# Patient Record
Sex: MALE | Race: WHITE | NOT HISPANIC OR LATINO | Employment: OTHER | URBAN - METROPOLITAN AREA
[De-identification: names, ages, dates, MRNs, and addresses within clinical notes are randomized per-mention and may not be internally consistent; named-entity substitution may affect disease eponyms.]

---

## 2017-08-31 ENCOUNTER — HISTORICAL (OUTPATIENT)
Dept: ADMINISTRATIVE | Facility: HOSPITAL | Age: 72
End: 2017-08-31

## 2017-08-31 LAB
ALBUMIN SERPL-MCNC: 4.5 GM/DL (ref 3.4–5)
ALBUMIN/GLOB SERPL: 1 RATIO (ref 1–2)
ALP SERPL-CCNC: 72 UNIT/L (ref 45–117)
ALT SERPL-CCNC: 22 UNIT/L (ref 12–78)
AST SERPL-CCNC: 16 UNIT/L (ref 15–37)
BILIRUB SERPL-MCNC: 0.5 MG/DL (ref 0.2–1)
BILIRUBIN DIRECT+TOT PNL SERPL-MCNC: 0.1 MG/DL
BILIRUBIN DIRECT+TOT PNL SERPL-MCNC: 0.4 MG/DL
BUN SERPL-MCNC: 22 MG/DL (ref 7–18)
CALCIUM SERPL-MCNC: 9.4 MG/DL (ref 8.5–10.1)
CHLORIDE SERPL-SCNC: 102 MMOL/L (ref 98–107)
CO2 SERPL-SCNC: 27 MMOL/L (ref 21–32)
CREAT SERPL-MCNC: 1.1 MG/DL (ref 0.6–1.3)
CREAT UR-MCNC: 190 MG/DL
GLOBULIN SER-MCNC: 3.4 GM/ML (ref 2.3–3.5)
GLUCOSE SERPL-MCNC: 146 MG/DL (ref 74–106)
MICROALBUMIN UR-MCNC: 15.6 MG/L (ref 0–19)
MICROALBUMIN/CREAT RATIO PNL UR: 8.2 MCG/MG CR (ref 0–29)
POTASSIUM SERPL-SCNC: 4.5 MMOL/L (ref 3.5–5.1)
PROT SERPL-MCNC: 7.9 GM/DL (ref 6.4–8.2)
SODIUM SERPL-SCNC: 138 MMOL/L (ref 136–145)

## 2018-05-01 ENCOUNTER — HISTORICAL (OUTPATIENT)
Dept: LAB | Facility: HOSPITAL | Age: 73
End: 2018-05-01

## 2018-05-01 LAB
EST. AVERAGE GLUCOSE BLD GHB EST-MCNC: 157 MG/DL
HBA1C MFR BLD: 7.1 % (ref 4.2–6.3)

## 2019-08-06 ENCOUNTER — HISTORICAL (OUTPATIENT)
Dept: LAB | Facility: HOSPITAL | Age: 74
End: 2019-08-06

## 2019-08-06 LAB
APPEARANCE, UA: CLEAR
BACTERIA SPEC CULT: ABNORMAL /HPF
BILIRUB UR QL STRIP: NEGATIVE
COLOR UR: YELLOW
CREAT UR-MCNC: 76 MG/DL
GLUCOSE (UA): ABNORMAL
HGB UR QL STRIP: NEGATIVE
KETONES UR QL STRIP: NEGATIVE
LEUKOCYTE ESTERASE UR QL STRIP: NEGATIVE
MICROALBUMIN UR-MCNC: 1.2 MG/DL
MICROALBUMIN/CREAT RATIO PNL UR: 15.8 MG/GM CR (ref 0–30)
NITRITE UR QL STRIP: NEGATIVE
PH UR STRIP: 6 [PH] (ref 5–9)
PROT UR QL STRIP: NEGATIVE
RBC #/AREA URNS HPF: ABNORMAL /[HPF]
SP GR UR STRIP: 1.02 (ref 1–1.03)
SQUAMOUS EPITHELIAL, UA: ABNORMAL
UROBILINOGEN UR STRIP-ACNC: 1
WBC #/AREA URNS HPF: ABNORMAL /HPF

## 2020-01-30 ENCOUNTER — HISTORICAL (OUTPATIENT)
Dept: ADMINISTRATIVE | Facility: HOSPITAL | Age: 75
End: 2020-01-30

## 2020-01-30 LAB
ABS NEUT (OLG): 0.42 X10(3)/MCL (ref 2.1–9.2)
ALBUMIN SERPL-MCNC: 4.1 GM/DL (ref 3.4–5)
ALBUMIN/GLOB SERPL: 1.2 RATIO (ref 1.1–2)
ALP SERPL-CCNC: 68 UNIT/L (ref 50–136)
ALT SERPL-CCNC: 16 UNIT/L (ref 12–78)
ANISOCYTOSIS BLD QL SMEAR: 1
APPEARANCE, UA: CLEAR
AST SERPL-CCNC: 8 UNIT/L (ref 15–37)
BACTERIA SPEC CULT: ABNORMAL /HPF
BILIRUB SERPL-MCNC: 0.5 MG/DL (ref 0.2–1)
BILIRUB UR QL STRIP: NEGATIVE
BILIRUBIN DIRECT+TOT PNL SERPL-MCNC: 0.2 MG/DL (ref 0–0.5)
BILIRUBIN DIRECT+TOT PNL SERPL-MCNC: 0.3 MG/DL (ref 0–0.8)
BUN SERPL-MCNC: 22 MG/DL (ref 7–18)
CALCIUM SERPL-MCNC: 9 MG/DL (ref 8.5–10.1)
CHLORIDE SERPL-SCNC: 104 MMOL/L (ref 98–107)
CHOLEST SERPL-MCNC: 154 MG/DL (ref 0–200)
CHOLEST/HDLC SERPL: 4.3 {RATIO} (ref 0–5)
CO2 SERPL-SCNC: 28 MMOL/L (ref 21–32)
COLOR UR: YELLOW
CREAT SERPL-MCNC: 1.11 MG/DL (ref 0.7–1.3)
CREAT UR-MCNC: 95 MG/DL
ERYTHROCYTE [DISTWIDTH] IN BLOOD BY AUTOMATED COUNT: 15 % (ref 11.5–17)
EST. AVERAGE GLUCOSE BLD GHB EST-MCNC: 143 MG/DL
GLOBULIN SER-MCNC: 3.4 GM/DL (ref 2.4–3.5)
GLUCOSE (UA): ABNORMAL
GLUCOSE SERPL-MCNC: 152 MG/DL (ref 74–106)
HBA1C MFR BLD: 6.6 % (ref 4.2–6.3)
HCT VFR BLD AUTO: 38.1 % (ref 42–52)
HDLC SERPL-MCNC: 36 MG/DL (ref 35–60)
HGB BLD-MCNC: 12.1 GM/DL (ref 14–18)
HGB UR QL STRIP: NEGATIVE
KETONES UR QL STRIP: NEGATIVE
LDLC SERPL CALC-MCNC: 74 MG/DL (ref 0–129)
LEUKOCYTE ESTERASE UR QL STRIP: NEGATIVE
LYMPHOCYTES NFR BLD MANUAL: 70 % (ref 13–40)
MACROCYTES BLD QL SMEAR: 1 /MCL
MCH RBC QN AUTO: 33.4 PG (ref 27–31)
MCHC RBC AUTO-ENTMCNC: 31.8 GM/DL (ref 33–36)
MCV RBC AUTO: 105.2 FL (ref 80–94)
METAMYELOCYTES NFR BLD MANUAL: 1 %
MICROALBUMIN UR-MCNC: 1.5 MG/DL
MICROALBUMIN/CREAT RATIO PNL UR: 15.8 MG/GM CR (ref 0–30)
MONOCYTES NFR BLD MANUAL: 10 % (ref 2–11)
NEUTROPHILS NFR BLD MANUAL: 19 % (ref 47–80)
NITRITE UR QL STRIP: NEGATIVE
PH UR STRIP: 5.5 [PH] (ref 5–9)
PLATELET # BLD AUTO: 139 X10(3)/MCL (ref 130–400)
PLATELET # BLD EST: NORMAL 10*3/UL
PMV BLD AUTO: 9.8 FL (ref 7.4–10.4)
POTASSIUM SERPL-SCNC: 4.2 MMOL/L (ref 3.5–5.1)
PROT SERPL-MCNC: 7.5 GM/DL (ref 6.4–8.2)
PROT UR QL STRIP: NEGATIVE
RBC # BLD AUTO: 3.62 X10(6)/MCL (ref 4.7–6.1)
RBC #/AREA URNS HPF: ABNORMAL /[HPF]
RBC MORPH BLD: ABNORMAL
SODIUM SERPL-SCNC: 139 MMOL/L (ref 136–145)
SP GR UR STRIP: 1.03 (ref 1–1.03)
SQUAMOUS EPITHELIAL, UA: ABNORMAL
TRIGL SERPL-MCNC: 218 MG/DL (ref 30–150)
UROBILINOGEN UR STRIP-ACNC: 1
VLDLC SERPL CALC-MCNC: 44 MG/DL
WBC # SPEC AUTO: 2.2 X10(3)/MCL (ref 4.5–11.5)
WBC #/AREA URNS HPF: ABNORMAL /HPF

## 2020-07-08 ENCOUNTER — HISTORICAL (OUTPATIENT)
Dept: ADMINISTRATIVE | Facility: HOSPITAL | Age: 75
End: 2020-07-08

## 2020-07-08 LAB
ABS NEUT (OLG): 0.57 X10(3)/MCL (ref 2.1–9.2)
ERYTHROCYTE [DISTWIDTH] IN BLOOD BY AUTOMATED COUNT: 15.2 % (ref 11.5–17)
HCT VFR BLD AUTO: 38.8 % (ref 42–52)
HGB BLD-MCNC: 12.5 GM/DL (ref 14–18)
LYMPHOCYTES # BLD AUTO: 1.3 X10(3)/MCL (ref 0.6–4.6)
LYMPHOCYTES NFR BLD AUTO: 57 %
MCH RBC QN AUTO: 34 PG (ref 27–31)
MCHC RBC AUTO-ENTMCNC: 32.2 GM/DL (ref 33–36)
MCV RBC AUTO: 105.4 FL (ref 80–94)
MONOCYTES # BLD AUTO: 0.3 X10(3)/MCL (ref 0.1–1.3)
MONOCYTES NFR BLD AUTO: 14 %
NEUTROPHILS # BLD AUTO: 0.57 X10(3)/MCL (ref 2.1–9.2)
NEUTROPHILS NFR BLD AUTO: 26 %
PLATELET # BLD AUTO: 126 X10(3)/MCL (ref 130–400)
PMV BLD AUTO: 10.1 FL (ref 9.4–12.4)
RBC # BLD AUTO: 3.68 X10(6)/MCL (ref 4.7–6.1)
WBC # SPEC AUTO: 2.2 X10(3)/MCL (ref 4.5–11.5)

## 2020-08-19 ENCOUNTER — HISTORICAL (OUTPATIENT)
Dept: ADMINISTRATIVE | Facility: HOSPITAL | Age: 75
End: 2020-08-19

## 2020-08-19 LAB
ABS NEUT (OLG): 0.62 X10(3)/MCL (ref 2.1–9.2)
ERYTHROCYTE [DISTWIDTH] IN BLOOD BY AUTOMATED COUNT: 15.7 % (ref 11.5–14.5)
FERRITIN SERPL-MCNC: 22.9 NG/ML (ref 26–388)
FOLATE SERPL-MCNC: 10.5 NG/ML (ref 3.1–17.5)
HAPTOGLOB SERPL-MCNC: 173 MG/DL (ref 40–368)
HAV IGM SERPL QL IA: NONREACTIVE
HBV CORE IGM SERPL QL IA: NONREACTIVE
HBV SURFACE AG SERPL QL IA: NONREACTIVE
HCT VFR BLD AUTO: 38.4 % (ref 40–51)
HCV AB SERPL QL IA: NONREACTIVE
HGB BLD-MCNC: 12 GM/DL (ref 13.5–17.5)
HIV 1+2 AB+HIV1 P24 AG SERPL QL IA: NONREACTIVE
IMM GRANULOCYTES # BLD AUTO: 0.03 10*3/UL
IMM GRANULOCYTES NFR BLD AUTO: 1 %
IRON SATN MFR SERPL: 25.3 % (ref 15–50)
IRON SERPL-MCNC: 97 MCG/DL (ref 65–175)
LDH SERPL-CCNC: 187 UNIT/L (ref 87–241)
LYMPHOCYTES # BLD AUTO: 1.4 X10(3)/MCL (ref 0.6–4.6)
LYMPHOCYTES NFR BLD AUTO: 58 %
MCH RBC QN AUTO: 34 PG (ref 26–34)
MCHC RBC AUTO-ENTMCNC: 31.3 GM/DL (ref 31–37)
MCV RBC AUTO: 108.8 FL (ref 80–100)
MONOCYTES # BLD AUTO: 0.4 X10(3)/MCL (ref 0.1–1.3)
MONOCYTES NFR BLD AUTO: 14 %
NEUTROPHILS # BLD AUTO: 0.62 X10(3)/MCL (ref 2.1–9.2)
NEUTROPHILS NFR BLD AUTO: 26 %
PLATELET # BLD AUTO: 117 X10(3)/MCL (ref 130–400)
PMV BLD AUTO: 10.6 FL (ref 7.4–10.4)
RBC # BLD AUTO: 3.53 X10(6)/MCL (ref 4.5–5.9)
RET# (OHS): 0.08 X10(6)/MCL (ref 0.02–0.09)
RETICULOCYTE COUNT AUTOMATED (OLG): 2.1 % (ref 0.5–1.5)
TIBC SERPL-MCNC: 384 MCG/DL (ref 250–450)
TRANSFERRIN SERPL-MCNC: 297 MG/DL (ref 200–360)
VIT B12 SERPL-MCNC: 190 PG/ML (ref 193–986)
WBC # SPEC AUTO: 2.4 X10(3)/MCL (ref 4.5–11)

## 2020-09-08 ENCOUNTER — HISTORICAL (OUTPATIENT)
Dept: ENDOSCOPY | Facility: HOSPITAL | Age: 75
End: 2020-09-08

## 2020-09-08 LAB
ABS NEUT (OLG): 0.76 X10(3)/MCL (ref 2.1–9.2)
BASOPHILS # BLD AUTO: 0 X10(3)/MCL (ref 0–0.2)
BASOPHILS NFR BLD AUTO: 0 %
ERYTHROCYTE [DISTWIDTH] IN BLOOD BY AUTOMATED COUNT: 15 % (ref 11.5–14.5)
HCT VFR BLD AUTO: 38.7 % (ref 40–51)
HGB BLD-MCNC: 12.4 GM/DL (ref 13.5–17.5)
IMM GRANULOCYTES # BLD AUTO: 0.12 10*3/UL
IMM GRANULOCYTES NFR BLD AUTO: 5 %
INR PPP: 1.02 (ref 0.9–1.2)
LYMPHOCYTES # BLD AUTO: 1.3 X10(3)/MCL (ref 0.6–4.6)
LYMPHOCYTES NFR BLD AUTO: 54 %
MCH RBC QN AUTO: 33.3 PG (ref 26–34)
MCHC RBC AUTO-ENTMCNC: 32 GM/DL (ref 31–37)
MCV RBC AUTO: 104 FL (ref 80–100)
MONOCYTES # BLD AUTO: 0.2 X10(3)/MCL (ref 0.1–1.3)
MONOCYTES NFR BLD AUTO: 9 %
NEUTROPHILS # BLD AUTO: 0.76 X10(3)/MCL (ref 2.1–9.2)
NEUTROPHILS NFR BLD AUTO: 31 %
PLATELET # BLD AUTO: 131 X10(3)/MCL (ref 130–400)
PMV BLD AUTO: 9.6 FL (ref 7.4–10.4)
PROTHROMBIN TIME: 13 SECOND(S) (ref 11.9–14.4)
RBC # BLD AUTO: 3.72 X10(6)/MCL (ref 4.5–5.9)
WBC # SPEC AUTO: 2.4 X10(3)/MCL (ref 4.5–11)

## 2020-11-12 ENCOUNTER — HISTORICAL (OUTPATIENT)
Dept: HEMATOLOGY/ONCOLOGY | Facility: CLINIC | Age: 75
End: 2020-11-12

## 2020-11-12 LAB
ABS NEUT (OLG): 1.61 X10(3)/MCL (ref 2.1–9.2)
ALBUMIN SERPL-MCNC: 4.3 GM/DL (ref 3.4–4.8)
ALBUMIN/GLOB SERPL: 1.3 RATIO (ref 1.1–2)
ALP SERPL-CCNC: 87 UNIT/L (ref 40–150)
ALT SERPL-CCNC: 10 UNIT/L (ref 0–55)
AST SERPL-CCNC: 13 UNIT/L (ref 5–34)
BASOPHILS # BLD AUTO: 0 X10(3)/MCL (ref 0–0.2)
BASOPHILS NFR BLD AUTO: 0 %
BILIRUB SERPL-MCNC: 0.7 MG/DL
BILIRUBIN DIRECT+TOT PNL SERPL-MCNC: 0.3 MG/DL (ref 0–0.5)
BILIRUBIN DIRECT+TOT PNL SERPL-MCNC: 0.4 MG/DL (ref 0–0.8)
BUN SERPL-MCNC: 18 MG/DL (ref 8.4–25.7)
CALCIUM SERPL-MCNC: 9.4 MG/DL (ref 8.8–10)
CHLORIDE SERPL-SCNC: 101 MMOL/L (ref 98–107)
CO2 SERPL-SCNC: 26 MMOL/L (ref 23–31)
CREAT SERPL-MCNC: 1.12 MG/DL (ref 0.73–1.18)
ERYTHROCYTE [DISTWIDTH] IN BLOOD BY AUTOMATED COUNT: 15.6 % (ref 11.5–14.5)
FERRITIN SERPL-MCNC: 60.94 NG/ML (ref 21.81–274.66)
GLOBULIN SER-MCNC: 3.4 GM/DL (ref 2.4–3.5)
GLUCOSE SERPL-MCNC: 132 MG/DL (ref 82–115)
HCT VFR BLD AUTO: 37.6 % (ref 40–51)
HGB BLD-MCNC: 12.4 GM/DL (ref 13.5–17.5)
IMM GRANULOCYTES # BLD AUTO: 0.14 10*3/UL
IMM GRANULOCYTES NFR BLD AUTO: 4 %
IRON SATN MFR SERPL: 20 % (ref 20–50)
IRON SERPL-MCNC: 68 UG/DL (ref 65–175)
LYMPHOCYTES # BLD AUTO: 1.2 X10(3)/MCL (ref 0.6–4.6)
LYMPHOCYTES NFR BLD AUTO: 36 %
MCH RBC QN AUTO: 34.3 PG (ref 26–34)
MCHC RBC AUTO-ENTMCNC: 33 GM/DL (ref 31–37)
MCV RBC AUTO: 103.9 FL (ref 80–100)
MONOCYTES # BLD AUTO: 0.4 X10(3)/MCL (ref 0.1–1.3)
MONOCYTES NFR BLD AUTO: 12 %
NEUTROPHILS # BLD AUTO: 1.61 X10(3)/MCL (ref 2.1–9.2)
NEUTROPHILS NFR BLD AUTO: 47 %
PLATELET # BLD AUTO: 122 X10(3)/MCL (ref 130–400)
PMV BLD AUTO: 9.9 FL (ref 7.4–10.4)
POTASSIUM SERPL-SCNC: 4.2 MMOL/L (ref 3.5–5.1)
PROT SERPL-MCNC: 7.7 GM/DL (ref 5.8–7.6)
RBC # BLD AUTO: 3.62 X10(6)/MCL (ref 4.5–5.9)
SODIUM SERPL-SCNC: 138 MMOL/L (ref 136–145)
TIBC SERPL-MCNC: 264 UG/DL (ref 69–240)
TIBC SERPL-MCNC: 332 UG/DL (ref 250–450)
TRANSFERRIN SERPL-MCNC: 302 MG/DL (ref 163–344)
VIT B12 SERPL-MCNC: 933 PG/ML (ref 213–816)
WBC # SPEC AUTO: 3.4 X10(3)/MCL (ref 4.5–11)

## 2020-12-14 ENCOUNTER — HISTORICAL (OUTPATIENT)
Dept: HEMATOLOGY/ONCOLOGY | Facility: CLINIC | Age: 75
End: 2020-12-14

## 2020-12-14 LAB
ABS NEUT (OLG): 0.69 X10(3)/MCL (ref 2.1–9.2)
ALBUMIN SERPL-MCNC: 4.3 GM/DL (ref 3.4–4.8)
ALBUMIN/GLOB SERPL: 1.1 RATIO (ref 1.1–2)
ALP SERPL-CCNC: 61 UNIT/L (ref 40–150)
ALT SERPL-CCNC: 10 UNIT/L (ref 0–55)
AST SERPL-CCNC: 13 UNIT/L (ref 5–34)
BILIRUB SERPL-MCNC: 0.6 MG/DL
BILIRUBIN DIRECT+TOT PNL SERPL-MCNC: 0.3 MG/DL (ref 0–0.5)
BILIRUBIN DIRECT+TOT PNL SERPL-MCNC: 0.3 MG/DL (ref 0–0.8)
BUN SERPL-MCNC: 24 MG/DL (ref 8.4–25.7)
CALCIUM SERPL-MCNC: 9.6 MG/DL (ref 8.8–10)
CHLORIDE SERPL-SCNC: 99 MMOL/L (ref 98–107)
CO2 SERPL-SCNC: 25 MMOL/L (ref 23–31)
CREAT SERPL-MCNC: 1.08 MG/DL (ref 0.73–1.18)
ERYTHROCYTE [DISTWIDTH] IN BLOOD BY AUTOMATED COUNT: 15.3 % (ref 11.5–14.5)
FERRITIN SERPL-MCNC: 67.15 NG/ML (ref 21.81–274.66)
GLOBULIN SER-MCNC: 3.8 GM/DL (ref 2.4–3.5)
GLUCOSE SERPL-MCNC: 137 MG/DL (ref 82–115)
HCT VFR BLD AUTO: 39.2 % (ref 40–51)
HGB BLD-MCNC: 12.6 GM/DL (ref 13.5–17.5)
IMM GRANULOCYTES # BLD AUTO: 0.11 10*3/UL
IMM GRANULOCYTES NFR BLD AUTO: 4 %
IRON SATN MFR SERPL: 28 % (ref 20–50)
IRON SERPL-MCNC: 88 UG/DL (ref 65–175)
LYMPHOCYTES # BLD AUTO: 1.5 X10(3)/MCL (ref 0.6–4.6)
LYMPHOCYTES NFR BLD AUTO: 57 %
MCH RBC QN AUTO: 34 PG (ref 26–34)
MCHC RBC AUTO-ENTMCNC: 32.1 GM/DL (ref 31–37)
MCV RBC AUTO: 105.7 FL (ref 80–100)
MONOCYTES # BLD AUTO: 0.3 X10(3)/MCL (ref 0.1–1.3)
MONOCYTES NFR BLD AUTO: 13 %
NEUTROPHILS # BLD AUTO: 0.69 X10(3)/MCL (ref 2.1–9.2)
NEUTROPHILS NFR BLD AUTO: 26 %
PLATELET # BLD AUTO: 144 X10(3)/MCL (ref 130–400)
PMV BLD AUTO: 10 FL (ref 7.4–10.4)
POTASSIUM SERPL-SCNC: 4.1 MMOL/L (ref 3.5–5.1)
PROT SERPL-MCNC: 8.1 GM/DL (ref 5.8–7.6)
RBC # BLD AUTO: 3.71 X10(6)/MCL (ref 4.5–5.9)
SODIUM SERPL-SCNC: 137 MMOL/L (ref 136–145)
TIBC SERPL-MCNC: 231 UG/DL (ref 69–240)
TIBC SERPL-MCNC: 319 UG/DL (ref 250–450)
TRANSFERRIN SERPL-MCNC: 299 MG/DL (ref 163–344)
VIT B12 SERPL-MCNC: 433 PG/ML (ref 213–816)
WBC # SPEC AUTO: 2.7 X10(3)/MCL (ref 4.5–11)

## 2021-02-02 ENCOUNTER — HISTORICAL (OUTPATIENT)
Dept: ADMINISTRATIVE | Facility: HOSPITAL | Age: 76
End: 2021-02-02

## 2021-02-02 LAB
ABS NEUT (OLG): 0.54 X10(3)/MCL (ref 2.1–9.2)
ALBUMIN SERPL-MCNC: 4.4 GM/DL (ref 3.4–4.8)
ALBUMIN/GLOB SERPL: 1.4 RATIO (ref 1.1–2)
ALP SERPL-CCNC: 65 UNIT/L (ref 40–150)
ALT SERPL-CCNC: 13 UNIT/L (ref 0–55)
APPEARANCE, UA: CLEAR
AST SERPL-CCNC: 12 UNIT/L (ref 5–34)
BACTERIA SPEC CULT: ABNORMAL /HPF
BASOPHILS NFR BLD MANUAL: 1 % (ref 0–2)
BILIRUB SERPL-MCNC: 0.7 MG/DL
BILIRUB UR QL STRIP: NEGATIVE
BILIRUBIN DIRECT+TOT PNL SERPL-MCNC: 0.3 MG/DL (ref 0–0.5)
BILIRUBIN DIRECT+TOT PNL SERPL-MCNC: 0.4 MG/DL (ref 0–0.8)
BUN SERPL-MCNC: 18.7 MG/DL (ref 8.4–25.7)
CALCIUM SERPL-MCNC: 9.5 MG/DL (ref 8.8–10)
CHLORIDE SERPL-SCNC: 101 MMOL/L (ref 98–107)
CHOLEST SERPL-MCNC: 165 MG/DL
CHOLEST/HDLC SERPL: 4 {RATIO} (ref 0–5)
CO2 SERPL-SCNC: 29 MMOL/L (ref 23–31)
COLOR UR: YELLOW
CREAT SERPL-MCNC: 1.05 MG/DL (ref 0.73–1.18)
CREAT UR-MCNC: 105.2 MG/DL (ref 58–161)
EOSINOPHIL NFR BLD MANUAL: 0 % (ref 0–8)
ERYTHROCYTE [DISTWIDTH] IN BLOOD BY AUTOMATED COUNT: 15.4 % (ref 11.5–17)
EST. AVERAGE GLUCOSE BLD GHB EST-MCNC: 165.7 MG/DL
GLOBULIN SER-MCNC: 3.2 GM/DL (ref 2.4–3.5)
GLUCOSE (UA): ABNORMAL
GLUCOSE SERPL-MCNC: 165 MG/DL (ref 82–115)
HBA1C MFR BLD: 7.4 %
HCT VFR BLD AUTO: 39.8 % (ref 42–52)
HDLC SERPL-MCNC: 39 MG/DL (ref 35–60)
HGB BLD-MCNC: 12.8 GM/DL (ref 14–18)
HGB UR QL STRIP: NEGATIVE
KETONES UR QL STRIP: NEGATIVE
LDLC SERPL CALC-MCNC: 77 MG/DL (ref 50–140)
LEUKOCYTE ESTERASE UR QL STRIP: NEGATIVE
LYMPHOCYTES NFR BLD MANUAL: 51 % (ref 13–40)
MCH RBC QN AUTO: 34 PG (ref 27–31)
MCHC RBC AUTO-ENTMCNC: 32.2 GM/DL (ref 33–36)
MCV RBC AUTO: 105.6 FL (ref 80–94)
MICROALBUMIN UR-MCNC: 23.1 UG/ML
MICROALBUMIN/CREAT RATIO PNL UR: 22 MG/GM CR (ref 0–30)
MONOCYTES NFR BLD MANUAL: 13 % (ref 2–11)
NEUTROPHILS NFR BLD MANUAL: 35 % (ref 47–80)
NITRITE UR QL STRIP: NEGATIVE
PH UR STRIP: 6.5 [PH] (ref 5–9)
PLATELET # BLD AUTO: 131 X10(3)/MCL (ref 130–400)
PLATELET # BLD EST: ABNORMAL 10*3/UL
PMV BLD AUTO: 10.2 FL (ref 9.4–12.4)
POTASSIUM SERPL-SCNC: 4.6 MMOL/L (ref 3.5–5.1)
PROT SERPL-MCNC: 7.6 GM/DL (ref 5.8–7.6)
PROT UR QL STRIP: NEGATIVE
RBC # BLD AUTO: 3.77 X10(6)/MCL (ref 4.7–6.1)
RBC #/AREA URNS HPF: ABNORMAL /[HPF]
RBC MORPH BLD: NORMAL
SODIUM SERPL-SCNC: 138 MMOL/L (ref 136–145)
SP GR UR STRIP: 1.03 (ref 1–1.03)
SQUAMOUS EPITHELIAL, UA: ABNORMAL
TRIGL SERPL-MCNC: 244 MG/DL (ref 34–140)
UROBILINOGEN UR STRIP-ACNC: 1
VLDLC SERPL CALC-MCNC: 49 MG/DL
WBC # SPEC AUTO: 1.8 X10(3)/MCL (ref 4.5–11.5)
WBC #/AREA URNS HPF: ABNORMAL /HPF

## 2021-02-22 ENCOUNTER — HISTORICAL (OUTPATIENT)
Dept: HEMATOLOGY/ONCOLOGY | Facility: CLINIC | Age: 76
End: 2021-02-22

## 2021-02-22 LAB
ABS NEUT (OLG): 0.66 X10(3)/MCL (ref 2.1–9.2)
ALBUMIN SERPL-MCNC: 4.2 GM/DL (ref 3.4–4.8)
ALBUMIN/GLOB SERPL: 1.2 RATIO (ref 1.1–2)
ALP SERPL-CCNC: 70 UNIT/L (ref 40–150)
ALT SERPL-CCNC: 13 UNIT/L (ref 0–55)
AST SERPL-CCNC: 15 UNIT/L (ref 5–34)
BILIRUB SERPL-MCNC: 0.7 MG/DL
BILIRUBIN DIRECT+TOT PNL SERPL-MCNC: 0.3 MG/DL (ref 0–0.5)
BILIRUBIN DIRECT+TOT PNL SERPL-MCNC: 0.4 MG/DL (ref 0–0.8)
BUN SERPL-MCNC: 21.5 MG/DL (ref 8.4–25.7)
CALCIUM SERPL-MCNC: 9.2 MG/DL (ref 8.8–10)
CHLORIDE SERPL-SCNC: 101 MMOL/L (ref 98–107)
CO2 SERPL-SCNC: 26 MMOL/L (ref 23–31)
CREAT SERPL-MCNC: 1.24 MG/DL (ref 0.73–1.18)
ERYTHROCYTE [DISTWIDTH] IN BLOOD BY AUTOMATED COUNT: 15.6 % (ref 11.5–14.5)
GLOBULIN SER-MCNC: 3.4 GM/DL (ref 2.4–3.5)
GLUCOSE SERPL-MCNC: 157 MG/DL (ref 82–115)
HCT VFR BLD AUTO: 38.2 % (ref 40–51)
HGB BLD-MCNC: 12.1 GM/DL (ref 13.5–17.5)
IMM GRANULOCYTES # BLD AUTO: 0.1 10*3/UL
IMM GRANULOCYTES NFR BLD AUTO: 4 %
LYMPHOCYTES # BLD AUTO: 1.5 X10(3)/MCL (ref 0.6–4.6)
LYMPHOCYTES NFR BLD AUTO: 57 %
MCH RBC QN AUTO: 34 PG (ref 26–34)
MCHC RBC AUTO-ENTMCNC: 31.7 GM/DL (ref 31–37)
MCV RBC AUTO: 107.3 FL (ref 80–100)
MONOCYTES # BLD AUTO: 0.4 X10(3)/MCL (ref 0.1–1.3)
MONOCYTES NFR BLD AUTO: 15 %
NEUTROPHILS # BLD AUTO: 0.66 X10(3)/MCL (ref 2.1–9.2)
NEUTROPHILS NFR BLD AUTO: 25 %
PLATELET # BLD AUTO: 134 X10(3)/MCL (ref 130–400)
PMV BLD AUTO: 10.3 FL (ref 7.4–10.4)
POTASSIUM SERPL-SCNC: 4.3 MMOL/L (ref 3.5–5.1)
PROT SERPL-MCNC: 7.6 GM/DL (ref 5.8–7.6)
RBC # BLD AUTO: 3.56 X10(6)/MCL (ref 4.5–5.9)
SODIUM SERPL-SCNC: 138 MMOL/L (ref 136–145)
VIT B12 SERPL-MCNC: 928 PG/ML (ref 213–816)
WBC # SPEC AUTO: 2.7 X10(3)/MCL (ref 4.5–11)

## 2021-03-08 ENCOUNTER — HISTORICAL (OUTPATIENT)
Dept: ADMINISTRATIVE | Facility: HOSPITAL | Age: 76
End: 2021-03-08

## 2021-03-08 LAB
ABS NEUT (OLG): 0.38 X10(3)/MCL (ref 2.1–9.2)
ALBUMIN SERPL-MCNC: 4.6 GM/DL (ref 3.4–4.8)
ALBUMIN/GLOB SERPL: 1.5 RATIO (ref 1.1–2)
ALP SERPL-CCNC: 69 UNIT/L (ref 40–150)
ALT SERPL-CCNC: 15 UNIT/L (ref 0–55)
AST SERPL-CCNC: 17 UNIT/L (ref 5–34)
BASOPHILS # BLD AUTO: 0 X10(3)/MCL (ref 0–0.2)
BASOPHILS NFR BLD AUTO: 0.5 %
BILIRUB SERPL-MCNC: 0.6 MG/DL
BILIRUBIN DIRECT+TOT PNL SERPL-MCNC: 0.2 MG/DL (ref 0–0.5)
BILIRUBIN DIRECT+TOT PNL SERPL-MCNC: 0.4 MG/DL (ref 0–0.8)
BUN SERPL-MCNC: 20.8 MG/DL (ref 8.4–25.7)
CALCIUM SERPL-MCNC: 9.3 MG/DL (ref 8.8–10)
CHLORIDE SERPL-SCNC: 102 MMOL/L (ref 98–107)
CO2 SERPL-SCNC: 27 MMOL/L (ref 23–31)
CREAT SERPL-MCNC: 1.23 MG/DL (ref 0.73–1.18)
EOSINOPHIL # BLD AUTO: 0 X10(3)/MCL (ref 0–0.9)
EOSINOPHIL NFR BLD AUTO: 0 %
ERYTHROCYTE [DISTWIDTH] IN BLOOD BY AUTOMATED COUNT: 15.4 % (ref 11.5–17)
GLOBULIN SER-MCNC: 3.1 GM/DL (ref 2.4–3.5)
GLUCOSE SERPL-MCNC: 204 MG/DL (ref 82–115)
HCT VFR BLD AUTO: 38.8 % (ref 42–52)
HGB BLD-MCNC: 12.4 GM/DL (ref 14–18)
LDH SERPL-CCNC: 221 UNIT/L (ref 140–271)
LYMPHOCYTES # BLD AUTO: 1.3 X10(3)/MCL (ref 0.6–4.6)
LYMPHOCYTES NFR BLD AUTO: 63.2 %
MCH RBC QN AUTO: 33.9 PG (ref 27–31)
MCHC RBC AUTO-ENTMCNC: 32 GM/DL (ref 33–36)
MCV RBC AUTO: 106 FL (ref 80–94)
MONOCYTES # BLD AUTO: 0.3 X10(3)/MCL (ref 0.1–1.3)
MONOCYTES NFR BLD AUTO: 13.9 %
NEUTROPHILS # BLD AUTO: 0.4 X10(3)/MCL (ref 2.1–9.2)
NEUTROPHILS NFR BLD AUTO: 18.1 %
PLATELET # BLD AUTO: 124 X10(3)/MCL (ref 130–400)
PMV BLD AUTO: 9.5 FL (ref 9.4–12.4)
POTASSIUM SERPL-SCNC: 4.3 MMOL/L (ref 3.5–5.1)
PROT SERPL-MCNC: 7.7 GM/DL (ref 5.8–7.6)
RBC # BLD AUTO: 3.66 X10(6)/MCL (ref 4.7–6.1)
SODIUM SERPL-SCNC: 139 MMOL/L (ref 136–145)
WBC # SPEC AUTO: 2.1 X10(3)/MCL (ref 4.5–11.5)

## 2021-04-06 ENCOUNTER — HISTORICAL (OUTPATIENT)
Dept: ENDOSCOPY | Facility: HOSPITAL | Age: 76
End: 2021-04-06

## 2021-04-06 LAB
ABS NEUT (OLG): 0.59 X10(3)/MCL (ref 2.1–9.2)
BASOPHILS # BLD AUTO: 0 X10(3)/MCL (ref 0–0.2)
BASOPHILS NFR BLD AUTO: 0 %
ERYTHROCYTE [DISTWIDTH] IN BLOOD BY AUTOMATED COUNT: 15.2 % (ref 11.5–14.5)
HCT VFR BLD AUTO: 40.6 % (ref 40–51)
HGB BLD-MCNC: 13.2 GM/DL (ref 13.5–17.5)
IMM GRANULOCYTES # BLD AUTO: 0.07 10*3/UL
IMM GRANULOCYTES NFR BLD AUTO: 3 %
INR PPP: 0.95 (ref 0.9–1.2)
LYMPHOCYTES # BLD AUTO: 1.2 X10(3)/MCL (ref 0.6–4.6)
LYMPHOCYTES NFR BLD AUTO: 56 %
MCH RBC QN AUTO: 34.2 PG (ref 26–34)
MCHC RBC AUTO-ENTMCNC: 32.5 GM/DL (ref 31–37)
MCV RBC AUTO: 105.2 FL (ref 80–100)
MONOCYTES # BLD AUTO: 0.3 X10(3)/MCL (ref 0.1–1.3)
MONOCYTES NFR BLD AUTO: 15 %
NEUTROPHILS # BLD AUTO: 0.59 X10(3)/MCL (ref 2.1–9.2)
NEUTROPHILS NFR BLD AUTO: 26 %
PLATELET # BLD AUTO: 145 X10(3)/MCL (ref 130–400)
PMV BLD AUTO: 9.7 FL (ref 7.4–10.4)
PROTHROMBIN TIME: 12.2 SECOND(S) (ref 11.9–14.4)
RBC # BLD AUTO: 3.86 X10(6)/MCL (ref 4.5–5.9)
WBC # SPEC AUTO: 2.2 X10(3)/MCL (ref 4.5–11)

## 2021-04-22 ENCOUNTER — HISTORICAL (OUTPATIENT)
Dept: HEMATOLOGY/ONCOLOGY | Facility: CLINIC | Age: 76
End: 2021-04-22

## 2021-04-22 LAB
ABS NEUT (OLG): 1.51 X10(3)/MCL (ref 2.1–9.2)
BASOPHILS # BLD AUTO: 0 X10(3)/MCL (ref 0–0.2)
BASOPHILS NFR BLD AUTO: 0.7 %
EOSINOPHIL # BLD AUTO: 0 X10(3)/MCL (ref 0–0.9)
EOSINOPHIL NFR BLD AUTO: 0 %
ERYTHROCYTE [DISTWIDTH] IN BLOOD BY AUTOMATED COUNT: 15.1 % (ref 11.5–17)
HCT VFR BLD AUTO: 40.7 % (ref 42–52)
HGB BLD-MCNC: 12.7 GM/DL (ref 14–18)
LYMPHOCYTES # BLD AUTO: 1.6 X10(3)/MCL (ref 0.6–4.6)
LYMPHOCYTES NFR BLD AUTO: 35.9 %
MCH RBC QN AUTO: 34 PG (ref 27–31)
MCHC RBC AUTO-ENTMCNC: 31.2 GM/DL (ref 33–36)
MCV RBC AUTO: 108.8 FL (ref 80–94)
MONOCYTES # BLD AUTO: 0.4 X10(3)/MCL (ref 0.1–1.3)
MONOCYTES NFR BLD AUTO: 9.4 %
NEUTROPHILS # BLD AUTO: 1.5 X10(3)/MCL (ref 2.1–9.2)
NEUTROPHILS NFR BLD AUTO: 34.9 %
PLATELET # BLD AUTO: 128 X10(3)/MCL (ref 130–400)
PMV BLD AUTO: 9.3 FL (ref 9.4–12.4)
RBC # BLD AUTO: 3.74 X10(6)/MCL (ref 4.7–6.1)
WBC # SPEC AUTO: 4.3 X10(3)/MCL (ref 4.5–11.5)

## 2021-05-03 ENCOUNTER — HISTORICAL (OUTPATIENT)
Dept: ADMINISTRATIVE | Facility: HOSPITAL | Age: 76
End: 2021-05-03

## 2021-05-03 LAB
ABS NEUT (OLG): 0.66 X10(3)/MCL (ref 2.1–9.2)
BASOPHILS # BLD AUTO: 0 X10(3)/MCL (ref 0–0.2)
BASOPHILS NFR BLD AUTO: 0 %
BUN SERPL-MCNC: 17.9 MG/DL (ref 8.4–25.7)
CALCIUM SERPL-MCNC: 9.2 MG/DL (ref 8.8–10)
CHLORIDE SERPL-SCNC: 102 MMOL/L (ref 98–107)
CO2 SERPL-SCNC: 25 MMOL/L (ref 23–31)
CREAT SERPL-MCNC: 0.94 MG/DL (ref 0.73–1.18)
CREAT/UREA NIT SERPL: 19
ERYTHROCYTE [DISTWIDTH] IN BLOOD BY AUTOMATED COUNT: 15.9 % (ref 11.5–17)
EST. AVERAGE GLUCOSE BLD GHB EST-MCNC: 139.8 MG/DL
GLUCOSE SERPL-MCNC: 138 MG/DL (ref 82–115)
HBA1C MFR BLD: 6.5 %
HCT VFR BLD AUTO: 38.7 % (ref 42–52)
HGB BLD-MCNC: 12.1 GM/DL (ref 14–18)
LYMPHOCYTES # BLD AUTO: 1.1 X10(3)/MCL (ref 0.6–4.6)
LYMPHOCYTES NFR BLD AUTO: 52 %
MCH RBC QN AUTO: 34.4 PG (ref 27–31)
MCHC RBC AUTO-ENTMCNC: 31.3 GM/DL (ref 33–36)
MCV RBC AUTO: 109.9 FL (ref 80–94)
MONOCYTES # BLD AUTO: 0.3 X10(3)/MCL (ref 0.1–1.3)
MONOCYTES NFR BLD AUTO: 14 %
NEUTROPHILS # BLD AUTO: 0.66 X10(3)/MCL (ref 2.1–9.2)
NEUTROPHILS NFR BLD AUTO: 32 %
PLATELET # BLD AUTO: 70 X10(3)/MCL (ref 130–400)
PMV BLD AUTO: 11 FL (ref 9.4–12.4)
POTASSIUM SERPL-SCNC: 3.8 MMOL/L (ref 3.5–5.1)
RBC # BLD AUTO: 3.52 X10(6)/MCL (ref 4.7–6.1)
SODIUM SERPL-SCNC: 138 MMOL/L (ref 136–145)
WBC # SPEC AUTO: 2.1 X10(3)/MCL (ref 4.5–11.5)

## 2021-05-05 ENCOUNTER — HISTORICAL (OUTPATIENT)
Dept: ADMINISTRATIVE | Facility: HOSPITAL | Age: 76
End: 2021-05-05

## 2021-05-05 LAB
ABS NEUT (OLG): 0.43 X10(3)/MCL (ref 2.1–9.2)
BASOPHILS # BLD AUTO: 0 X10(3)/MCL (ref 0–0.2)
BASOPHILS NFR BLD AUTO: 0 %
EOSINOPHIL # BLD AUTO: 0 X10(3)/MCL (ref 0–0.9)
EOSINOPHIL NFR BLD AUTO: 0 %
ERYTHROCYTE [DISTWIDTH] IN BLOOD BY AUTOMATED COUNT: 15.5 % (ref 11.5–17)
FOLATE SERPL-MCNC: 8.5 NG/ML (ref 7–31.4)
HCT VFR BLD AUTO: 36.5 % (ref 42–52)
HGB BLD-MCNC: 11.8 GM/DL (ref 14–18)
LYMPHOCYTES # BLD AUTO: 0.6 X10(3)/MCL (ref 0.6–4.6)
LYMPHOCYTES NFR BLD AUTO: 42 %
LYMPHOCYTES NFR BLD MANUAL: 48 % (ref 13–40)
MACROCYTES BLD QL SMEAR: ABNORMAL
MCH RBC QN AUTO: 34.4 PG (ref 27–31)
MCHC RBC AUTO-ENTMCNC: 32.3 GM/DL (ref 33–36)
MCV RBC AUTO: 106.4 FL (ref 80–94)
METAMYELOCYTES NFR BLD MANUAL: 1 %
MONOCYTES # BLD AUTO: 0.3 X10(3)/MCL (ref 0.1–1.3)
MONOCYTES NFR BLD AUTO: 23.7 %
MONOCYTES NFR BLD MANUAL: 22 % (ref 2–11)
NEUTROPHILS # BLD AUTO: 0.4 X10(3)/MCL (ref 2.1–9.2)
NEUTROPHILS NFR BLD AUTO: 32.8 %
NEUTROPHILS NFR BLD MANUAL: 29 % (ref 47–80)
PLATELET # BLD AUTO: 86 X10(3)/MCL (ref 130–400)
PLATELET # BLD EST: ABNORMAL 10*3/UL
PMV BLD AUTO: 9.5 FL (ref 9.4–12.4)
POLYCHROMASIA BLD QL SMEAR: ABNORMAL
RBC # BLD AUTO: 3.43 X10(6)/MCL (ref 4.7–6.1)
RBC MORPH BLD: ABNORMAL
VIT B12 SERPL-MCNC: 1670 PG/ML (ref 213–816)
WBC # SPEC AUTO: 1.3 X10(3)/MCL (ref 4.5–11.5)

## 2021-06-02 ENCOUNTER — HISTORICAL (OUTPATIENT)
Dept: ADMINISTRATIVE | Facility: HOSPITAL | Age: 76
End: 2021-06-02

## 2021-06-02 LAB
ABS NEUT (OLG): 0.66 X10(3)/MCL (ref 2.1–9.2)
ALBUMIN SERPL-MCNC: 4.3 GM/DL (ref 3.4–4.8)
ALBUMIN/GLOB SERPL: 1.4 RATIO (ref 1.1–2)
ALP SERPL-CCNC: 59 UNIT/L (ref 40–150)
ALT SERPL-CCNC: 10 UNIT/L (ref 0–55)
AST SERPL-CCNC: 13 UNIT/L (ref 5–34)
BASOPHILS # BLD AUTO: 0 X10(3)/MCL (ref 0–0.2)
BASOPHILS NFR BLD AUTO: 0.4 %
BILIRUB SERPL-MCNC: 0.5 MG/DL
BILIRUBIN DIRECT+TOT PNL SERPL-MCNC: 0.2 MG/DL (ref 0–0.5)
BILIRUBIN DIRECT+TOT PNL SERPL-MCNC: 0.3 MG/DL (ref 0–0.8)
BUN SERPL-MCNC: 20 MG/DL (ref 8.4–25.7)
CALCIUM SERPL-MCNC: 9.4 MG/DL (ref 8.8–10)
CHLORIDE SERPL-SCNC: 99 MMOL/L (ref 98–107)
CO2 SERPL-SCNC: 25 MMOL/L (ref 23–31)
CREAT SERPL-MCNC: 1.14 MG/DL (ref 0.73–1.18)
EOSINOPHIL # BLD AUTO: 0 X10(3)/MCL (ref 0–0.9)
EOSINOPHIL NFR BLD AUTO: 0 %
ERYTHROCYTE [DISTWIDTH] IN BLOOD BY AUTOMATED COUNT: 15.1 % (ref 11.5–17)
GLOBULIN SER-MCNC: 3 GM/DL (ref 2.4–3.5)
GLUCOSE SERPL-MCNC: 153 MG/DL (ref 82–115)
HCT VFR BLD AUTO: 37.8 % (ref 42–52)
HGB BLD-MCNC: 12.4 GM/DL (ref 14–18)
LYMPHOCYTES # BLD AUTO: 1.6 X10(3)/MCL (ref 0.6–4.6)
LYMPHOCYTES NFR BLD AUTO: 61.6 %
MCH RBC QN AUTO: 34.8 PG (ref 27–31)
MCHC RBC AUTO-ENTMCNC: 32.8 GM/DL (ref 33–36)
MCV RBC AUTO: 106.2 FL (ref 80–94)
MONOCYTES # BLD AUTO: 0.3 X10(3)/MCL (ref 0.1–1.3)
MONOCYTES NFR BLD AUTO: 11.4 %
NEUTROPHILS # BLD AUTO: 0.7 X10(3)/MCL (ref 2.1–9.2)
NEUTROPHILS NFR BLD AUTO: 25.1 %
PLATELET # BLD AUTO: 103 X10(3)/MCL (ref 130–400)
PMV BLD AUTO: 9.4 FL (ref 9.4–12.4)
POTASSIUM SERPL-SCNC: 4.8 MMOL/L (ref 3.5–5.1)
PROT SERPL-MCNC: 7.3 GM/DL (ref 5.8–7.6)
RBC # BLD AUTO: 3.56 X10(6)/MCL (ref 4.7–6.1)
SODIUM SERPL-SCNC: 136 MMOL/L (ref 136–145)
WBC # SPEC AUTO: 2.6 X10(3)/MCL (ref 4.5–11.5)

## 2021-06-14 ENCOUNTER — HISTORICAL (OUTPATIENT)
Dept: ADMINISTRATIVE | Facility: HOSPITAL | Age: 76
End: 2021-06-14

## 2021-06-17 ENCOUNTER — HISTORICAL (OUTPATIENT)
Dept: ADMINISTRATIVE | Facility: HOSPITAL | Age: 76
End: 2021-06-17

## 2021-06-17 LAB
ABS NEUT (OLG): 1.25 X10(3)/MCL (ref 2.1–9.2)
ALBUMIN SERPL-MCNC: 4 GM/DL (ref 3.4–4.8)
ALBUMIN/GLOB SERPL: 1.2 RATIO (ref 1.1–2)
ALP SERPL-CCNC: 74 UNIT/L (ref 40–150)
ALT SERPL-CCNC: 11 UNIT/L (ref 0–55)
AST SERPL-CCNC: 15 UNIT/L (ref 5–34)
BASOPHILS # BLD AUTO: 0 X10(3)/MCL (ref 0–0.2)
BASOPHILS NFR BLD AUTO: 0.3 %
BILIRUB SERPL-MCNC: 0.8 MG/DL
BILIRUBIN DIRECT+TOT PNL SERPL-MCNC: 0.3 MG/DL (ref 0–0.5)
BILIRUBIN DIRECT+TOT PNL SERPL-MCNC: 0.5 MG/DL (ref 0–0.8)
BUN SERPL-MCNC: 24.6 MG/DL (ref 8.4–25.7)
CALCIUM SERPL-MCNC: 9.3 MG/DL (ref 8.8–10)
CHLORIDE SERPL-SCNC: 103 MMOL/L (ref 98–107)
CO2 SERPL-SCNC: 24 MMOL/L (ref 23–31)
CREAT SERPL-MCNC: 1.41 MG/DL (ref 0.73–1.18)
EOSINOPHIL # BLD AUTO: 0 X10(3)/MCL (ref 0–0.9)
EOSINOPHIL NFR BLD AUTO: 0 %
ERYTHROCYTE [DISTWIDTH] IN BLOOD BY AUTOMATED COUNT: 15.2 % (ref 11.5–17)
GLOBULIN SER-MCNC: 3.3 GM/DL (ref 2.4–3.5)
GLUCOSE SERPL-MCNC: 177 MG/DL (ref 82–115)
HCT VFR BLD AUTO: 36.3 % (ref 42–52)
HGB BLD-MCNC: 11.8 GM/DL (ref 14–18)
LYMPHOCYTES # BLD AUTO: 1.1 X10(3)/MCL (ref 0.6–4.6)
LYMPHOCYTES NFR BLD AUTO: 39 %
MCH RBC QN AUTO: 34.3 PG (ref 27–31)
MCHC RBC AUTO-ENTMCNC: 32.5 GM/DL (ref 33–36)
MCV RBC AUTO: 105.5 FL (ref 80–94)
MONOCYTES # BLD AUTO: 0.4 X10(3)/MCL (ref 0.1–1.3)
MONOCYTES NFR BLD AUTO: 14.7 %
NEUTROPHILS # BLD AUTO: 1.2 X10(3)/MCL (ref 2.1–9.2)
NEUTROPHILS NFR BLD AUTO: 42.9 %
PLATELET # BLD AUTO: 132 X10(3)/MCL (ref 130–400)
PMV BLD AUTO: 9.5 FL (ref 9.4–12.4)
POTASSIUM SERPL-SCNC: 4.7 MMOL/L (ref 3.5–5.1)
PROT SERPL-MCNC: 7.3 GM/DL (ref 5.8–7.6)
RBC # BLD AUTO: 3.44 X10(6)/MCL (ref 4.7–6.1)
SODIUM SERPL-SCNC: 140 MMOL/L (ref 136–145)
WBC # SPEC AUTO: 2.9 X10(3)/MCL (ref 4.5–11.5)

## 2021-07-06 ENCOUNTER — HISTORICAL (OUTPATIENT)
Dept: HEMATOLOGY/ONCOLOGY | Facility: CLINIC | Age: 76
End: 2021-07-06

## 2021-07-06 LAB
ABS NEUT (OLG): 0.84 X10(3)/MCL (ref 2.1–9.2)
ALBUMIN SERPL-MCNC: 3.8 GM/DL (ref 3.4–4.8)
ALBUMIN/GLOB SERPL: 1.2 RATIO (ref 1.1–2)
ALP SERPL-CCNC: 71 UNIT/L (ref 40–150)
ALT SERPL-CCNC: 10 UNIT/L (ref 0–55)
AST SERPL-CCNC: 15 UNIT/L (ref 5–34)
BASOPHILS # BLD AUTO: 0 X10(3)/MCL (ref 0–0.2)
BASOPHILS NFR BLD AUTO: 0.4 %
BILIRUB SERPL-MCNC: 0.6 MG/DL
BILIRUBIN DIRECT+TOT PNL SERPL-MCNC: 0.2 MG/DL (ref 0–0.5)
BILIRUBIN DIRECT+TOT PNL SERPL-MCNC: 0.4 MG/DL (ref 0–0.8)
BUN SERPL-MCNC: 15.7 MG/DL (ref 8.4–25.7)
CALCIUM SERPL-MCNC: 9.5 MG/DL (ref 8.8–10)
CHLORIDE SERPL-SCNC: 102 MMOL/L (ref 98–107)
CO2 SERPL-SCNC: 27 MMOL/L (ref 23–31)
CREAT SERPL-MCNC: 1.01 MG/DL (ref 0.73–1.18)
EOSINOPHIL # BLD AUTO: 0 X10(3)/MCL (ref 0–0.9)
EOSINOPHIL NFR BLD AUTO: 0 %
ERYTHROCYTE [DISTWIDTH] IN BLOOD BY AUTOMATED COUNT: 15.5 % (ref 11.5–17)
GLOBULIN SER-MCNC: 3.3 GM/DL (ref 2.4–3.5)
GLUCOSE SERPL-MCNC: 140 MG/DL (ref 82–115)
HCT VFR BLD AUTO: 36 % (ref 42–52)
HGB BLD-MCNC: 11.8 GM/DL (ref 14–18)
LDH SERPL-CCNC: 178 UNIT/L (ref 140–271)
LYMPHOCYTES # BLD AUTO: 1.4 X10(3)/MCL (ref 0.6–4.6)
LYMPHOCYTES NFR BLD AUTO: 50.9 %
MCH RBC QN AUTO: 34.7 PG (ref 27–31)
MCHC RBC AUTO-ENTMCNC: 32.8 GM/DL (ref 33–36)
MCV RBC AUTO: 105.9 FL (ref 80–94)
MONOCYTES # BLD AUTO: 0.4 X10(3)/MCL (ref 0.1–1.3)
MONOCYTES NFR BLD AUTO: 14.7 %
NEUTROPHILS # BLD AUTO: 0.8 X10(3)/MCL (ref 2.1–9.2)
NEUTROPHILS NFR BLD AUTO: 30.1 %
PLATELET # BLD AUTO: 116 X10(3)/MCL (ref 130–400)
PMV BLD AUTO: 9.5 FL (ref 9.4–12.4)
POTASSIUM SERPL-SCNC: 5.3 MMOL/L (ref 3.5–5.1)
PROT SERPL-MCNC: 7.1 GM/DL (ref 5.8–7.6)
RBC # BLD AUTO: 3.4 X10(6)/MCL (ref 4.7–6.1)
SODIUM SERPL-SCNC: 139 MMOL/L (ref 136–145)
WBC # SPEC AUTO: 2.8 X10(3)/MCL (ref 4.5–11.5)

## 2021-07-16 ENCOUNTER — HISTORICAL (OUTPATIENT)
Dept: INFUSION THERAPY | Facility: HOSPITAL | Age: 76
End: 2021-07-16

## 2021-08-13 ENCOUNTER — HISTORICAL (OUTPATIENT)
Dept: HEMATOLOGY/ONCOLOGY | Facility: CLINIC | Age: 76
End: 2021-08-13

## 2021-08-13 LAB
ABS NEUT (OLG): 0.85 X10(3)/MCL (ref 2.1–9.2)
ALBUMIN SERPL-MCNC: 4.1 GM/DL (ref 3.4–4.8)
ALBUMIN/GLOB SERPL: 1.3 RATIO (ref 1.1–2)
ALP SERPL-CCNC: 64 UNIT/L (ref 40–150)
ALT SERPL-CCNC: 10 UNIT/L (ref 0–55)
AST SERPL-CCNC: 12 UNIT/L (ref 5–34)
BASOPHILS # BLD AUTO: 0 X10(3)/MCL (ref 0–0.2)
BASOPHILS NFR BLD AUTO: 0.4 %
BILIRUB SERPL-MCNC: 0.8 MG/DL
BILIRUBIN DIRECT+TOT PNL SERPL-MCNC: 0.3 MG/DL (ref 0–0.5)
BILIRUBIN DIRECT+TOT PNL SERPL-MCNC: 0.5 MG/DL (ref 0–0.8)
BUN SERPL-MCNC: 23.5 MG/DL (ref 8.4–25.7)
CALCIUM SERPL-MCNC: 9.7 MG/DL (ref 8.8–10)
CHLORIDE SERPL-SCNC: 100 MMOL/L (ref 98–107)
CO2 SERPL-SCNC: 25 MMOL/L (ref 23–31)
CREAT SERPL-MCNC: 1.04 MG/DL (ref 0.73–1.18)
EOSINOPHIL # BLD AUTO: 0 X10(3)/MCL (ref 0–0.9)
EOSINOPHIL NFR BLD AUTO: 0 %
ERYTHROCYTE [DISTWIDTH] IN BLOOD BY AUTOMATED COUNT: 15.7 % (ref 11.5–17)
GLOBULIN SER-MCNC: 3.1 GM/DL (ref 2.4–3.5)
GLUCOSE SERPL-MCNC: 154 MG/DL (ref 82–115)
HCT VFR BLD AUTO: 35.3 % (ref 42–52)
HGB BLD-MCNC: 11.3 GM/DL (ref 14–18)
LDH SERPL-CCNC: 188 UNIT/L (ref 140–271)
LYMPHOCYTES # BLD AUTO: 1.4 X10(3)/MCL (ref 0.6–4.6)
LYMPHOCYTES NFR BLD AUTO: 52.3 %
MCH RBC QN AUTO: 34.2 PG (ref 27–31)
MCHC RBC AUTO-ENTMCNC: 32 GM/DL (ref 33–36)
MCV RBC AUTO: 107 FL (ref 80–94)
MONOCYTES # BLD AUTO: 0.3 X10(3)/MCL (ref 0.1–1.3)
MONOCYTES NFR BLD AUTO: 13 %
NEUTROPHILS # BLD AUTO: 0.8 X10(3)/MCL (ref 2.1–9.2)
NEUTROPHILS NFR BLD AUTO: 32.4 %
PLATELET # BLD AUTO: 133 X10(3)/MCL (ref 130–400)
PMV BLD AUTO: 9.2 FL (ref 9.4–12.4)
POTASSIUM SERPL-SCNC: 4.3 MMOL/L (ref 3.5–5.1)
PROT SERPL-MCNC: 7.2 GM/DL (ref 5.8–7.6)
RBC # BLD AUTO: 3.3 X10(6)/MCL (ref 4.7–6.1)
SODIUM SERPL-SCNC: 139 MMOL/L (ref 136–145)
WBC # SPEC AUTO: 2.6 X10(3)/MCL (ref 4.5–11.5)

## 2021-10-12 ENCOUNTER — HISTORICAL (OUTPATIENT)
Dept: ADMINISTRATIVE | Facility: HOSPITAL | Age: 76
End: 2021-10-12

## 2021-10-12 LAB
ABS NEUT (OLG): 2 X10(3)/MCL (ref 2.1–9.2)
ALBUMIN SERPL-MCNC: 3.9 GM/DL (ref 3.4–4.8)
ALBUMIN/GLOB SERPL: 1.1 RATIO (ref 1.1–2)
ALP SERPL-CCNC: 88 UNIT/L (ref 40–150)
ALT SERPL-CCNC: 9 UNIT/L (ref 0–55)
AST SERPL-CCNC: 12 UNIT/L (ref 5–34)
BASOPHILS # BLD AUTO: 0 X10(3)/MCL (ref 0–0.2)
BASOPHILS NFR BLD AUTO: 0.6 %
BILIRUB SERPL-MCNC: 0.3 MG/DL
BILIRUBIN DIRECT+TOT PNL SERPL-MCNC: 0.1 MG/DL (ref 0–0.8)
BILIRUBIN DIRECT+TOT PNL SERPL-MCNC: 0.2 MG/DL (ref 0–0.5)
BUN SERPL-MCNC: 23.2 MG/DL (ref 8.4–25.7)
CALCIUM SERPL-MCNC: 9.9 MG/DL (ref 8.8–10)
CHLORIDE SERPL-SCNC: 105 MMOL/L (ref 98–107)
CO2 SERPL-SCNC: 24 MMOL/L (ref 23–31)
CREAT SERPL-MCNC: 0.97 MG/DL (ref 0.73–1.18)
EOSINOPHIL # BLD AUTO: 0 X10(3)/MCL (ref 0–0.9)
EOSINOPHIL NFR BLD AUTO: 0 %
ERYTHROCYTE [DISTWIDTH] IN BLOOD BY AUTOMATED COUNT: 15.6 % (ref 11.5–17)
GLOBULIN SER-MCNC: 3.5 GM/DL (ref 2.4–3.5)
GLUCOSE SERPL-MCNC: 138 MG/DL (ref 82–115)
HCT VFR BLD AUTO: 37.1 % (ref 42–52)
HGB BLD-MCNC: 11.1 GM/DL (ref 14–18)
HYPOCHROMIA BLD QL SMEAR: ABNORMAL
LDH SERPL-CCNC: 155 UNIT/L (ref 140–271)
LYMPHOCYTES # BLD AUTO: 2.2 X10(3)/MCL (ref 0.6–4.6)
LYMPHOCYTES NFR BLD AUTO: 41.8 %
LYMPHOCYTES NFR BLD MANUAL: 44 % (ref 13–40)
MACROCYTES BLD QL SMEAR: ABNORMAL
MCH RBC QN AUTO: 32.8 PG (ref 27–31)
MCHC RBC AUTO-ENTMCNC: 29.9 GM/DL (ref 33–36)
MCV RBC AUTO: 109.8 FL (ref 80–94)
METAMYELOCYTES NFR BLD MANUAL: 2 %
MONOCYTES # BLD AUTO: 0.9 X10(3)/MCL (ref 0.1–1.3)
MONOCYTES NFR BLD AUTO: 17.5 %
MONOCYTES NFR BLD MANUAL: 16 % (ref 2–11)
NEUTROPHILS # BLD AUTO: 2 X10(3)/MCL (ref 2.1–9.2)
NEUTROPHILS NFR BLD AUTO: 38.6 %
NEUTROPHILS NFR BLD MANUAL: 38 % (ref 47–80)
PLATELET # BLD AUTO: 318 X10(3)/MCL (ref 130–400)
PLATELET # BLD EST: NORMAL 10*3/UL
PMV BLD AUTO: 9.8 FL (ref 9.4–12.4)
POIKILOCYTOSIS BLD QL SMEAR: ABNORMAL
POLYCHROMASIA BLD QL SMEAR: ABNORMAL
POTASSIUM SERPL-SCNC: 5.2 MMOL/L (ref 3.5–5.1)
PROT SERPL-MCNC: 7.4 GM/DL (ref 5.8–7.6)
RBC # BLD AUTO: 3.38 X10(6)/MCL (ref 4.7–6.1)
RBC MORPH BLD: ABNORMAL
SODIUM SERPL-SCNC: 142 MMOL/L (ref 136–145)
WBC # SPEC AUTO: 5.2 X10(3)/MCL (ref 4.5–11.5)

## 2021-11-03 ENCOUNTER — HISTORICAL (OUTPATIENT)
Dept: ADMINISTRATIVE | Facility: HOSPITAL | Age: 76
End: 2021-11-03

## 2021-11-03 LAB
ABS NEUT (OLG): 4.67 X10(3)/MCL (ref 2.1–9.2)
ALBUMIN SERPL-MCNC: 3.8 GM/DL (ref 3.4–4.8)
ALBUMIN/GLOB SERPL: 1 RATIO (ref 1.1–2)
ALP SERPL-CCNC: 86 UNIT/L (ref 40–150)
ALT SERPL-CCNC: 11 UNIT/L (ref 0–55)
ANISOCYTOSIS BLD QL SMEAR: 1
AST SERPL-CCNC: 11 UNIT/L (ref 5–34)
BILIRUB SERPL-MCNC: 0.3 MG/DL
BILIRUBIN DIRECT+TOT PNL SERPL-MCNC: 0.1 MG/DL (ref 0–0.8)
BILIRUBIN DIRECT+TOT PNL SERPL-MCNC: 0.2 MG/DL (ref 0–0.5)
BUN SERPL-MCNC: 30.2 MG/DL (ref 8.4–25.7)
BURR CELLS BLD QL SMEAR: 1 (ref 0–0)
CALCIUM SERPL-MCNC: 10 MG/DL (ref 8.7–10.5)
CHLORIDE SERPL-SCNC: 100 MMOL/L (ref 98–107)
CO2 SERPL-SCNC: 25 MMOL/L (ref 23–31)
CREAT SERPL-MCNC: 1.09 MG/DL (ref 0.73–1.18)
ERYTHROCYTE [DISTWIDTH] IN BLOOD BY AUTOMATED COUNT: 16.1 % (ref 11.5–17)
EST. AVERAGE GLUCOSE BLD GHB EST-MCNC: 174.3 MG/DL
GLOBULIN SER-MCNC: 4 GM/DL (ref 2.4–3.5)
GLUCOSE SERPL-MCNC: 190 MG/DL (ref 82–115)
HBA1C MFR BLD: 7.7 %
HCT VFR BLD AUTO: 35.1 % (ref 42–52)
HGB BLD-MCNC: 11.2 GM/DL (ref 14–18)
LYMPHOCYTES NFR BLD MANUAL: 20 % (ref 13–40)
MACROCYTES BLD QL SMEAR: 2 /MCL
MCH RBC QN AUTO: 32.4 PG (ref 27–31)
MCHC RBC AUTO-ENTMCNC: 31.9 GM/DL (ref 33–36)
MCV RBC AUTO: 101.4 FL (ref 80–94)
METAMYELOCYTES NFR BLD MANUAL: 1 %
MONOCYTES NFR BLD MANUAL: 6 % (ref 2–11)
MYELOCYTES NFR BLD MANUAL: 2 %
NEUTROPHILS NFR BLD MANUAL: 71 % (ref 47–80)
NRBC BLD MANUAL-RTO: 5 %
PLATELET # BLD AUTO: 313 X10(3)/MCL (ref 130–400)
PLATELET # BLD EST: NORMAL 10*3/UL
PMV BLD AUTO: 11.3 FL (ref 7.4–10.4)
POIKILOCYTOSIS BLD QL SMEAR: 1
POTASSIUM SERPL-SCNC: 4.8 MMOL/L (ref 3.5–5.1)
PROT SERPL-MCNC: 7.8 GM/DL (ref 5.8–7.6)
RBC # BLD AUTO: 3.46 X10(6)/MCL (ref 4.7–6.1)
SODIUM SERPL-SCNC: 139 MMOL/L (ref 136–145)
WBC # SPEC AUTO: 7.4 X10(3)/MCL (ref 4.5–11.5)

## 2021-12-07 ENCOUNTER — HISTORICAL (OUTPATIENT)
Dept: ADMINISTRATIVE | Facility: HOSPITAL | Age: 76
End: 2021-12-07

## 2021-12-07 LAB
ABS NEUT (OLG): 2.25 X10(3)/MCL (ref 2.1–9.2)
ALBUMIN SERPL-MCNC: 3.8 GM/DL (ref 3.4–4.8)
ALBUMIN/GLOB SERPL: 1 RATIO (ref 1.1–2)
ALP SERPL-CCNC: 86 UNIT/L (ref 40–150)
ALT SERPL-CCNC: 12 UNIT/L (ref 0–55)
AST SERPL-CCNC: 14 UNIT/L (ref 5–34)
BASOPHILS # BLD AUTO: 0 X10(3)/MCL (ref 0–0.2)
BASOPHILS NFR BLD AUTO: 0.6 %
BILIRUB SERPL-MCNC: 0.3 MG/DL
BILIRUBIN DIRECT+TOT PNL SERPL-MCNC: 0.1 MG/DL (ref 0–0.5)
BILIRUBIN DIRECT+TOT PNL SERPL-MCNC: 0.2 MG/DL (ref 0–0.8)
BUN SERPL-MCNC: 25.2 MG/DL (ref 8.4–25.7)
CALCIUM SERPL-MCNC: 10.3 MG/DL (ref 8.7–10.5)
CHLORIDE SERPL-SCNC: 101 MMOL/L (ref 98–107)
CO2 SERPL-SCNC: 26 MMOL/L (ref 23–31)
CREAT SERPL-MCNC: 1.01 MG/DL (ref 0.73–1.18)
EOSINOPHIL # BLD AUTO: 0 X10(3)/MCL (ref 0–0.9)
EOSINOPHIL NFR BLD AUTO: 0.2 %
ERYTHROCYTE [DISTWIDTH] IN BLOOD BY AUTOMATED COUNT: 18.3 % (ref 11.5–17)
GLOBULIN SER-MCNC: 3.7 GM/DL (ref 2.4–3.5)
GLUCOSE SERPL-MCNC: 133 MG/DL (ref 82–115)
HCT VFR BLD AUTO: 37.3 % (ref 42–52)
HGB BLD-MCNC: 11.5 GM/DL (ref 14–18)
LYMPHOCYTES # BLD AUTO: 3 X10(3)/MCL (ref 0.6–4.6)
LYMPHOCYTES NFR BLD AUTO: 45.3 %
MCH RBC QN AUTO: 31.9 PG (ref 27–31)
MCHC RBC AUTO-ENTMCNC: 30.8 GM/DL (ref 33–36)
MCV RBC AUTO: 103.6 FL (ref 80–94)
MONOCYTES # BLD AUTO: 0.8 X10(3)/MCL (ref 0.1–1.3)
MONOCYTES NFR BLD AUTO: 11.4 %
NEUTROPHILS # BLD AUTO: 2.2 X10(3)/MCL (ref 2.1–9.2)
NEUTROPHILS NFR BLD AUTO: 33.8 %
PLATELET # BLD AUTO: 414 X10(3)/MCL (ref 130–400)
PMV BLD AUTO: 10.5 FL (ref 9.4–12.4)
POTASSIUM SERPL-SCNC: 4.8 MMOL/L (ref 3.5–5.1)
PROT SERPL-MCNC: 7.5 GM/DL (ref 5.8–7.6)
RBC # BLD AUTO: 3.6 X10(6)/MCL (ref 4.7–6.1)
SODIUM SERPL-SCNC: 139 MMOL/L (ref 136–145)
WBC # SPEC AUTO: 6.7 X10(3)/MCL (ref 4.5–11.5)

## 2022-03-04 ENCOUNTER — HISTORICAL (OUTPATIENT)
Dept: ADMINISTRATIVE | Facility: HOSPITAL | Age: 77
End: 2022-03-04

## 2022-03-04 LAB
ABS NEUT CALC (OHS): 2940 (ref 2100–9200)
ALBUMIN SERPL-MCNC: 3.6 G/DL (ref 3.4–4.8)
ALBUMIN/GLOB SERPL: 1 {RATIO} (ref 1.1–2)
ALP SERPL-CCNC: 84 U/L (ref 40–150)
ALT SERPL-CCNC: 12 U/L (ref 0–55)
ANISOCYTOSIS BLD QL SMEAR: 1
APPEARANCE, UA: CLEAR
AST SERPL-CCNC: 13 U/L (ref 5–34)
BACTERIA SPEC CULT: NORMAL
BILIRUB SERPL-MCNC: 0.4 MG/DL
BILIRUB UR QL STRIP: NEGATIVE
BILIRUBIN DIRECT+TOT PNL SERPL-MCNC: 0.2 (ref 0–0.5)
BILIRUBIN DIRECT+TOT PNL SERPL-MCNC: 0.2 (ref 0–0.8)
BUDDING YEAST: NORMAL
BUN SERPL-MCNC: 23 MG/DL (ref 8.4–25.7)
CALCIUM SERPL-MCNC: 9.3 MG/DL (ref 8.7–10.5)
CASTS, UA: NORMAL
CHLORIDE SERPL-SCNC: 100 MMOL/L (ref 98–107)
CHOLEST SERPL-MCNC: 167 MG/DL
CHOLEST/HDLC SERPL: 4 {RATIO} (ref 0–5)
CO2 SERPL-SCNC: 28 MMOL/L (ref 23–31)
COLOR UR: YELLOW
CREAT SERPL-MCNC: 0.98 MG/DL (ref 0.73–1.18)
CREAT UR-MCNC: 82 MG/DL (ref 58–161)
CRYSTALS: NORMAL
EST. AVERAGE GLUCOSE BLD GHB EST-MCNC: 177.2 MG/DL
GLOBULIN SER-MCNC: 3.7 G/DL (ref 2.4–3.5)
GLUCOSE (UA): NORMAL
GLUCOSE SERPL-MCNC: 142 MG/DL (ref 82–115)
HBA1C MFR BLD: 7.8 %
HDLC SERPL-MCNC: 39 MG/DL (ref 35–60)
HEMOLYSIS INTERF INDEX SERPL-ACNC: 2
HGB UR QL STRIP: NEGATIVE
ICTERIC INTERF INDEX SERPL-ACNC: 0
INSTRUMENT WBC (OLG): 7
KETONES UR QL STRIP: NEGATIVE
LDLC SERPL CALC-MCNC: 96 MG/DL (ref 50–140)
LEUKOCYTE ESTERASE UR QL STRIP: NEGATIVE
LIPEMIC INTERF INDEX SERPL-ACNC: 2
LYMPHOCYTES NFR BLD MANUAL: 3500 % (ref 3400–13700)
LYMPHOCYTES NFR BLD MANUAL: 50 % (ref 13–40)
MACROCYTES BLD QL SMEAR: 2
MICROALBUMIN UR-MCNC: 20
MICROALBUMIN/CREAT RATIO PNL UR: 24.4 (ref 0–30)
MONOCYTES NFR BLD MANUAL: 630 % (ref 100–1300)
MONOCYTES NFR BLD MANUAL: 9 % (ref 2–11)
MYELOCYTES NFR BLD MANUAL: 3 %
NEUTROPHILS NFR BLD MANUAL: 39 % (ref 47–80)
NITRITE UR QL STRIP: NEGATIVE
NRBC BLD MANUAL-RTO: 5 %
PH UR STRIP: 6.5 [PH] (ref 5–9)
PLATELET # BLD EST: NORMAL 10*3/UL
POIKILOCYTOSIS BLD QL SMEAR: 1
POTASSIUM SERPL-SCNC: 4.7 MMOL/L (ref 3.5–5.1)
PROT SERPL-MCNC: 7.3 G/DL (ref 5.8–7.6)
PROT UR QL STRIP: NEGATIVE
RBC #/AREA URNS HPF: NORMAL /[HPF] (ref 0–2)
SMALL ROUND CELLS, UA: NORMAL
SODIUM SERPL-SCNC: 137 MMOL/L (ref 136–145)
SP GR UR STRIP: 1.03 (ref 1–1.03)
SPERM URNS QL MICRO: NORMAL
SQUAMOUS EPITHELIAL, UA: NORMAL (ref 0–4)
TARGETS BLD QL SMEAR: 1
TRIGL SERPL-MCNC: 158 MG/DL (ref 34–140)
UROBILINOGEN UR STRIP-ACNC: 1
VIT B12 SERPL-MCNC: >2000 PG/ML (ref 213–816)
VLDLC SERPL CALC-MCNC: 32 MG/DL
WBC #/AREA URNS HPF: NORMAL /[HPF] (ref 0–2)

## 2022-03-11 ENCOUNTER — HISTORICAL (OUTPATIENT)
Dept: HEMATOLOGY/ONCOLOGY | Facility: CLINIC | Age: 77
End: 2022-03-11

## 2022-03-11 LAB
LDH SERPL-CCNC: 182 U/L (ref 140–271)
PSA SERPL-MCNC: 0.86 NG/ML

## 2022-04-11 ENCOUNTER — HISTORICAL (OUTPATIENT)
Dept: ADMINISTRATIVE | Facility: HOSPITAL | Age: 77
End: 2022-04-11
Payer: MEDICARE

## 2022-04-26 VITALS
OXYGEN SATURATION: 96 % | HEIGHT: 74 IN | WEIGHT: 234.81 LBS | BODY MASS INDEX: 30.14 KG/M2 | DIASTOLIC BLOOD PRESSURE: 70 MMHG | SYSTOLIC BLOOD PRESSURE: 143 MMHG

## 2022-05-04 DIAGNOSIS — E13.9 DIABETES 1.5, MANAGED AS TYPE 2: Primary | ICD-10-CM

## 2022-05-04 RX ORDER — GLIPIZIDE AND METFORMIN HCL 5; 500 MG/1; MG/1
2 TABLET, FILM COATED ORAL
Qty: 360 TABLET | Refills: 0 | OUTPATIENT
Start: 2022-05-04 | End: 2022-05-09 | Stop reason: SDUPTHER

## 2022-05-04 RX ORDER — GLIPIZIDE AND METFORMIN HCL 5; 500 MG/1; MG/1
TABLET, FILM COATED ORAL
COMMUNITY
Start: 2021-05-03 | End: 2022-05-04 | Stop reason: SDUPTHER

## 2022-05-09 DIAGNOSIS — E13.9 DIABETES 1.5, MANAGED AS TYPE 2: ICD-10-CM

## 2022-05-11 RX ORDER — GLIPIZIDE AND METFORMIN HCL 5; 500 MG/1; MG/1
2 TABLET, FILM COATED ORAL
Qty: 360 TABLET | Refills: 0 | Status: SHIPPED | OUTPATIENT
Start: 2022-05-11 | End: 2024-04-02

## 2022-06-09 DIAGNOSIS — D61.818 PANCYTOPENIA: Primary | ICD-10-CM

## 2022-06-13 ENCOUNTER — LAB VISIT (OUTPATIENT)
Dept: LAB | Facility: HOSPITAL | Age: 77
End: 2022-06-13
Attending: INTERNAL MEDICINE
Payer: MEDICARE

## 2022-06-13 DIAGNOSIS — D61.818 PANCYTOPENIA: ICD-10-CM

## 2022-06-13 LAB
ALBUMIN SERPL-MCNC: 3.6 GM/DL (ref 3.4–4.8)
ALBUMIN/GLOB SERPL: 1.1 RATIO (ref 1.1–2)
ALP SERPL-CCNC: 83 UNIT/L (ref 40–150)
ALT SERPL-CCNC: 11 UNIT/L (ref 0–55)
AST SERPL-CCNC: 12 UNIT/L (ref 5–34)
BASOPHILS # BLD AUTO: 0.06 X10(3)/MCL (ref 0–0.2)
BASOPHILS NFR BLD AUTO: 0.7 %
BILIRUBIN DIRECT+TOT PNL SERPL-MCNC: 0.4 MG/DL
BUN SERPL-MCNC: 26.8 MG/DL (ref 8.4–25.7)
CALCIUM SERPL-MCNC: 9 MG/DL (ref 8.8–10)
CHLORIDE SERPL-SCNC: 100 MMOL/L (ref 98–107)
CO2 SERPL-SCNC: 25 MMOL/L (ref 23–31)
CREAT SERPL-MCNC: 1.14 MG/DL (ref 0.73–1.18)
EOSINOPHIL # BLD AUTO: 0 X10(3)/MCL (ref 0–0.9)
EOSINOPHIL NFR BLD AUTO: 0 %
ERYTHROCYTE [DISTWIDTH] IN BLOOD BY AUTOMATED COUNT: 19.8 % (ref 11.5–17)
GLOBULIN SER-MCNC: 3.4 GM/DL (ref 2.4–3.5)
GLUCOSE SERPL-MCNC: 163 MG/DL (ref 82–115)
HCT VFR BLD AUTO: 34.6 % (ref 42–52)
HGB BLD-MCNC: 10.8 GM/DL (ref 14–18)
IMM GRANULOCYTES # BLD AUTO: 0.5 X10(3)/MCL (ref 0–0.02)
IMM GRANULOCYTES NFR BLD AUTO: 5.7 % (ref 0–0.43)
LDH SERPL-CCNC: 186 U/L (ref 125–220)
LYMPHOCYTES # BLD AUTO: 3.97 X10(3)/MCL (ref 0.6–4.6)
LYMPHOCYTES NFR BLD AUTO: 45.2 %
MCH RBC QN AUTO: 34.2 PG (ref 27–31)
MCHC RBC AUTO-ENTMCNC: 31.2 MG/DL (ref 33–36)
MCV RBC AUTO: 109.5 FL (ref 80–94)
MONOCYTES # BLD AUTO: 0.83 X10(3)/MCL (ref 0.1–1.3)
MONOCYTES NFR BLD AUTO: 9.5 %
NEUTROPHILS # BLD AUTO: 3.4 X10(3)/MCL (ref 2.1–9.2)
NEUTROPHILS NFR BLD AUTO: 38.9 %
PLATELET # BLD AUTO: 311 X10(3)/MCL (ref 130–400)
PMV BLD AUTO: 9.8 FL (ref 9.4–12.4)
POTASSIUM SERPL-SCNC: 4.6 MMOL/L (ref 3.5–5.1)
PROT SERPL-MCNC: 7 GM/DL (ref 5.8–7.6)
RBC # BLD AUTO: 3.16 X10(6)/MCL (ref 4.7–6.1)
SODIUM SERPL-SCNC: 138 MMOL/L (ref 136–145)
WBC # SPEC AUTO: 8.8 X10(3)/MCL (ref 4.5–11.5)

## 2022-06-13 PROCEDURE — 36415 COLL VENOUS BLD VENIPUNCTURE: CPT

## 2022-06-13 PROCEDURE — 83615 LACTATE (LD) (LDH) ENZYME: CPT

## 2022-06-13 PROCEDURE — 85025 COMPLETE CBC W/AUTO DIFF WBC: CPT

## 2022-06-13 PROCEDURE — 80053 COMPREHEN METABOLIC PANEL: CPT

## 2022-06-15 DIAGNOSIS — G25.2 INTENTION TREMOR: Primary | ICD-10-CM

## 2022-06-21 ENCOUNTER — OFFICE VISIT (OUTPATIENT)
Dept: HEMATOLOGY/ONCOLOGY | Facility: CLINIC | Age: 77
End: 2022-06-21
Payer: MEDICARE

## 2022-06-21 VITALS
HEART RATE: 78 BPM | SYSTOLIC BLOOD PRESSURE: 111 MMHG | TEMPERATURE: 99 F | HEIGHT: 74 IN | BODY MASS INDEX: 29.06 KG/M2 | OXYGEN SATURATION: 96 % | RESPIRATION RATE: 18 BRPM | WEIGHT: 226.44 LBS | DIASTOLIC BLOOD PRESSURE: 61 MMHG

## 2022-06-21 DIAGNOSIS — D72.820 MONOCLONAL B-CELL LYMPHOCYTOSIS: ICD-10-CM

## 2022-06-21 DIAGNOSIS — D61.818 PANCYTOPENIA: Primary | ICD-10-CM

## 2022-06-21 PROCEDURE — 99999 PR PBB SHADOW E&M-EST. PATIENT-LVL V: ICD-10-PCS | Mod: PBBFAC,,, | Performed by: INTERNAL MEDICINE

## 2022-06-21 PROCEDURE — 99215 OFFICE O/P EST HI 40 MIN: CPT | Mod: PBBFAC | Performed by: INTERNAL MEDICINE

## 2022-06-21 PROCEDURE — 99214 OFFICE O/P EST MOD 30 MIN: CPT | Mod: S$PBB,,, | Performed by: INTERNAL MEDICINE

## 2022-06-21 PROCEDURE — 99999 PR PBB SHADOW E&M-EST. PATIENT-LVL V: CPT | Mod: PBBFAC,,, | Performed by: INTERNAL MEDICINE

## 2022-06-21 PROCEDURE — 99214 PR OFFICE/OUTPT VISIT, EST, LEVL IV, 30-39 MIN: ICD-10-PCS | Mod: S$PBB,,, | Performed by: INTERNAL MEDICINE

## 2022-06-21 RX ORDER — GABAPENTIN 300 MG/1
300 CAPSULE ORAL
COMMUNITY
Start: 2021-07-09

## 2022-06-21 RX ORDER — MUPIROCIN 20 MG/G
OINTMENT TOPICAL
COMMUNITY
Start: 2022-05-05

## 2022-06-21 RX ORDER — ASPIRIN 81 MG/1
81 TABLET ORAL
COMMUNITY
Start: 2021-09-01

## 2022-06-21 RX ORDER — CEFDINIR 300 MG/1
300 CAPSULE ORAL
COMMUNITY
Start: 2022-05-05

## 2022-06-21 RX ORDER — FEXOFENADINE HCL 60 MG
60 TABLET ORAL
COMMUNITY
Start: 2021-09-01

## 2022-06-21 RX ORDER — DAPAGLIFLOZIN 10 MG/1
10 TABLET, FILM COATED ORAL
COMMUNITY
Start: 2022-04-28

## 2022-06-21 RX ORDER — METOPROLOL TARTRATE 50 MG/1
50 TABLET ORAL 2 TIMES DAILY
COMMUNITY
Start: 2022-05-25

## 2022-06-21 RX ORDER — IPRATROPIUM BROMIDE 21 UG/1
SPRAY, METERED NASAL
COMMUNITY
Start: 2022-05-05

## 2022-06-21 RX ORDER — AMLODIPINE BESYLATE 5 MG/1
5 TABLET ORAL
COMMUNITY
Start: 2022-01-25

## 2022-06-21 RX ORDER — FLUTICASONE PROPIONATE 50 MCG
2 SPRAY, SUSPENSION (ML) NASAL DAILY
COMMUNITY
Start: 2022-06-09

## 2022-06-21 RX ORDER — TAMSULOSIN HYDROCHLORIDE 0.4 MG/1
1 CAPSULE ORAL NIGHTLY
COMMUNITY
Start: 2022-06-06

## 2022-06-21 RX ORDER — NAPROXEN SODIUM 220 MG
TABLET ORAL
COMMUNITY
Start: 2022-03-03

## 2022-06-21 RX ORDER — PRAVASTATIN SODIUM 10 MG/1
10 TABLET ORAL
COMMUNITY
Start: 2021-06-30 | End: 2022-06-24

## 2022-06-21 RX ORDER — PEN NEEDLE, DIABETIC 30 GX3/16"
NEEDLE, DISPOSABLE MISCELLANEOUS
COMMUNITY
Start: 2022-02-22

## 2022-06-21 RX ORDER — ALLOPURINOL 300 MG/1
300 TABLET ORAL
COMMUNITY
Start: 2021-06-30

## 2022-06-21 RX ORDER — TRIAMTERENE AND HYDROCHLOROTHIAZIDE 37.5; 25 MG/1; MG/1
1 CAPSULE ORAL
COMMUNITY
Start: 2021-06-30 | End: 2024-04-02

## 2022-06-21 RX ORDER — PIOGLITAZONEHYDROCHLORIDE 45 MG/1
45 TABLET ORAL DAILY
COMMUNITY
Start: 2022-06-08

## 2022-06-21 RX ORDER — AZELASTINE 1 MG/ML
1 SPRAY, METERED NASAL
COMMUNITY
Start: 2022-03-08 | End: 2024-04-02

## 2022-06-21 NOTE — PROGRESS NOTES
Subjective:       Patient ID: Israel Ibarra is a 76 y.o. male.    Chief Complaint: Follow-up (Patient stated no new problems )      Diagnosis:  1.  Pancytopenia with macrocytosis  2.  Vitamin B12 deficiency  3.  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  Repeat bone marrow biopsy on 4/6/2021 showed that the small kappa restricted B-cell population now accounts for less than 1% of the viable lymphocytes.    Current Treatment:   1.  B12 supplementation.       Treatment History:  1.  Splenectomy    HPI   Patient who was seen by Dr. Neal at [facility] for pancytopenia on 8/19/2020.  It was noted that the patient had leukopenia dating back to 2014.  He then began to have neutropenia and anemia in January 2020 and pancytopenia began in July 2020.  Peripheral blood smear on 7/8/2020 showed moderate macrocytic normochromic anemia, moderate leukopenia with absolute neutropenia with a few reactive lymphocytes.  There was mild thrombocytopenia with normal platelet morphology.  The patient then underwent a bone marrow biopsy on 9/8/2020, this showed a hypercellular bone marrow for age (50 to 60%) with trilineage hematopoiesis, lymphoid cells normal in number with few small atypical forms, flow cytometry revealed a small 11% monoclonal B-cell population, indeterminate for malignancy.  FISH was normal, cytogenetics showed 46,Y,inv(X)(p22.1q24)?c[20]. Further work-up was unrevealing occluding HIV negative, hepatitis A IgM negative, hepatitis B core IgM negative, hepatitis B surface antigen negative, hepatitis C antibody negative.  He was however found to be B12 deficient and started on B12 supplementation.  He continued to follow with Dr. Shin, most recently seen on 2/23/2021 via telemedicine.  Due to persistent pancytopenia with macrocytosis without improvement after B12 supplementation, it was recommended that he undergo repeat bone marrow biopsy and  aspiration.  He then saw me initially on 3/8/2021.  At that time, he had no major complaints other than easy bruising.    Repeat bone marrow biopsy on 4/6/2021 revealed normocellular bone marrow with trilineage hematopoiesis and maturation, no increase in lymphocytes, no dysplasia identified, small kappa restricted B-cell population now accounting for less than 1% of viable lymphocytes.  He did have an admission to the hospital on 4/15/2021 for neutropenic fever, no organism identified.  CT scan of the neck, chest, abdomen, and pelvis on 6/14/2021 showed no lymph nodes that are enlarged by size criteria in the neck, chest, abdomen, or pelvis.  There is splenomegaly with the spleen measuring 14.1 x 8.4 cm.  There are a few subcentimeter right sided lung nodules that are nonspecific, but most likely related to either an infectious or inflammatory process.  There was enlarged somewhat irregular prostate extending exophytically into the posterior aspect of the urinary bladder, prostatic malignancy is not excluded.  There is diffuse wall thickening of the urinary bladder that may be related to chronic outlet obstruction or cystitis.  Case discussed at [facility] tumor board on 6/25/2021, decision made to move forward with splenectomy.  Patient underwent planned therapeutic splenectomy on 9/15/2021, tolerated well. Pathology negative for malignancy.        Interval History:   Patient presents to clinic for scheduled follow up. He was recently seen by his PCP, he has lost approximately 20-25 lb over a 2 month time frame.  This concerns him.  He has not been dieting are trying to lose this weight.    Past Medical History:   Diagnosis Date    Diabetes mellitus     Enlarged prostate     Gout     HLD (hyperlipidemia)     [place] (hard of hearing)     Hypertension     Monoclonal B-cell lymphocytosis     Pancytopenia       Past Surgical History:   Procedure Laterality Date    BACK SURGERY      BASAL CELL CARCINOMA EXCISION       head    BONE MARROW BIOPSY W/ ASPIRATION  04/09/2021    CATARACT EXTRACTION, BILATERAL      CORONARY ARTERY BYPASS GRAFT (CABG)      LAPAROSCOPIC SPLENECTOMY      SHOULDER SURGERY Left     SHOULDER SURGERY Bilateral     SKIN CANCER EXCISION      on face    TONSILLECTOMY       Social History     Socioeconomic History    Marital status:    Tobacco Use    Smoking status: Former Smoker     Types: Cigarettes    Smokeless tobacco: Former User    Tobacco comment: patient stated that he quit at age 30   Substance and Sexual Activity    Alcohol use: Never    Drug use: Never      Family History   Problem Relation Age of Onset    No Known Problems Mother     Leukemia Father     Diabetes Sister     Hypertension Sister     Heart disease Sister     Diabetes Brother     Hypertension Brother     Melanoma Brother     Diabetes Sister     Hypertension Sister     Diabetes Sister     Hypertension Sister     Diabetes Sister     Hypertension Sister     Diabetes Sister     Hypertension Sister     Diabetes Brother     Hypertension Brother     Heart disease Brother       Review of patient's allergies indicates:  No Known Allergies   Review of Systems   Constitutional: Positive for unexpected weight change. Negative for appetite change, fatigue and fever.   HENT: Negative for nasal congestion, nosebleeds, sinus pressure/congestion and sore throat.    Eyes: Negative for visual disturbance and eye dryness.   Respiratory: Negative for chest tightness and shortness of breath.    Cardiovascular: Negative for chest pain and leg swelling.   Gastrointestinal: Positive for constipation. Negative for abdominal pain and blood in stool.   Genitourinary: Negative for difficulty urinating, frequency and hematuria.   Musculoskeletal: Positive for back pain and leg pain.   Neurological: Negative for dizziness, syncope and numbness.   Hematological: Negative for adenopathy.         Objective:      Physical  Exam  Vitals reviewed.   Constitutional:       General: He is awake.      Appearance: Normal appearance.   HENT:      Head: Normocephalic and atraumatic.      Right Ear: Hearing normal.      Left Ear: Hearing normal.      Nose: Nose normal.   Eyes:      General: Lids are normal. Vision grossly intact.      Extraocular Movements: Extraocular movements intact.      Conjunctiva/sclera: Conjunctivae normal.   Cardiovascular:      Rate and Rhythm: Normal rate and regular rhythm.      Pulses: Normal pulses.      Heart sounds: Normal heart sounds.   Pulmonary:      Effort: Pulmonary effort is normal.      Breath sounds: Normal breath sounds. No wheezing, rhonchi or rales.   Chest:   Breasts:      Right: No axillary adenopathy or supraclavicular adenopathy.      Left: No axillary adenopathy or supraclavicular adenopathy.       Abdominal:      General: Bowel sounds are normal.      Palpations: Abdomen is soft.      Tenderness: There is no abdominal tenderness.   Musculoskeletal:      Cervical back: Full passive range of motion without pain.      Right lower leg: No edema.      Left lower leg: No edema.   Lymphadenopathy:      Cervical: No cervical adenopathy.      Upper Body:      Right upper body: No supraclavicular or axillary adenopathy.      Left upper body: No supraclavicular or axillary adenopathy.   Skin:     General: Skin is warm.   Neurological:      General: No focal deficit present.      Mental Status: He is alert and oriented to person, place, and time.   Psychiatric:         Attention and Perception: Attention normal.         Mood and Affect: Mood and affect normal.         Behavior: Behavior is cooperative.         LABS AND IMAGING REVIEWED IN EPIC          Assessment:     1.  Pancytopenia with macrocytosis  2.  Vitamin B12 deficiency  3.  Monoclonal B-cell population on flow cytometry from bone marrow biopsy from September 2020, this involves 11% of the bone marrow, repeat bone marrow showed less than 1%  involvement  4. Abnormal weight loss  5. Constipation and bowel habit irregularities         Plan:       Patient is status post splenectomy for splenomegaly.  Pathology negative for malignancy.  He does have this history of monoclonal B-cell population on bone marrow biopsy.    Will go ahead and get CT scan of the chest, abdomen, and pelvis due to abnormal weight loss    Will refer to Dr. Juan maynard with GI for constipation and abnormal bowel movements.    Return to clinic in 6 weeks.      Telly Mendoza II, MD

## 2022-08-02 PROBLEM — D61.818 PANCYTOPENIA: Status: ACTIVE | Noted: 2022-08-02

## 2022-08-02 NOTE — PROGRESS NOTES
Subjective:       Patient ID: Israel Ibarra is a 76 y.o. male.    Chief Complaint: No chief complaint on file.      Diagnosis:  1.  Pancytopenia with macrocytosis  2.  Vitamin B12 deficiency  3.  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  Repeat bone marrow biopsy on 4/6/2021 showed that the small kappa restricted B-cell population now accounts for less than 1% of the viable lymphocytes.    Current Treatment:   1.  B12 supplementation.       Treatment History:  1.  Splenectomy    HPI   Patient who was seen by Dr. Neal at [facility] for pancytopenia on 8/19/2020.  It was noted that the patient had leukopenia dating back to 2014.  He then began to have neutropenia and anemia in January 2020 and pancytopenia began in July 2020.  Peripheral blood smear on 7/8/2020 showed moderate macrocytic normochromic anemia, moderate leukopenia with absolute neutropenia with a few reactive lymphocytes.  There was mild thrombocytopenia with normal platelet morphology.  The patient then underwent a bone marrow biopsy on 9/8/2020, this showed a hypercellular bone marrow for age (50 to 60%) with trilineage hematopoiesis, lymphoid cells normal in number with few small atypical forms, flow cytometry revealed a small 11% monoclonal B-cell population, indeterminate for malignancy.  FISH was normal, cytogenetics showed 46,Y,inv(X)(p22.1q24)?c[20]. Further work-up was unrevealing occluding HIV negative, hepatitis A IgM negative, hepatitis B core IgM negative, hepatitis B surface antigen negative, hepatitis C antibody negative.  He was however found to be B12 deficient and started on B12 supplementation.  He continued to follow with Dr. Shin, most recently seen on 2/23/2021 via telemedicine.  Due to persistent pancytopenia with macrocytosis without improvement after B12 supplementation, it was recommended that he undergo repeat bone marrow biopsy and aspiration.  He then  saw me initially on 3/8/2021.  At that time, he had no major complaints other than easy bruising.    Repeat bone marrow biopsy on 4/6/2021 revealed normocellular bone marrow with trilineage hematopoiesis and maturation, no increase in lymphocytes, no dysplasia identified, small kappa restricted B-cell population now accounting for less than 1% of viable lymphocytes.  He did have an admission to the hospital on 4/15/2021 for neutropenic fever, no organism identified.  CT scan of the neck, chest, abdomen, and pelvis on 6/14/2021 showed no lymph nodes that are enlarged by size criteria in the neck, chest, abdomen, or pelvis.  There is splenomegaly with the spleen measuring 14.1 x 8.4 cm.  There are a few subcentimeter right sided lung nodules that are nonspecific, but most likely related to either an infectious or inflammatory process.  There was enlarged somewhat irregular prostate extending exophytically into the posterior aspect of the urinary bladder, prostatic malignancy is not excluded.  There is diffuse wall thickening of the urinary bladder that may be related to chronic outlet obstruction or cystitis.  Case discussed at [facility] tumor board on 6/25/2021, decision made to move forward with splenectomy.  Patient underwent planned therapeutic splenectomy on 9/15/2021, tolerated well. Pathology negative for malignancy.  Patient underwent a CT scan of the chest, abdomen, and pelvis on 07/01/2022 that revealed small lymph nodes in the small bowel mesentery that have increased in number and slightly enlarged.  Otherwise, no evidence of other pathology.        Interval History:   Patient presents to clinic for scheduled follow up.  His weight has stabilized.  He has not lost any further weight, in fact has gained a couple of lb.  He has no other B symptoms and no other issues.    Past Medical History:   Diagnosis Date    Diabetes mellitus     Enlarged prostate     Gout     HLD (hyperlipidemia)     [place] (hard of  hearing)     Hypertension     Monoclonal B-cell lymphocytosis     Pancytopenia       Past Surgical History:   Procedure Laterality Date    BACK SURGERY      BASAL CELL CARCINOMA EXCISION      head    BONE MARROW BIOPSY W/ ASPIRATION  04/09/2021    CATARACT EXTRACTION, BILATERAL      CORONARY ARTERY BYPASS GRAFT (CABG)      LAPAROSCOPIC SPLENECTOMY      SHOULDER SURGERY Left     SHOULDER SURGERY Bilateral     SKIN CANCER EXCISION      on face    TONSILLECTOMY       Social History     Socioeconomic History    Marital status:    Tobacco Use    Smoking status: Former Smoker     Types: Cigarettes    Smokeless tobacco: Former User    Tobacco comment: patient stated that he quit at age 30   Substance and Sexual Activity    Alcohol use: Never    Drug use: Never      Family History   Problem Relation Age of Onset    No Known Problems Mother     Leukemia Father     Diabetes Sister     Hypertension Sister     Heart disease Sister     Diabetes Brother     Hypertension Brother     Melanoma Brother     Diabetes Sister     Hypertension Sister     Diabetes Sister     Hypertension Sister     Diabetes Sister     Hypertension Sister     Diabetes Sister     Hypertension Sister     Diabetes Brother     Hypertension Brother     Heart disease Brother       Review of patient's allergies indicates:  No Known Allergies   Review of Systems   Constitutional: Positive for unexpected weight change. Negative for appetite change, fatigue and fever.   HENT: Negative for nasal congestion, nosebleeds, sinus pressure/congestion and sore throat.    Eyes: Negative for visual disturbance and eye dryness.   Respiratory: Negative for chest tightness and shortness of breath.    Cardiovascular: Negative for chest pain and leg swelling.   Gastrointestinal: Positive for constipation. Negative for abdominal pain and blood in stool.   Genitourinary: Negative for difficulty urinating, frequency and hematuria.    Musculoskeletal: Positive for back pain and leg pain.   Neurological: Negative for dizziness, syncope and numbness.   Hematological: Negative for adenopathy.         Objective:      Physical Exam  Vitals reviewed.   Constitutional:       General: He is awake.      Appearance: Normal appearance.   HENT:      Head: Normocephalic and atraumatic.      Right Ear: Hearing normal.      Left Ear: Hearing normal.      Nose: Nose normal.   Eyes:      General: Lids are normal. Vision grossly intact.      Extraocular Movements: Extraocular movements intact.      Conjunctiva/sclera: Conjunctivae normal.   Cardiovascular:      Rate and Rhythm: Normal rate and regular rhythm.      Pulses: Normal pulses.      Heart sounds: Normal heart sounds.   Pulmonary:      Effort: Pulmonary effort is normal.      Breath sounds: Normal breath sounds. No wheezing, rhonchi or rales.   Chest:   Breasts:      Right: No axillary adenopathy or supraclavicular adenopathy.      Left: No axillary adenopathy or supraclavicular adenopathy.       Abdominal:      General: Bowel sounds are normal.      Palpations: Abdomen is soft.      Tenderness: There is no abdominal tenderness.   Musculoskeletal:      Cervical back: Full passive range of motion without pain.      Right lower leg: No edema.      Left lower leg: No edema.   Lymphadenopathy:      Cervical: No cervical adenopathy.      Upper Body:      Right upper body: No supraclavicular or axillary adenopathy.      Left upper body: No supraclavicular or axillary adenopathy.   Skin:     General: Skin is warm.   Neurological:      General: No focal deficit present.      Mental Status: He is alert and oriented to person, place, and time.   Psychiatric:         Attention and Perception: Attention normal.         Mood and Affect: Mood and affect normal.         Behavior: Behavior is cooperative.         LABS AND IMAGING REVIEWED IN EPIC          Assessment:     1.  Pancytopenia with macrocytosis  2.  Vitamin  B12 deficiency  3.  Monoclonal B-cell population on flow cytometry from bone marrow biopsy from September 2020, this involves 11% of the bone marrow, repeat bone marrow showed less than 1% involvement  4. Abnormal weight loss  5. Constipation and bowel habit irregularities         Plan:       Patient is status post splenectomy for splenomegaly.  Pathology negative for malignancy.  He does have this history of monoclonal B-cell population on bone marrow biopsy.    We will go ahead and repeat CT scan and blood work in 3 months.  This is to monitor the small bowel mesentery lymph nodes.    Will refer to Dr. Juan Holguin with GI for constipation and abnormal bowel movements.    Return to clinic in 3 months.    Patient knows to come in sooner if any new signs or symptoms occur.      Telly Mendoza II, MD

## 2022-08-03 ENCOUNTER — OFFICE VISIT (OUTPATIENT)
Dept: HEMATOLOGY/ONCOLOGY | Facility: CLINIC | Age: 77
End: 2022-08-03
Payer: MEDICARE

## 2022-08-03 VITALS
TEMPERATURE: 98 F | SYSTOLIC BLOOD PRESSURE: 125 MMHG | HEART RATE: 62 BPM | BODY MASS INDEX: 29.13 KG/M2 | RESPIRATION RATE: 18 BRPM | HEIGHT: 74 IN | WEIGHT: 227 LBS | OXYGEN SATURATION: 98 % | DIASTOLIC BLOOD PRESSURE: 77 MMHG

## 2022-08-03 DIAGNOSIS — R63.4 WEIGHT LOSS: ICD-10-CM

## 2022-08-03 DIAGNOSIS — D61.818 PANCYTOPENIA: ICD-10-CM

## 2022-08-03 DIAGNOSIS — R59.1 LYMPHADENOPATHY: Primary | ICD-10-CM

## 2022-08-03 PROCEDURE — 99214 OFFICE O/P EST MOD 30 MIN: CPT | Mod: S$PBB,,, | Performed by: INTERNAL MEDICINE

## 2022-08-03 PROCEDURE — 99214 PR OFFICE/OUTPT VISIT, EST, LEVL IV, 30-39 MIN: ICD-10-PCS | Mod: S$PBB,,, | Performed by: INTERNAL MEDICINE

## 2022-08-03 PROCEDURE — 99999 PR PBB SHADOW E&M-EST. PATIENT-LVL V: CPT | Mod: PBBFAC,,, | Performed by: INTERNAL MEDICINE

## 2022-08-03 PROCEDURE — 99215 OFFICE O/P EST HI 40 MIN: CPT | Mod: PBBFAC | Performed by: INTERNAL MEDICINE

## 2022-08-03 PROCEDURE — 99999 PR PBB SHADOW E&M-EST. PATIENT-LVL V: ICD-10-PCS | Mod: PBBFAC,,, | Performed by: INTERNAL MEDICINE

## 2022-11-04 ENCOUNTER — LAB VISIT (OUTPATIENT)
Dept: LAB | Facility: HOSPITAL | Age: 77
End: 2022-11-04
Attending: INTERNAL MEDICINE
Payer: MEDICARE

## 2022-11-04 DIAGNOSIS — R59.1 LYMPHADENOPATHY: ICD-10-CM

## 2022-11-04 DIAGNOSIS — D61.818 PANCYTOPENIA: ICD-10-CM

## 2022-11-04 DIAGNOSIS — R63.4 WEIGHT LOSS: ICD-10-CM

## 2022-11-04 LAB
ALBUMIN SERPL-MCNC: 3.5 GM/DL (ref 3.4–4.8)
ALBUMIN/GLOB SERPL: 1 RATIO (ref 1.1–2)
ALP SERPL-CCNC: 90 UNIT/L (ref 40–150)
ALT SERPL-CCNC: 7 UNIT/L (ref 0–55)
AST SERPL-CCNC: 12 UNIT/L (ref 5–34)
BASOPHILS # BLD AUTO: 0.04 X10(3)/MCL (ref 0–0.2)
BASOPHILS NFR BLD AUTO: 0.5 %
BILIRUBIN DIRECT+TOT PNL SERPL-MCNC: 0.4 MG/DL
BUN SERPL-MCNC: 29.1 MG/DL (ref 8.4–25.7)
CALCIUM SERPL-MCNC: 9 MG/DL (ref 8.8–10)
CHLORIDE SERPL-SCNC: 105 MMOL/L (ref 98–107)
CO2 SERPL-SCNC: 23 MMOL/L (ref 23–31)
CREAT SERPL-MCNC: 1.5 MG/DL (ref 0.73–1.18)
EOSINOPHIL # BLD AUTO: 0 X10(3)/MCL (ref 0–0.9)
EOSINOPHIL NFR BLD AUTO: 0 %
ERYTHROCYTE [DISTWIDTH] IN BLOOD BY AUTOMATED COUNT: 21.7 % (ref 11.5–17)
GFR SERPLBLD CREATININE-BSD FMLA CKD-EPI: 48 MLS/MIN/1.73/M2
GLOBULIN SER-MCNC: 3.4 GM/DL (ref 2.4–3.5)
GLUCOSE SERPL-MCNC: 171 MG/DL (ref 82–115)
HCT VFR BLD AUTO: 29.7 % (ref 42–52)
HGB BLD-MCNC: 8.9 GM/DL (ref 14–18)
IMM GRANULOCYTES # BLD AUTO: 0.53 X10(3)/MCL (ref 0–0.04)
IMM GRANULOCYTES NFR BLD AUTO: 7.1 %
LYMPHOCYTES # BLD AUTO: 3.75 X10(3)/MCL (ref 0.6–4.6)
LYMPHOCYTES NFR BLD AUTO: 50.3 %
MCH RBC QN AUTO: 34.6 PG (ref 27–31)
MCHC RBC AUTO-ENTMCNC: 30 MG/DL (ref 33–36)
MCV RBC AUTO: 115.6 FL (ref 80–94)
MONOCYTES # BLD AUTO: 0.6 X10(3)/MCL (ref 0.1–1.3)
MONOCYTES NFR BLD AUTO: 8 %
NEUTROPHILS # BLD AUTO: 2.5 X10(3)/MCL (ref 2.1–9.2)
NEUTROPHILS NFR BLD AUTO: 34.1 %
PLATELET # BLD AUTO: 230 X10(3)/MCL (ref 130–400)
PMV BLD AUTO: 10.8 FL (ref 7.4–10.4)
POTASSIUM SERPL-SCNC: 5.3 MMOL/L (ref 3.5–5.1)
PROT SERPL-MCNC: 6.9 GM/DL (ref 5.8–7.6)
RBC # BLD AUTO: 2.57 X10(6)/MCL (ref 4.7–6.1)
SODIUM SERPL-SCNC: 139 MMOL/L (ref 136–145)
WBC # SPEC AUTO: 7.5 X10(3)/MCL (ref 4.5–11.5)

## 2022-11-04 PROCEDURE — 80053 COMPREHEN METABOLIC PANEL: CPT

## 2022-11-04 PROCEDURE — 36415 COLL VENOUS BLD VENIPUNCTURE: CPT

## 2022-11-04 PROCEDURE — 85025 COMPLETE CBC W/AUTO DIFF WBC: CPT

## 2022-11-07 ENCOUNTER — OFFICE VISIT (OUTPATIENT)
Dept: HEMATOLOGY/ONCOLOGY | Facility: CLINIC | Age: 77
End: 2022-11-07
Payer: MEDICARE

## 2022-11-07 VITALS
BODY MASS INDEX: 31.66 KG/M2 | RESPIRATION RATE: 18 BRPM | TEMPERATURE: 98 F | DIASTOLIC BLOOD PRESSURE: 69 MMHG | OXYGEN SATURATION: 96 % | SYSTOLIC BLOOD PRESSURE: 131 MMHG | WEIGHT: 246.69 LBS | HEIGHT: 74 IN | HEART RATE: 85 BPM

## 2022-11-07 DIAGNOSIS — D61.818 PANCYTOPENIA: Primary | ICD-10-CM

## 2022-11-07 PROCEDURE — 99999 PR PBB SHADOW E&M-EST. PATIENT-LVL V: CPT | Mod: PBBFAC,,, | Performed by: INTERNAL MEDICINE

## 2022-11-07 PROCEDURE — 99999 PR PBB SHADOW E&M-EST. PATIENT-LVL V: ICD-10-PCS | Mod: PBBFAC,,, | Performed by: INTERNAL MEDICINE

## 2022-11-07 PROCEDURE — 99215 OFFICE O/P EST HI 40 MIN: CPT | Mod: PBBFAC | Performed by: INTERNAL MEDICINE

## 2022-11-07 PROCEDURE — 99214 OFFICE O/P EST MOD 30 MIN: CPT | Mod: S$PBB,,, | Performed by: INTERNAL MEDICINE

## 2022-11-07 PROCEDURE — 99214 PR OFFICE/OUTPT VISIT, EST, LEVL IV, 30-39 MIN: ICD-10-PCS | Mod: S$PBB,,, | Performed by: INTERNAL MEDICINE

## 2022-11-07 NOTE — PROGRESS NOTES
Subjective:       Patient ID: Israel Ibarra is a 76 y.o. male.    Chief Complaint: Follow-up (Scheduled for back surgery on the 18th. Fell twice last week)      Diagnosis:  1.  Pancytopenia with macrocytosis  2.  Vitamin B12 deficiency  3.  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  Repeat bone marrow biopsy on 4/6/2021 showed that the small kappa restricted B-cell population now accounts for less than 1% of the viable lymphocytes.    Current Treatment:   1.  B12 supplementation.       Treatment History:  1.  Splenectomy    HPI:  Patient who was seen by Dr. Neal at [facility] for pancytopenia on 8/19/2020.  It was noted that the patient had leukopenia dating back to 2014.  He then began to have neutropenia and anemia in January 2020 and pancytopenia began in July 2020.  Peripheral blood smear on 7/8/2020 showed moderate macrocytic normochromic anemia, moderate leukopenia with absolute neutropenia with a few reactive lymphocytes.  There was mild thrombocytopenia with normal platelet morphology.  The patient then underwent a bone marrow biopsy on 9/8/2020, this showed a hypercellular bone marrow for age (50 to 60%) with trilineage hematopoiesis, lymphoid cells normal in number with few small atypical forms, flow cytometry revealed a small 11% monoclonal B-cell population, indeterminate for malignancy.  FISH was normal, cytogenetics showed 46,Y,inv(X)(p22.1q24)?c[20]. Further work-up was unrevealing occluding HIV negative, hepatitis A IgM negative, hepatitis B core IgM negative, hepatitis B surface antigen negative, hepatitis C antibody negative.  He was however found to be B12 deficient and started on B12 supplementation.  He continued to follow with Dr. Shin, most recently seen on 2/23/2021 via telemedicine.  Due to persistent pancytopenia with macrocytosis without improvement after B12 supplementation, it was recommended that he undergo repeat  bone marrow biopsy and aspiration.  He then saw me initially on 3/8/2021.  At that time, he had no major complaints other than easy bruising.    Repeat bone marrow biopsy on 4/6/2021 revealed normocellular bone marrow with trilineage hematopoiesis and maturation, no increase in lymphocytes, no dysplasia identified, small kappa restricted B-cell population now accounting for less than 1% of viable lymphocytes.  He did have an admission to the hospital on 4/15/2021 for neutropenic fever, no organism identified.  CT scan of the neck, chest, abdomen, and pelvis on 6/14/2021 showed no lymph nodes that are enlarged by size criteria in the neck, chest, abdomen, or pelvis.  There is splenomegaly with the spleen measuring 14.1 x 8.4 cm.  There are a few subcentimeter right sided lung nodules that are nonspecific, but most likely related to either an infectious or inflammatory process.  There was enlarged somewhat irregular prostate extending exophytically into the posterior aspect of the urinary bladder, prostatic malignancy is not excluded.  There is diffuse wall thickening of the urinary bladder that may be related to chronic outlet obstruction or cystitis.  Case discussed at [facility] tumor board on 6/25/2021, decision made to move forward with splenectomy.  Patient underwent planned therapeutic splenectomy on 9/15/2021, tolerated well. Pathology negative for malignancy.  Patient underwent a CT scan of the chest, abdomen, and pelvis on 07/01/2022 that revealed small lymph nodes in the small bowel mesentery that have increased in number and slightly enlarged.  Otherwise, no evidence of other pathology. CT scan of the chest, abdomen, and pelvis done on 10/27/2022 showed stable prominent mesenteric lymph nodes with no other abnormalities.    Interval History:   Patient presents to clinic for scheduled follow up.  His weight has stabilized.  He has not lost any further weight, in fact has gained a couple of pounds.  He has no  other B symptoms and no other issues.  He will be having back surgery on 11/18/2022.    Past Medical History:   Diagnosis Date    Diabetes mellitus     Enlarged prostate     Gout     HLD (hyperlipidemia)     [place] (hard of hearing)     Hypertension     Monoclonal B-cell lymphocytosis     Pancytopenia       Past Surgical History:   Procedure Laterality Date    BACK SURGERY      BASAL CELL CARCINOMA EXCISION      head    BONE MARROW BIOPSY W/ ASPIRATION  04/09/2021    CATARACT EXTRACTION, BILATERAL      CORONARY ARTERY BYPASS GRAFT (CABG)      LAPAROSCOPIC SPLENECTOMY      SHOULDER SURGERY Left     SHOULDER SURGERY Bilateral     SKIN CANCER EXCISION      on face    TONSILLECTOMY       Social History     Socioeconomic History    Marital status:    Tobacco Use    Smoking status: Former     Types: Cigarettes    Smokeless tobacco: Former    Tobacco comments:     patient stated that he quit at age 30   Substance and Sexual Activity    Alcohol use: Never    Drug use: Never      Family History   Problem Relation Age of Onset    No Known Problems Mother     Leukemia Father     Diabetes Sister     Hypertension Sister     Heart disease Sister     Diabetes Brother     Hypertension Brother     Melanoma Brother     Diabetes Sister     Hypertension Sister     Diabetes Sister     Hypertension Sister     Diabetes Sister     Hypertension Sister     Diabetes Sister     Hypertension Sister     Diabetes Brother     Hypertension Brother     Heart disease Brother       Review of patient's allergies indicates:  No Known Allergies   Review of Systems   Constitutional:  Negative for appetite change, fatigue, fever and unexpected weight change.   HENT:  Negative for nasal congestion, nosebleeds, sinus pressure/congestion and sore throat.    Eyes:  Negative for visual disturbance and eye dryness.   Respiratory:  Negative for chest tightness and shortness of breath.    Cardiovascular:  Negative for chest pain and leg swelling.    Gastrointestinal:  Positive for constipation. Negative for abdominal pain and blood in stool.   Genitourinary:  Negative for difficulty urinating, frequency and hematuria.   Musculoskeletal:  Positive for back pain and leg pain.   Neurological:  Negative for dizziness, syncope and numbness.   Hematological:  Negative for adenopathy.       Objective:      Physical Exam  Vitals reviewed.   Constitutional:       General: He is awake.      Appearance: Normal appearance.   HENT:      Head: Normocephalic and atraumatic.      Right Ear: Hearing normal.      Left Ear: Hearing normal.      Nose: Nose normal.   Eyes:      General: Lids are normal. Vision grossly intact.      Extraocular Movements: Extraocular movements intact.      Conjunctiva/sclera: Conjunctivae normal.   Cardiovascular:      Rate and Rhythm: Normal rate and regular rhythm.      Pulses: Normal pulses.      Heart sounds: Normal heart sounds.   Pulmonary:      Effort: Pulmonary effort is normal.      Breath sounds: Normal breath sounds. No wheezing, rhonchi or rales.   Abdominal:      General: Bowel sounds are normal.      Palpations: Abdomen is soft.      Tenderness: There is no abdominal tenderness.   Musculoskeletal:      Cervical back: Full passive range of motion without pain.      Right lower leg: No edema.      Left lower leg: No edema.   Lymphadenopathy:      Cervical: No cervical adenopathy.      Upper Body:      Right upper body: No supraclavicular or axillary adenopathy.      Left upper body: No supraclavicular or axillary adenopathy.   Skin:     General: Skin is warm.   Neurological:      General: No focal deficit present.      Mental Status: He is alert and oriented to person, place, and time.   Psychiatric:         Attention and Perception: Attention normal.         Mood and Affect: Mood and affect normal.         Behavior: Behavior is cooperative.       LABS AND IMAGING REVIEWED IN EPIC          Assessment:     1.  Pancytopenia with  macrocytosis  2.  Vitamin B12 deficiency  3.  Monoclonal B-cell population on flow cytometry from bone marrow biopsy from September 2020, this involves 11% of the bone marrow, repeat bone marrow showed less than 1% involvement  4. Abnormal weight loss  5. Constipation and bowel habit irregularities         Plan:       Patient is status post splenectomy for splenomegaly.  Pathology negative for malignancy.  He does have this history of monoclonal B-cell population on bone marrow biopsy.    Small bowel mesentery lymph nodes are stable.  We will see the patient back in 3 months with blood work.  Plan on scanning again in 6 months.    Patient does have an elevated MCV and anemia, I think his anemia is from chronic disease in his elevated MCV is secondary to having a splenectomy.    Return to clinic in 3 months.    Patient knows to come in sooner if any new signs or symptoms occur.      Telly Mendoza II, MD

## 2022-12-23 ENCOUNTER — DOCUMENTATION ONLY (OUTPATIENT)
Dept: SURGICAL ONCOLOGY | Facility: CLINIC | Age: 77
End: 2022-12-23
Payer: MEDICARE

## 2022-12-23 NOTE — PROGRESS NOTES
12-7-21  DR. MARYBETH MAYO OFFICE NOTE     Chief Complaint     MELANOMA F/U WITH ULTRASOUND    History of Present Illness         Patient returns for surveillance of melanoma of the right chest   He has no complaints today   Surveillance ultrasound was reviewed and shows no evidence of pathologic adenopathy in the right axilla    Review of Systems         14 point review of systems was performed and was negative except for those pertinent positives and negatives mentioned in the history of present illness    Physical Exam   Vitals & Measurements   HR: 76(Peripheral)  BP: 143/70   HT: 188.00 cm  WT: 106.500 kg  BMI: 30.13           General: Alert and oriented, No acute distress.  Eye: Pupils are equal, round and reactive to light, Extraocular movements are intact, Normal conjunctiva, Vision unchanged.  HENT: Normal hearing, Oral mucosa is moist, No pharyngeal erythema, Ear canals patent, No sinus tenderness.  Neck: Supple, Non-tender, No carotid bruit, No jugular venous distention, No lymphadenopathy, No thyromegaly.  Respiratory: Lungs are clear to auscultation, Respirations are non-labored, Breath sounds are equal, Symmetrical chest wall expansion, No chest wall tenderness.  Cardiovascular: Normal rate, Regular rhythm, No murmur, No gallop, Good pulses equal in all extremities, Normal peripheral perfusion, No edema.  Genitourinary: No costovertebral angle tenderness, No inguinal tenderness, No urethral discharge, No lesions.  Lymphatics: No lymphadenopathy neck, axilla, groin.  Musculoskeletal: Normal range of motion, Normal strength, No tenderness, No swelling, No deformity, Normal gait.  Integumentary: Warm, Pink, Intact, Moist, No pallor, No rash. Wide excision site without evidence of local recurrence  Cognition and Speech: Oriented, Speech clear and coherent, Functional cognition intact.   Abdomen: Soft nontender, nondistended, no palpable masses    Assessment/Plan           1. Melanoma C43.9       No evidence  of disease   Return to clinic in 1 year with surveillance right axillary ultrasound          Ordered:      Clinic Follow up, *Est. 12/07/22 3:00:00 CST, Order for future visit, Melanoma    Office/Outpatient Visit Level 4 Established [id number], Melanoma, [facility], 12/07/21 9:59:00 CST         Referrals       Clinic Follow up, *Est. 12/07/22 3:00:00 CST, Order for future visit, Melanoma     Problem List/Past Medical History     Ongoing   Diabetes mellitus    Enlarged prostate    Gout    HLD (hyperlipidemia)    [place] - Hard of hearing    Hypertension    Monoclonal B-cell lymphocytosis    Obesity    Pancytopenia    Historical   Diabetes type 2 on insulin    Heart attack    Procedure/Surgical History   Laparoscopy, surgical, splenectomy (09/02/2021)  Laparoscopy, surgical, splenectomy (09/02/2021)  Resection of Spleen, Percutaneous Endoscopic Approach (09/02/2021)  Robotic Assisted Procedure of [place], Percutaneous Approach (09/02/2021)  Splenectomy Robot Assist (.) (09/02/2021)  basal cell removal on head (08/19/2021)  skin cancer removal on face (06/29/2021)  Bone Marrow Biopsy and Aspiration (None) (04/06/2021)  Diagnostic bone marrow; biopsy(ies) and aspiration(s) (04/06/2021)  Drainage of Bone Marrow, Percutaneous Approach, Diagnostic (04/06/2021)  Extraction of Iliac Bone Marrow, Percutaneous Approach, Diagnostic (04/06/2021)  Bone marrow biopsy (09/08/2020)  Diagnostic bone marrow; biopsy(ies) and aspiration(s) (09/08/2020)  Drainage of Bone Marrow, Percutaneous Approach, Diagnostic (09/08/2020)  Extraction of Iliac Bone Marrow, Percutaneous Approach, Diagnostic (09/08/2020)  Eye examination (08/28/2017)  Skin biopsy (2017)  back surgery  bilatteral shoulder replacement  BL cataract  cardiac bypass  Lt shoulder  Tonsillectomy    Medications     Allegra Allergy 60 mg oral tablet, 60 mg= 1 tab(s), Oral, Daily    allopurinol 300 mg oral tablet, 300 mg= 1 tab(s), Oral, Daily, 3 refills    amlodipine  5 mg oral tablet, See Instructions, 3 refills    aspirin 81 mg oral Delayed Release (EC) tablet, 81 mg= 1 tab(s), Oral, Every other day    BD pen needles, See Instructions,   Not taking: Last Dose Date/Time Unknown    contour testing strips, See Instructions,   Not taking: Last Dose Date/Time Unknown    Farxiga 10 mg oral tablet, See Instructions, 4 refills    fluconazole 200 mg oral tablet, 400 mg= 2 tab(s), Oral, Daily, 1 refills,   Not taking: Last Dose Date/Time Unknown    fluticasone 50 mcg/inh nasal spray, See Instructions    gabapentin 300 mg oral capsule, 300 mg= 1 cap(s), Oral, TID    glipizide-metformin 5 mg-500 mg oral tablet, 2 tab(s), Oral, BID, 3 refills    hydrochlorothiazide-triamterene 25 mg-37.5 mg oral capsule, 1 cap(s), Oral, Daily, 4 refills    insulin aspart 100 units/mL injectable solution, See Instructions, 5 refills,   Not taking: Last Dose Date/Time Unknown    insulin aspart 100 units/mL injectable solution, 10 units, Subcutaneous, BID, 3 refills,   Not taking: Last Dose Date/Time Unknown    METOPROL TAR TAB 50MG, 50 mg= 1 tab(s), BID    Misc Prescription, See Instructions, 3 refills,   Not taking: Last Dose Date/Time Unknown    NovoLog FlexPen 100 units/mL subcutaneous solution, See Instructions,   Not taking: Last Dose Date/Time Unknown    pioglitazone 30 mg oral tablet, 30 mg= 1 tab(s), Oral, Daily, 3 refills    pravastatin 10 mg oral tablet, See Instructions, 3 refills    sulfamethoxazole-trimethoprim 800 mg-160 mg oral tablet, 1 tab(s), Oral, BID    tamsulosin 0.4 mg oral capsule, 0.4 mg= 1 cap(s), Oral, qPM    Vitamin B12 500 mcg oral tablet, 500 mcg= 1 tab(s), Oral, Daily    Voltaren 1% topical gel, 4 gm, TOP, QID, PRN, 3 refills,   Not taking: Last Dose Date/Time Unknown    Voltaren 1% topical gel, 1 sharon, TOP, QID, PRN, 3 refills    Allergies     No Known Allergies    Social History       Abuse/Neglect      No, No, Yes, 10/12/2021      Alcohol      Never,  05/10/2021      Employment/School      Retired, 08/31/2017      Exercise      Exercise frequency: Daily. Exercise type: Walking., 08/31/2017      Home/Environment      Lives with Spouse. Home equipment: Glucose monitoring, BP machine., 08/31/2017      Nutrition/Health      Diabetic, 08/31/2017      Sexual      Sexually active: Yes., 08/31/2017      Spiritual/Cultural      Adventism, 09/01/2021      Substance Use      Never, 02/23/2021      Tobacco      Former smoker, quit more than 30 days ago, Cigarettes, N/A, 10/12/2021    Family History     Diabetes mellitus type 2: Sister, Sister, Sister, Sister, Sister, Brother and Brother.    Heart disease: Sister and Brother.    Hypertension.: Sister, Sister, Sister, Sister, Sister, Brother and Brother.    Leukemia: Father.    Melanoma: Brother.    Immunizations       Vaccine    Date    Status    Comments      COVID-19 MRNA, LNP-S, PF- Pfizer 08/19/2021 Given    meningococcal conjugate vaccine 07/16/2021 Given    haemophilus b conjugate (PRP-T) vaccine 07/16/2021 Given    pneumococcal 23-polyvalent vaccine 07/16/2021 Given    meningococcal group B vaccine 07/16/2021 Given    COVID-19 MRNA, LNP-S, PF- Pfizer 02/03/2021 Given    COVID-19 MRNA, LNP-S, PF- Pfizer 01/13/2021 Given    influenza virus vaccine, inactivated 09/15/2020 Recorded    influenza virus vaccine, inactivated 10/23/2019 Recorded    influenza virus vaccine, inactivated 10/27/2018 Recorded    influenza virus vaccine, inactivated 10/13/2017 Recorded    influenza virus vaccine, inactivated 11/28/2016 Recorded    tetanus/diphtheria/pertussis, acel(Tdap) 09/14/2016 Recorded    influenza virus vaccine, inactivated 10/16/2015 Recorded    influenza virus vaccine, inactivated 10/16/2014 Recorded    zoster vaccine live 03/17/2014 Recorded    influenza virus vaccine, inactivated 11/19/2013 Recorded    influenza virus vaccine, inactivated 10/14/2011 Recorded 2021-05-10: UNKNOWN CAMPAIGNID   influenza virus vaccine,  inactivated 10/19/2010 Recorded                       Result type:  Surgery Office/Clinic Note     Result date:  December 07, 2021 10:00 CST     Result status:  Auth (Verified)     Result title:  Office Visit Note     Performed by:  Benji Nguyen MD on December 07, 2021 10:01 CST     Verified by:  Benji Nguyen MD on December 07, 2021 10:01 CST     Encounter info:  [id number], [facility], Clinic Visit, 12/7/2021 - 12/7/2021

## 2023-01-03 ENCOUNTER — OFFICE VISIT (OUTPATIENT)
Dept: SURGICAL ONCOLOGY | Facility: CLINIC | Age: 78
End: 2023-01-03
Payer: MEDICARE

## 2023-01-03 VITALS
HEIGHT: 74 IN | SYSTOLIC BLOOD PRESSURE: 108 MMHG | DIASTOLIC BLOOD PRESSURE: 65 MMHG | WEIGHT: 245.38 LBS | BODY MASS INDEX: 31.49 KG/M2 | HEART RATE: 77 BPM

## 2023-01-03 DIAGNOSIS — Z85.820 PERSONAL HISTORY OF MALIGNANT MELANOMA OF SKIN: Primary | ICD-10-CM

## 2023-01-03 PROCEDURE — 99213 PR OFFICE/OUTPT VISIT, EST, LEVL III, 20-29 MIN: ICD-10-PCS | Mod: S$PBB,,, | Performed by: SURGERY

## 2023-01-03 PROCEDURE — 99213 OFFICE O/P EST LOW 20 MIN: CPT | Mod: PBBFAC | Performed by: SURGERY

## 2023-01-03 PROCEDURE — 99999 PR PBB SHADOW E&M-EST. PATIENT-LVL III: ICD-10-PCS | Mod: PBBFAC,,, | Performed by: SURGERY

## 2023-01-03 PROCEDURE — 99999 PR PBB SHADOW E&M-EST. PATIENT-LVL III: CPT | Mod: PBBFAC,,, | Performed by: SURGERY

## 2023-01-03 PROCEDURE — 99213 OFFICE O/P EST LOW 20 MIN: CPT | Mod: S$PBB,,, | Performed by: SURGERY

## 2023-01-03 NOTE — LETTER
January 25, 2023    Israel Ibarra  [address] [place] [address]             [facility]  [address], [address]  [place] [address]  Phone: [phone] Dear Mr. Ibarra:    Your 1 year Follow up appointment with Dr.Jason Nguyen is 12/26/23 at 9:00am. [facility] will be calling you closer to December to set up the date for your Ultrasound.       If you have any questions or concerns, please don't hesitate to call our office at [phone]. For your Testing at [employer], [phone].    Sincerely,        CHRISTINE Rodriguez

## 2023-01-03 NOTE — PROGRESS NOTES
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  The patient underwent surveillance axillary ultrasound which shows no evidence of suspicious adenopathy.  I discussed these results with him today.  He has no specific complaints.        Past Medical and Surgical History  Allergies :   Patient has no known allergies.    @[facility]@  Medical :   He has a past medical history of Diabetes mellitus, Enlarged prostate, Gout, HLD (hyperlipidemia), [place] (hard of hearing), Hypertension, Monoclonal B-cell lymphocytosis, and Pancytopenia.    Surgical :   He has a past surgical history that includes Coronary Artery Bypass Graft (CABG); Cataract extraction, bilateral; Tonsillectomy; Shoulder surgery (Left); Back surgery; Shoulder surgery (Bilateral); Skin cancer excision; Excision basal cell carcinoma; Laparoscopic splenectomy; Bone marrow biopsy w/ aspiration (04/09/2021); and Back surgery.     Family History  His family history includes Diabetes in his brother, brother, sister, sister, sister, sister, and sister; Heart disease in his brother and sister; Hypertension in his brother, brother, sister, sister, sister, sister, and sister; Leukemia in his father; Melanoma in his brother; No Known Problems in his mother.    Social History  He reports that he has quit smoking. His smoking use included cigarettes. He has quit using smokeless tobacco. He reports that he does not drink alcohol and does not use drugs.     Review of Systems   Constitutional:  Negative for activity change, appetite change, chills, diaphoresis, fatigue and fever.   HENT:  Negative for congestion, ear pain, tinnitus and trouble swallowing.    Eyes:  Negative for photophobia and pain.   Respiratory:  Negative for apnea, cough, choking, chest tightness, shortness of breath and stridor.    Cardiovascular:  Negative for chest pain, palpitations and leg swelling.   Endocrine: Negative for cold  intolerance and heat intolerance.   Genitourinary:  Negative for difficulty urinating, dysuria, enuresis, flank pain, frequency and hematuria.   Musculoskeletal:  Negative for arthralgias, back pain and gait problem.   Neurological:  Negative for dizziness, seizures, syncope, facial asymmetry, speech difficulty, weakness, light-headedness, numbness and headaches.   Psychiatric/Behavioral:  Negative for agitation, behavioral problems, confusion and decreased concentration.    All other systems reviewed and are negative.     Objective   Physical Exam  Constitutional:       General: He is not in acute distress.     Appearance: Normal appearance. He is not ill-appearing, toxic-appearing or diaphoretic.   HENT:      Head: Normocephalic and atraumatic.      Nose: No congestion or rhinorrhea.      Mouth/Throat:      Mouth: Mucous membranes are moist.      Pharynx: Oropharynx is clear.   Eyes:      General: No scleral icterus.     Extraocular Movements: Extraocular movements intact.      Pupils: Pupils are equal, round, and reactive to light.   Cardiovascular:      Rate and Rhythm: Normal rate and regular rhythm.      Pulses: Normal pulses.      Heart sounds: Normal heart sounds. No murmur heard.    No friction rub. No gallop.   Pulmonary:      Effort: Pulmonary effort is normal. No respiratory distress.      Breath sounds: Normal breath sounds. No stridor. No wheezing or rhonchi.   Chest:      Chest wall: No tenderness.   Musculoskeletal:         General: No swelling, tenderness, deformity or signs of injury.      Cervical back: Normal range of motion and neck supple. No rigidity or tenderness.      Right lower leg: No edema.      Left lower leg: No edema.   Lymphadenopathy:      Head:      Right side of head: No submental, submandibular, tonsillar, preauricular, posterior auricular or occipital adenopathy.      Cervical: No cervical adenopathy.      Right cervical: No superficial, deep or posterior cervical adenopathy.     " Upper Body:      Right upper body: No supraclavicular, axillary, pectoral or epitrochlear adenopathy.   Skin:     General: Skin is warm.      Capillary Refill: Capillary refill takes less than 2 seconds.      Coloration: Skin is not jaundiced or pale.      Findings: No bruising, erythema, lesion or rash.      Comments: Wide excision site right upper chest wall without evidence of local recurrence   Neurological:      General: No focal deficit present.      Mental Status: He is alert and oriented to person, place, and time.   Psychiatric:         Mood and Affect: Mood normal.         Behavior: Behavior normal.         Thought Content: Thought content normal.         Judgment: Judgment normal.     VITAL SIGNS: 24 HR MIN & MAX LAST    @FLOWSTAT(6:24::1)@           @FLOWSTAT(5:24::1)@  108/65     @FLOWSTAT(8:24::1)@  77     @FLOWSTAT(9:24::1)@       @FLOWSTAT(10:24::1)@         HT: 6' 2" (188 cm)  WT: 111.3 kg (245 lb 6.4 oz)  BMI: 31.5       Assessment & Plan     Melanoma right chest wall, no evidence of recurrence   Return to clinic in 1 year with repeat right axillary ultrasound  "

## 2023-01-04 DIAGNOSIS — Z85.820 PERSONAL HISTORY OF MALIGNANT MELANOMA OF SKIN: Primary | ICD-10-CM

## 2023-01-04 DIAGNOSIS — M79.89 OTHER SPECIFIED SOFT TISSUE DISORDERS: ICD-10-CM

## 2023-02-23 ENCOUNTER — HOSPITAL ENCOUNTER (OUTPATIENT)
Dept: RADIOLOGY | Facility: HOSPITAL | Age: 78
Discharge: HOME | End: 2023-02-23
Attending: FAMILY MEDICINE
Payer: MEDICARE

## 2023-02-23 ENCOUNTER — OFFICE VISIT (OUTPATIENT)
Dept: HEMATOLOGY/ONCOLOGY | Facility: CLINIC | Age: 78
End: 2023-02-23
Payer: MEDICARE

## 2023-02-23 VITALS
HEIGHT: 74 IN | TEMPERATURE: 98 F | OXYGEN SATURATION: 95 % | HEART RATE: 91 BPM | SYSTOLIC BLOOD PRESSURE: 154 MMHG | DIASTOLIC BLOOD PRESSURE: 77 MMHG | BODY MASS INDEX: 31.85 KG/M2 | WEIGHT: 248.19 LBS

## 2023-02-23 DIAGNOSIS — D64.9 ANEMIA, UNSPECIFIED TYPE: ICD-10-CM

## 2023-02-23 DIAGNOSIS — R06.9 ABNORMAL RESPIRATORY RATE: ICD-10-CM

## 2023-02-23 DIAGNOSIS — R06.02 SHORTNESS OF BREATH: ICD-10-CM

## 2023-02-23 DIAGNOSIS — R59.0 LYMPHADENOPATHY, MESENTERIC: ICD-10-CM

## 2023-02-23 DIAGNOSIS — R06.02 SOB (SHORTNESS OF BREATH): ICD-10-CM

## 2023-02-23 DIAGNOSIS — R06.9 ABNORMAL RESPIRATORY RATE: Primary | ICD-10-CM

## 2023-02-23 DIAGNOSIS — D61.818 PANCYTOPENIA: Primary | ICD-10-CM

## 2023-02-23 PROCEDURE — 99999 PR PBB SHADOW E&M-EST. PATIENT-LVL V: CPT | Mod: PBBFAC,,, | Performed by: NURSE PRACTITIONER

## 2023-02-23 PROCEDURE — 99999 PR PBB SHADOW E&M-EST. PATIENT-LVL V: ICD-10-PCS | Mod: PBBFAC,,, | Performed by: NURSE PRACTITIONER

## 2023-02-23 PROCEDURE — 99214 PR OFFICE/OUTPT VISIT, EST, LEVL IV, 30-39 MIN: ICD-10-PCS | Mod: S$PBB,,, | Performed by: NURSE PRACTITIONER

## 2023-02-23 PROCEDURE — 99214 OFFICE O/P EST MOD 30 MIN: CPT | Mod: S$PBB,,, | Performed by: NURSE PRACTITIONER

## 2023-02-23 PROCEDURE — 99215 OFFICE O/P EST HI 40 MIN: CPT | Mod: PBBFAC,25 | Performed by: NURSE PRACTITIONER

## 2023-02-23 PROCEDURE — 71046 X-RAY EXAM CHEST 2 VIEWS: CPT | Mod: TC

## 2023-02-23 RX ORDER — FUROSEMIDE 40 MG/1
40 TABLET ORAL DAILY PRN
COMMUNITY
Start: 2022-11-16 | End: 2023-06-05

## 2023-02-23 RX ORDER — DICLOFENAC SODIUM 10 MG/G
2 GEL TOPICAL DAILY PRN
COMMUNITY

## 2023-02-23 RX ORDER — IRON,CARBONYL/ASCORBIC ACID 100-250 MG
1 TABLET ORAL EVERY MORNING
COMMUNITY
Start: 2023-02-10

## 2023-02-23 RX ORDER — LISINOPRIL 5 MG/1
5 TABLET ORAL DAILY
COMMUNITY
Start: 2023-02-10 | End: 2024-01-03

## 2023-02-23 NOTE — PROGRESS NOTES
Subjective:       Patient ID: Israel Ibarra is a 77 y.o. male.    Chief Complaint: Follow-up (No new issues)        Diagnosis:  1.  Pancytopenia with macrocytosis  2.  Vitamin B12 deficiency  3.  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  Repeat bone marrow biopsy on 4/6/2021 showed that the small kappa restricted B-cell population now accounts for less than 1% of the viable lymphocytes.    Current Treatment:   1.  B12 supplementation.       Treatment History:  1.  Splenectomy    HPI:  Patient who was seen by Dr. Neal at [facility] for pancytopenia on 8/19/2020.  It was noted that the patient had leukopenia dating back to 2014.  He then began to have neutropenia and anemia in January 2020 and pancytopenia began in July 2020.  Peripheral blood smear on 7/8/2020 showed moderate macrocytic normochromic anemia, moderate leukopenia with absolute neutropenia with a few reactive lymphocytes.  There was mild thrombocytopenia with normal platelet morphology.  The patient then underwent a bone marrow biopsy on 9/8/2020, this showed a hypercellular bone marrow for age (50 to 60%) with trilineage hematopoiesis, lymphoid cells normal in number with few small atypical forms, flow cytometry revealed a small 11% monoclonal B-cell population, indeterminate for malignancy.  FISH was normal, cytogenetics showed 46,Y,inv(X)(p22.1q24)?c[20]. Further work-up was unrevealing occluding HIV negative, hepatitis A IgM negative, hepatitis B core IgM negative, hepatitis B surface antigen negative, hepatitis C antibody negative.  He was however found to be B12 deficient and started on B12 supplementation.  He continued to follow with Dr. Shin, most recently seen on 2/23/2021 via telemedicine.  Due to persistent pancytopenia with macrocytosis without improvement after B12 supplementation, it was recommended that he undergo repeat bone marrow biopsy and aspiration.  He then  saw me initially on 3/8/2021.  At that time, he had no major complaints other than easy bruising.    Repeat bone marrow biopsy on 4/6/2021 revealed normocellular bone marrow with trilineage hematopoiesis and maturation, no increase in lymphocytes, no dysplasia identified, small kappa restricted B-cell population now accounting for less than 1% of viable lymphocytes.  He did have an admission to the hospital on 4/15/2021 for neutropenic fever, no organism identified.  CT scan of the neck, chest, abdomen, and pelvis on 6/14/2021 showed no lymph nodes that are enlarged by size criteria in the neck, chest, abdomen, or pelvis.  There is splenomegaly with the spleen measuring 14.1 x 8.4 cm.  There are a few subcentimeter right sided lung nodules that are nonspecific, but most likely related to either an infectious or inflammatory process.  There was enlarged somewhat irregular prostate extending exophytically into the posterior aspect of the urinary bladder, prostatic malignancy is not excluded.  There is diffuse wall thickening of the urinary bladder that may be related to chronic outlet obstruction or cystitis.  Case discussed at [facility] tumor board on 6/25/2021, decision made to move forward with splenectomy.  Patient underwent planned therapeutic splenectomy on 9/15/2021, tolerated well. Pathology negative for malignancy.  Patient underwent a CT scan of the chest, abdomen, and pelvis on 07/01/2022 that revealed small lymph nodes in the small bowel mesentery that have increased in number and slightly enlarged.  Otherwise, no evidence of other pathology. CT scan of the chest, abdomen, and pelvis done on 10/27/2022 showed stable prominent mesenteric lymph nodes with no other abnormalities.    Interval History:   Patient presents to clinic for scheduled follow up.  He does report fatigue, shortness of breath, lower extremity swelling.  He had a recent admission at the [facility] and was transfused 1 unit of blood.  He  has upcoming testing on his lungs in his being followed closely by Cardiology.  He denies any bleeding.      Past Medical History:   Diagnosis Date    Diabetes mellitus     Enlarged prostate     Gout     HLD (hyperlipidemia)     [place] (hard of hearing)     Hypertension     Monoclonal B-cell lymphocytosis     Pancytopenia       Past Surgical History:   Procedure Laterality Date    BACK SURGERY      BACK SURGERY      BASAL CELL CARCINOMA EXCISION      head    BONE MARROW BIOPSY W/ ASPIRATION  04/09/2021    CATARACT EXTRACTION, BILATERAL      CORONARY ARTERY BYPASS GRAFT (CABG)      LAPAROSCOPIC SPLENECTOMY      SHOULDER SURGERY Left     SHOULDER SURGERY Bilateral     SKIN CANCER EXCISION      on face    TONSILLECTOMY       Social History     Socioeconomic History    Marital status:    Tobacco Use    Smoking status: Former     Types: Cigarettes    Smokeless tobacco: Former    Tobacco comments:     patient stated that he quit at age 30   Substance and Sexual Activity    Alcohol use: Never    Drug use: Never      Family History   Problem Relation Age of Onset    No Known Problems Mother     Leukemia Father     Diabetes Sister     Hypertension Sister     Heart disease Sister     Diabetes Brother     Hypertension Brother     Melanoma Brother     Diabetes Sister     Hypertension Sister     Diabetes Sister     Hypertension Sister     Diabetes Sister     Hypertension Sister     Diabetes Sister     Hypertension Sister     Diabetes Brother     Hypertension Brother     Heart disease Brother       Review of patient's allergies indicates:  No Known Allergies   Review of Systems   Constitutional:  Positive for fatigue. Negative for appetite change, fever and unexpected weight change.   HENT:  Negative for nasal congestion, nosebleeds, sinus pressure/congestion and sore throat.    Eyes:  Negative for visual disturbance and eye dryness.   Respiratory:  Positive for shortness of breath. Negative for chest tightness.     Cardiovascular:  Positive for leg swelling. Negative for chest pain.   Gastrointestinal:  Negative for abdominal pain and blood in stool.   Genitourinary:  Negative for difficulty urinating, frequency and hematuria.   Musculoskeletal:  Positive for back pain and leg pain.   Neurological:  Negative for dizziness, syncope and numbness.   Hematological:  Negative for adenopathy.       Objective:      Physical Exam  Vitals reviewed.   Constitutional:       General: He is awake.      Appearance: Normal appearance.   HENT:      Head: Normocephalic and atraumatic.      Right Ear: Hearing normal.      Left Ear: Hearing normal.      Nose: Nose normal.   Eyes:      General: Lids are normal. Vision grossly intact.      Extraocular Movements: Extraocular movements intact.      Conjunctiva/sclera: Conjunctivae normal.   Cardiovascular:      Rate and Rhythm: Normal rate and regular rhythm.      Pulses: Normal pulses.      Heart sounds: Normal heart sounds.   Pulmonary:      Effort: Pulmonary effort is normal.      Breath sounds: Normal breath sounds. No wheezing, rhonchi or rales.   Abdominal:      General: Bowel sounds are normal.      Palpations: Abdomen is soft.      Tenderness: There is no abdominal tenderness.   Musculoskeletal:      Cervical back: Full passive range of motion without pain.      Right lower leg: No edema.      Left lower leg: No edema.   Lymphadenopathy:      Cervical: No cervical adenopathy.      Upper Body:      Right upper body: No supraclavicular or axillary adenopathy.      Left upper body: No supraclavicular or axillary adenopathy.   Skin:     General: Skin is warm.   Neurological:      General: No focal deficit present.      Mental Status: He is alert and oriented to person, place, and time.   Psychiatric:         Attention and Perception: Attention normal.         Mood and Affect: Mood and affect normal.         Behavior: Behavior is cooperative.       LABS AND IMAGING REVIEWED IN EPIC           Assessment:     1.  Pancytopenia with macrocytosis  2.  Vitamin B12 deficiency  3.  Monoclonal B-cell population on flow cytometry from bone marrow biopsy from September 2020, this involves 11% of the bone marrow, repeat bone marrow showed less than 1% involvement  4. Abnormal weight loss  5. Constipation and bowel habit irregularities         Plan:       Patient is status post splenectomy for splenomegaly.  Pathology negative for malignancy.  He does have this history of monoclonal B-cell population on bone marrow biopsy.    Small bowel mesentery lymph nodes are stable.  Repeat scans 5/2023.    Patient does have an elevated MCV and anemia, I think his anemia is from chronic disease in his elevated MCV is secondary to having a splenectomy.     His anemia has worsened over the past several months and he is having shortness of breath.  We will recheck B12, folic acid, iron on today's labs.  His cardiologist did start him on p.o. iron recently and he has recently started taking B12 by mouth.    CBC in 2 weeks, f/u in 6 weeks    Patient knows to come in sooner if any new signs or symptoms occur.      Louisa Guerrier, MICHAELP-C

## 2023-03-10 ENCOUNTER — LAB VISIT (OUTPATIENT)
Dept: LAB | Facility: HOSPITAL | Age: 78
End: 2023-03-10
Payer: MEDICARE

## 2023-03-10 DIAGNOSIS — D61.818 PANCYTOPENIA: ICD-10-CM

## 2023-03-10 DIAGNOSIS — D64.9 ANEMIA, UNSPECIFIED TYPE: ICD-10-CM

## 2023-03-10 DIAGNOSIS — R59.0 LYMPHADENOPATHY, MESENTERIC: ICD-10-CM

## 2023-03-10 DIAGNOSIS — R06.02 SOB (SHORTNESS OF BREATH): ICD-10-CM

## 2023-03-10 LAB
BASOPHILS # BLD AUTO: 0.02 X10(3)/MCL (ref 0–0.2)
BASOPHILS NFR BLD AUTO: 0.4 %
EOSINOPHIL # BLD AUTO: 0 X10(3)/MCL (ref 0–0.9)
EOSINOPHIL NFR BLD AUTO: 0 %
ERYTHROCYTE [DISTWIDTH] IN BLOOD BY AUTOMATED COUNT: 24.4 % (ref 11.5–17)
HCT VFR BLD AUTO: 31.4 % (ref 42–52)
HGB BLD-MCNC: 9.4 G/DL (ref 14–18)
IMM GRANULOCYTES # BLD AUTO: 0.46 X10(3)/MCL (ref 0–0.04)
IMM GRANULOCYTES NFR BLD AUTO: 8.5 %
LYMPHOCYTES # BLD AUTO: 2.97 X10(3)/MCL (ref 0.6–4.6)
LYMPHOCYTES NFR BLD AUTO: 54.6 %
MCH RBC QN AUTO: 33.9 PG
MCHC RBC AUTO-ENTMCNC: 29.9 G/DL (ref 33–36)
MCV RBC AUTO: 113.4 FL (ref 80–94)
MONOCYTES # BLD AUTO: 0.48 X10(3)/MCL (ref 0.1–1.3)
MONOCYTES NFR BLD AUTO: 8.8 %
NEUTROPHILS # BLD AUTO: 1.51 X10(3)/MCL (ref 2.1–9.2)
NEUTROPHILS NFR BLD AUTO: 27.7 %
PLATELET # BLD AUTO: 132 X10(3)/MCL (ref 130–400)
PMV BLD AUTO: 11.6 FL (ref 7.4–10.4)
RBC # BLD AUTO: 2.77 X10(6)/MCL (ref 4.7–6.1)
WBC # SPEC AUTO: 5.4 X10(3)/MCL (ref 4.5–11.5)

## 2023-03-10 PROCEDURE — 85025 COMPLETE CBC W/AUTO DIFF WBC: CPT

## 2023-03-10 PROCEDURE — 36415 COLL VENOUS BLD VENIPUNCTURE: CPT

## 2023-04-06 ENCOUNTER — HOSPITAL ENCOUNTER (OUTPATIENT)
Dept: RADIOLOGY | Facility: HOSPITAL | Age: 78
Discharge: HOME | End: 2023-04-06
Attending: NURSE PRACTITIONER
Payer: MEDICARE

## 2023-04-06 DIAGNOSIS — D61.818 PANCYTOPENIA: ICD-10-CM

## 2023-04-06 DIAGNOSIS — R59.0 LYMPHADENOPATHY, MESENTERIC: ICD-10-CM

## 2023-04-06 DIAGNOSIS — D64.9 ANEMIA, UNSPECIFIED TYPE: ICD-10-CM

## 2023-04-06 LAB
CREAT SERPL-MCNC: 0.9 MG/DL (ref 0.5–1.4)
SAMPLE: NORMAL

## 2023-04-06 PROCEDURE — 25500020 PHARM REV CODE 255: Performed by: NURSE PRACTITIONER

## 2023-04-06 PROCEDURE — 71260 CT THORAX DX C+: CPT | Mod: TC

## 2023-04-06 PROCEDURE — 74177 CT ABD & PELVIS W/CONTRAST: CPT | Mod: TC

## 2023-04-06 RX ADMIN — IOPAMIDOL 100 ML: 755 INJECTION, SOLUTION INTRAVENOUS at 08:04

## 2023-04-06 RX ADMIN — DIATRIZOATE MEGLUMINE AND DIATRIZOATE SODIUM 30 ML: 660; 100 LIQUID ORAL; RECTAL at 08:04

## 2023-04-06 NOTE — PROGRESS NOTES
Subjective:       Patient ID: Israel Ibarra is a 77 y.o. male.    Chief Complaint: No chief complaint on file.        Diagnosis:  1.  Pancytopenia with macrocytosis  2.  Vitamin B12 deficiency  3.  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  Repeat bone marrow biopsy on 4/6/2021 showed that the small kappa restricted B-cell population now accounts for less than 1% of the viable lymphocytes.    Current Treatment:   1.  B12 supplementation.       Treatment History:  1.  Splenectomy    HPI:  Patient who was seen by Dr. Neal at [facility] for pancytopenia on 8/19/2020.  It was noted that the patient had leukopenia dating back to 2014.  He then began to have neutropenia and anemia in January 2020 and pancytopenia began in July 2020.  Peripheral blood smear on 7/8/2020 showed moderate macrocytic normochromic anemia, moderate leukopenia with absolute neutropenia with a few reactive lymphocytes.  There was mild thrombocytopenia with normal platelet morphology.  The patient then underwent a bone marrow biopsy on 9/8/2020, this showed a hypercellular bone marrow for age (50 to 60%) with trilineage hematopoiesis, lymphoid cells normal in number with few small atypical forms, flow cytometry revealed a small 11% monoclonal B-cell population, indeterminate for malignancy.  FISH was normal, cytogenetics showed 46,Y,inv(X)(p22.1q24)?c[20]. Further work-up was unrevealing occluding HIV negative, hepatitis A IgM negative, hepatitis B core IgM negative, hepatitis B surface antigen negative, hepatitis C antibody negative.  He was however found to be B12 deficient and started on B12 supplementation.  He continued to follow with Dr. Shin, most recently seen on 2/23/2021 via telemedicine.  Due to persistent pancytopenia with macrocytosis without improvement after B12 supplementation, it was recommended that he undergo repeat bone marrow biopsy and aspiration.  He then  saw me initially on 3/8/2021.  At that time, he had no major complaints other than easy bruising.    Repeat bone marrow biopsy on 4/6/2021 revealed normocellular bone marrow with trilineage hematopoiesis and maturation, no increase in lymphocytes, no dysplasia identified, small kappa restricted B-cell population now accounting for less than 1% of viable lymphocytes.  He did have an admission to the hospital on 4/15/2021 for neutropenic fever, no organism identified.  CT scan of the neck, chest, abdomen, and pelvis on 6/14/2021 showed no lymph nodes that are enlarged by size criteria in the neck, chest, abdomen, or pelvis.  There is splenomegaly with the spleen measuring 14.1 x 8.4 cm.  There are a few subcentimeter right sided lung nodules that are nonspecific, but most likely related to either an infectious or inflammatory process.  There was enlarged somewhat irregular prostate extending exophytically into the posterior aspect of the urinary bladder, prostatic malignancy is not excluded.  There is diffuse wall thickening of the urinary bladder that may be related to chronic outlet obstruction or cystitis.  Case discussed at [facility] tumor board on 6/25/2021, decision made to move forward with splenectomy.  Patient underwent planned therapeutic splenectomy on 9/15/2021, tolerated well. Pathology negative for malignancy.  Patient underwent a CT scan of the chest, abdomen, and pelvis on 07/01/2022 that revealed small lymph nodes in the small bowel mesentery that have increased in number and slightly enlarged.  Otherwise, no evidence of other pathology. CT scan of the chest, abdomen, and pelvis done on 10/27/2022 showed stable prominent mesenteric lymph nodes with no other abnormalities.  A repeat CT scan of the chest, abdomen, and pelvis on 04/06/2023 revealed unchanged appearance of prominent but not pathologically enlarged mesenteric lymph nodes with new small bilateral pleural effusions, mesenteric edema, and  anasarca consistent with fluid overload.  There was some gallbladder wall edema without gallbladder distention could be secondary to fluid status but acute cholecystitis could not be entirely excluded.    Interval History:   Patient presents to clinic for scheduled follow up.  He does report fatigue, shortness of breath, lower extremity swelling.  He had a recent admission at the [facility] and was transfused 1 unit of blood.  He follows with Cardiology.  He would like to see a pulmonologist.      Past Medical History:   Diagnosis Date    Diabetes mellitus     Enlarged prostate     Gout     HLD (hyperlipidemia)     [place] (hard of hearing)     Hypertension     Monoclonal B-cell lymphocytosis     Pancytopenia       Past Surgical History:   Procedure Laterality Date    BACK SURGERY      BACK SURGERY      BASAL CELL CARCINOMA EXCISION      head    BONE MARROW BIOPSY W/ ASPIRATION  04/09/2021    CATARACT EXTRACTION, BILATERAL      CORONARY ARTERY BYPASS GRAFT (CABG)      LAPAROSCOPIC SPLENECTOMY      SHOULDER SURGERY Left     SHOULDER SURGERY Bilateral     SKIN CANCER EXCISION      on face    TONSILLECTOMY       Social History     Socioeconomic History    Marital status:    Tobacco Use    Smoking status: Former     Types: Cigarettes    Smokeless tobacco: Former    Tobacco comments:     patient stated that he quit at age 30   Substance and Sexual Activity    Alcohol use: Never    Drug use: Never      Family History   Problem Relation Age of Onset    No Known Problems Mother     Leukemia Father     Diabetes Sister     Hypertension Sister     Heart disease Sister     Diabetes Brother     Hypertension Brother     Melanoma Brother     Diabetes Sister     Hypertension Sister     Diabetes Sister     Hypertension Sister     Diabetes Sister     Hypertension Sister     Diabetes Sister     Hypertension Sister     Diabetes Brother     Hypertension Brother     Heart disease Brother       Review of patient's allergies  indicates:  No Known Allergies   Review of Systems   Constitutional:  Positive for fatigue. Negative for appetite change, fever and unexpected weight change.   HENT:  Negative for nasal congestion, nosebleeds, sinus pressure/congestion and sore throat.    Eyes:  Negative for visual disturbance and eye dryness.   Respiratory:  Positive for shortness of breath. Negative for chest tightness.    Cardiovascular:  Positive for leg swelling. Negative for chest pain.   Gastrointestinal:  Negative for abdominal pain and blood in stool.   Genitourinary:  Negative for difficulty urinating, frequency and hematuria.   Musculoskeletal:  Positive for back pain and leg pain.   Neurological:  Negative for dizziness, syncope and numbness.   Hematological:  Negative for adenopathy.       Objective:      Physical Exam  Vitals reviewed.   Constitutional:       General: He is awake.      Appearance: Normal appearance.   HENT:      Head: Normocephalic and atraumatic.      Right Ear: Hearing normal.      Left Ear: Hearing normal.      Nose: Nose normal.   Eyes:      General: Lids are normal. Vision grossly intact.      Extraocular Movements: Extraocular movements intact.      Conjunctiva/sclera: Conjunctivae normal.   Cardiovascular:      Rate and Rhythm: Normal rate and regular rhythm.      Pulses: Normal pulses.      Heart sounds: Normal heart sounds.   Pulmonary:      Effort: Pulmonary effort is normal.      Breath sounds: No wheezing, rhonchi or rales.      Comments: Decreased breath sounds at the bilateral bases  Abdominal:      General: Bowel sounds are normal.      Palpations: Abdomen is soft.      Tenderness: There is no abdominal tenderness.   Musculoskeletal:      Cervical back: Full passive range of motion without pain.      Right lower leg: No edema.      Left lower leg: No edema.   Lymphadenopathy:      Cervical: No cervical adenopathy.      Upper Body:      Right upper body: No supraclavicular or axillary adenopathy.       Left upper body: No supraclavicular or axillary adenopathy.   Skin:     General: Skin is warm.   Neurological:      General: No focal deficit present.      Mental Status: He is alert and oriented to person, place, and time.   Psychiatric:         Attention and Perception: Attention normal.         Mood and Affect: Mood and affect normal.         Behavior: Behavior is cooperative.       LABS AND IMAGING REVIEWED IN EPIC          Assessment:     1.  Pancytopenia with macrocytosis  2.  Vitamin B12 deficiency  3.  Monoclonal B-cell population on flow cytometry from bone marrow biopsy from September 2020, this involves 11% of the bone marrow, repeat bone marrow showed less than 1% involvement  4. Abnormal weight loss  5. Constipation and bowel habit irregularities         Plan:       Patient is status post splenectomy for splenomegaly.  Pathology negative for malignancy.  He does have this history of monoclonal B-cell population on bone marrow biopsy.    Small bowel mesentery lymph nodes are stable.  Repeat scans 5/2023.    Patient does have an elevated MCV and anemia, I think his anemia is from chronic disease in his elevated MCV is secondary to having a splenectomy.     Will send referral to Pulmonology for new small bilateral pleural effusions    Will set patient up for thoracentesis     Return to clinic in 8 weeks with labs    Patient knows to come in sooner if any new signs or symptoms occur.      Telly Mendoza II, MD

## 2023-04-10 ENCOUNTER — OFFICE VISIT (OUTPATIENT)
Dept: HEMATOLOGY/ONCOLOGY | Facility: CLINIC | Age: 78
End: 2023-04-10
Payer: MEDICARE

## 2023-04-10 VITALS
HEART RATE: 69 BPM | HEIGHT: 74 IN | DIASTOLIC BLOOD PRESSURE: 77 MMHG | TEMPERATURE: 98 F | WEIGHT: 238.5 LBS | OXYGEN SATURATION: 97 % | BODY MASS INDEX: 30.61 KG/M2 | RESPIRATION RATE: 14 BRPM | SYSTOLIC BLOOD PRESSURE: 130 MMHG

## 2023-04-10 DIAGNOSIS — J90 BILATERAL PLEURAL EFFUSION: ICD-10-CM

## 2023-04-10 DIAGNOSIS — D64.9 ANEMIA, UNSPECIFIED TYPE: ICD-10-CM

## 2023-04-10 DIAGNOSIS — D61.818 PANCYTOPENIA: ICD-10-CM

## 2023-04-10 DIAGNOSIS — R06.02 SOB (SHORTNESS OF BREATH): Primary | ICD-10-CM

## 2023-04-10 PROCEDURE — 99999 PR PBB SHADOW E&M-EST. PATIENT-LVL V: CPT | Mod: PBBFAC,,, | Performed by: INTERNAL MEDICINE

## 2023-04-10 PROCEDURE — 99999 PR PBB SHADOW E&M-EST. PATIENT-LVL V: ICD-10-PCS | Mod: PBBFAC,,, | Performed by: INTERNAL MEDICINE

## 2023-04-10 PROCEDURE — 99215 OFFICE O/P EST HI 40 MIN: CPT | Mod: PBBFAC | Performed by: INTERNAL MEDICINE

## 2023-04-10 PROCEDURE — 99214 OFFICE O/P EST MOD 30 MIN: CPT | Mod: S$PBB,,, | Performed by: INTERNAL MEDICINE

## 2023-04-10 PROCEDURE — 99214 PR OFFICE/OUTPT VISIT, EST, LEVL IV, 30-39 MIN: ICD-10-PCS | Mod: S$PBB,,, | Performed by: INTERNAL MEDICINE

## 2023-04-12 ENCOUNTER — HOSPITAL ENCOUNTER (OUTPATIENT)
Dept: RADIOLOGY | Facility: HOSPITAL | Age: 78
Discharge: HOME | End: 2023-04-12
Attending: RADIOLOGY
Payer: MEDICARE

## 2023-04-12 ENCOUNTER — HOSPITAL ENCOUNTER (OUTPATIENT)
Dept: RADIOLOGY | Facility: HOSPITAL | Age: 78
Discharge: HOME | End: 2023-04-12
Attending: INTERNAL MEDICINE
Payer: MEDICARE

## 2023-04-12 DIAGNOSIS — J90 BILATERAL PLEURAL EFFUSION: ICD-10-CM

## 2023-04-12 DIAGNOSIS — R06.02 SOB (SHORTNESS OF BREATH): ICD-10-CM

## 2023-04-12 DIAGNOSIS — J90 PLEURAL EFFUSION: ICD-10-CM

## 2023-04-12 PROCEDURE — 88305 TISSUE EXAM BY PATHOLOGIST: CPT

## 2023-04-12 PROCEDURE — 32555 ASPIRATE PLEURA W/ IMAGING: CPT

## 2023-04-12 PROCEDURE — 88112 CYTOPATH CELL ENHANCE TECH: CPT | Performed by: INTERNAL MEDICINE

## 2023-04-12 PROCEDURE — 71045 X-RAY EXAM CHEST 1 VIEW: CPT | Mod: TC

## 2023-04-12 RX ORDER — LIDOCAINE HYDROCHLORIDE 20 MG/ML
INJECTION, SOLUTION EPIDURAL; INFILTRATION; INTRACAUDAL; PERINEURAL
Status: DISCONTINUED
Start: 2023-04-12 | End: 2023-04-13 | Stop reason: HOSPADM

## 2023-04-13 ENCOUNTER — TELEPHONE (OUTPATIENT)
Dept: HEMATOLOGY/ONCOLOGY | Facility: CLINIC | Age: 78
End: 2023-04-13
Payer: MEDICARE

## 2023-04-13 DIAGNOSIS — J90 BILATERAL PLEURAL EFFUSION: ICD-10-CM

## 2023-04-13 DIAGNOSIS — R06.02 SOB (SHORTNESS OF BREATH): Primary | ICD-10-CM

## 2023-04-13 LAB — PSYCHE PATHOLOGY RESULT: NORMAL

## 2023-04-14 ENCOUNTER — HOSPITAL ENCOUNTER (OUTPATIENT)
Dept: RADIOLOGY | Facility: HOSPITAL | Age: 78
Discharge: HOME | End: 2023-04-14
Attending: RADIOLOGY
Payer: MEDICARE

## 2023-04-14 ENCOUNTER — HOSPITAL ENCOUNTER (OUTPATIENT)
Dept: RADIOLOGY | Facility: HOSPITAL | Age: 78
Discharge: HOME | End: 2023-04-14
Attending: INTERNAL MEDICINE
Payer: MEDICARE

## 2023-04-14 DIAGNOSIS — R06.02 SOB (SHORTNESS OF BREATH): ICD-10-CM

## 2023-04-14 DIAGNOSIS — J90 BILATERAL PLEURAL EFFUSION: ICD-10-CM

## 2023-04-14 PROCEDURE — 88112 CYTOPATH CELL ENHANCE TECH: CPT | Performed by: INTERNAL MEDICINE

## 2023-04-14 PROCEDURE — 88305 TISSUE EXAM BY PATHOLOGIST: CPT

## 2023-04-14 PROCEDURE — 71045 X-RAY EXAM CHEST 1 VIEW: CPT | Mod: TC

## 2023-04-14 PROCEDURE — 32555 ASPIRATE PLEURA W/ IMAGING: CPT

## 2023-04-17 LAB — PSYCHE PATHOLOGY RESULT: NORMAL

## 2023-06-01 NOTE — PROGRESS NOTES
Subjective:       Patient ID: Israel Ibarra is a 77 y.o. male.    Chief Complaint: Other Misc (Pt reports no new concerns today.)        Diagnosis:  1.  Pancytopenia with macrocytosis  2.  Vitamin B12 deficiency  3.  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  Repeat bone marrow biopsy on 4/6/2021 showed that the small kappa restricted B-cell population now accounts for less than 1% of the viable lymphocytes.    Current Treatment:   1.  B12 supplementation.       Treatment History:  1.  Splenectomy    HPI:  Patient who was seen by Dr. Neal at [facility] for pancytopenia on 8/19/2020.  It was noted that the patient had leukopenia dating back to 2014.  He then began to have neutropenia and anemia in January 2020 and pancytopenia began in July 2020.  Peripheral blood smear on 7/8/2020 showed moderate macrocytic normochromic anemia, moderate leukopenia with absolute neutropenia with a few reactive lymphocytes.  There was mild thrombocytopenia with normal platelet morphology.  The patient then underwent a bone marrow biopsy on 9/8/2020, this showed a hypercellular bone marrow for age (50 to 60%) with trilineage hematopoiesis, lymphoid cells normal in number with few small atypical forms, flow cytometry revealed a small 11% monoclonal B-cell population, indeterminate for malignancy.  FISH was normal, cytogenetics showed 46,Y,inv(X)(p22.1q24)?c[20]. Further work-up was unrevealing occluding HIV negative, hepatitis A IgM negative, hepatitis B core IgM negative, hepatitis B surface antigen negative, hepatitis C antibody negative.  He was however found to be B12 deficient and started on B12 supplementation.  He continued to follow with Dr. Shin, most recently seen on 2/23/2021 via telemedicine.  Due to persistent pancytopenia with macrocytosis without improvement after B12 supplementation, it was recommended that he undergo repeat bone marrow biopsy and  aspiration.  He then saw me initially on 3/8/2021.  At that time, he had no major complaints other than easy bruising.    Repeat bone marrow biopsy on 4/6/2021 revealed normocellular bone marrow with trilineage hematopoiesis and maturation, no increase in lymphocytes, no dysplasia identified, small kappa restricted B-cell population now accounting for less than 1% of viable lymphocytes.  He did have an admission to the hospital on 4/15/2021 for neutropenic fever, no organism identified.  CT scan of the neck, chest, abdomen, and pelvis on 6/14/2021 showed no lymph nodes that are enlarged by size criteria in the neck, chest, abdomen, or pelvis.  There is splenomegaly with the spleen measuring 14.1 x 8.4 cm.  There are a few subcentimeter right sided lung nodules that are nonspecific, but most likely related to either an infectious or inflammatory process.  There was enlarged somewhat irregular prostate extending exophytically into the posterior aspect of the urinary bladder, prostatic malignancy is not excluded.  There is diffuse wall thickening of the urinary bladder that may be related to chronic outlet obstruction or cystitis.  Case discussed at [facility] tumor board on 6/25/2021, decision made to move forward with splenectomy.  Patient underwent planned therapeutic splenectomy on 9/15/2021, tolerated well. Pathology negative for malignancy.  Patient underwent a CT scan of the chest, abdomen, and pelvis on 07/01/2022 that revealed small lymph nodes in the small bowel mesentery that have increased in number and slightly enlarged.  Otherwise, no evidence of other pathology. CT scan of the chest, abdomen, and pelvis done on 10/27/2022 showed stable prominent mesenteric lymph nodes with no other abnormalities.  A repeat CT scan of the chest, abdomen, and pelvis on 04/06/2023 revealed unchanged appearance of prominent but not pathologically enlarged mesenteric lymph nodes with new small bilateral pleural effusions,  mesenteric edema, and anasarca consistent with fluid overload.  There was some gallbladder wall edema without gallbladder distention could be secondary to fluid status but acute cholecystitis could not be entirely excluded.    Interval History:   Patient presents to clinic for scheduled follow up, wife present.  Overall, he is doing okay.  He is scheduled for back surgery on 2023. He was recently seen by his pulmonologist who also recommended to hold off on further thoracentesis.         Past Medical History:   Diagnosis Date    Diabetes mellitus     Enlarged prostate     Gout     HLD (hyperlipidemia)     [place] (hard of hearing)     Hypertension     Monoclonal B-cell lymphocytosis     Pancytopenia       Past Surgical History:   Procedure Laterality Date    BACK SURGERY      BACK SURGERY      BASAL CELL CARCINOMA EXCISION      head    BONE MARROW BIOPSY W/ ASPIRATION  2021    CATARACT EXTRACTION, BILATERAL      CORONARY ARTERY BYPASS GRAFT (CABG)      LAPAROSCOPIC SPLENECTOMY      SHOULDER SURGERY Left     SHOULDER SURGERY Bilateral     SKIN CANCER EXCISION      on face    TONSILLECTOMY       Social History     Socioeconomic History    Marital status:    Tobacco Use    Smoking status: Former     Packs/day: 1.25     Years: 12.00     Pack years: 15.00     Types: Cigarettes     Quit date:      Years since quittin.4    Smokeless tobacco: Former    Tobacco comments:     patient stated that he quit at age 30   Substance and Sexual Activity    Alcohol use: Never    Drug use: Never      Family History   Problem Relation Age of Onset    No Known Problems Mother     Leukemia Father     Diabetes Sister     Hypertension Sister     Heart disease Sister     Diabetes Brother     Hypertension Brother     Melanoma Brother     Diabetes Sister     Hypertension Sister     Diabetes Sister     Hypertension Sister     Diabetes Sister     Hypertension Sister     Diabetes Sister     Hypertension Sister     Diabetes  Brother     Hypertension Brother     Heart disease Brother       Review of patient's allergies indicates:  No Known Allergies   Review of Systems   Constitutional:  Positive for fatigue. Negative for appetite change, fever and unexpected weight change.   HENT:  Negative for nasal congestion, nosebleeds, sinus pressure/congestion and sore throat.    Eyes:  Negative for visual disturbance and eye dryness.   Respiratory:  Positive for shortness of breath. Negative for chest tightness.    Cardiovascular:  Positive for leg swelling. Negative for chest pain.   Gastrointestinal:  Negative for abdominal pain and blood in stool.   Genitourinary:  Negative for difficulty urinating, frequency and hematuria.   Musculoskeletal:  Positive for back pain and leg pain.   Neurological:  Negative for dizziness, syncope and numbness.   Hematological:  Negative for adenopathy.       Objective:      Physical Exam  Vitals reviewed.   Constitutional:       General: He is awake.      Appearance: Normal appearance.   HENT:      Head: Normocephalic and atraumatic.      Right Ear: Hearing normal.      Left Ear: Hearing normal.      Nose: Nose normal.   Eyes:      General: Lids are normal. Vision grossly intact.      Extraocular Movements: Extraocular movements intact.      Conjunctiva/sclera: Conjunctivae normal.   Cardiovascular:      Rate and Rhythm: Normal rate and regular rhythm.      Pulses: Normal pulses.      Heart sounds: Normal heart sounds.   Pulmonary:      Effort: Pulmonary effort is normal.      Breath sounds: No wheezing, rhonchi or rales.      Comments: Decreased breath sounds at the bilateral bases  Abdominal:      General: Bowel sounds are normal.      Palpations: Abdomen is soft.      Tenderness: There is no abdominal tenderness.   Musculoskeletal:      Cervical back: Full passive range of motion without pain.      Right lower leg: No edema.      Left lower leg: No edema.   Lymphadenopathy:      Cervical: No cervical  adenopathy.      Upper Body:      Right upper body: No supraclavicular or axillary adenopathy.      Left upper body: No supraclavicular or axillary adenopathy.   Skin:     General: Skin is warm.   Neurological:      General: No focal deficit present.      Mental Status: He is alert and oriented to person, place, and time.   Psychiatric:         Attention and Perception: Attention normal.         Mood and Affect: Mood and affect normal.         Behavior: Behavior is cooperative.       LABS AND IMAGING REVIEWED IN EPIC          Assessment:     1.  Pancytopenia with macrocytosis  2.  Vitamin B12 deficiency  3.  Monoclonal B-cell population on flow cytometry from bone marrow biopsy from September 2020, this involves 11% of the bone marrow, repeat bone marrow showed less than 1% involvement  4. Abnormal weight loss  5. Constipation and bowel habit irregularities         Plan:       Patient is status post splenectomy for splenomegaly.  Pathology negative for malignancy.  He does have this history of monoclonal B-cell population on bone marrow biopsy.    Small bowel mesentery lymph nodes are stable.  Repeat scans 5/2023.    Patient does have an elevated MCV and anemia, I think his anemia is from chronic disease in his elevated MCV is secondary to having a splenectomy.     Patient continues to follow with pulmonary, no further thoracenteses.  Will have optimization of diuretics per PCP.    Return to clinic in 3 months with labs    Patient knows to come in sooner if any new signs or symptoms occur.      Telly Mendoza II, MD

## 2023-06-05 ENCOUNTER — OFFICE VISIT (OUTPATIENT)
Dept: HEMATOLOGY/ONCOLOGY | Facility: CLINIC | Age: 78
End: 2023-06-05
Payer: MEDICARE

## 2023-06-05 VITALS
OXYGEN SATURATION: 98 % | TEMPERATURE: 98 F | HEART RATE: 72 BPM | BODY MASS INDEX: 31.34 KG/M2 | RESPIRATION RATE: 14 BRPM | HEIGHT: 74 IN | DIASTOLIC BLOOD PRESSURE: 86 MMHG | SYSTOLIC BLOOD PRESSURE: 135 MMHG | WEIGHT: 244.19 LBS

## 2023-06-05 DIAGNOSIS — D61.818 PANCYTOPENIA: Primary | ICD-10-CM

## 2023-06-05 PROCEDURE — 99999 PR PBB SHADOW E&M-EST. PATIENT-LVL III: CPT | Mod: PBBFAC,,, | Performed by: INTERNAL MEDICINE

## 2023-06-05 PROCEDURE — 99213 OFFICE O/P EST LOW 20 MIN: CPT | Mod: PBBFAC | Performed by: INTERNAL MEDICINE

## 2023-06-05 PROCEDURE — 99213 OFFICE O/P EST LOW 20 MIN: CPT | Mod: S$PBB,,, | Performed by: INTERNAL MEDICINE

## 2023-06-05 PROCEDURE — 99213 PR OFFICE/OUTPT VISIT, EST, LEVL III, 20-29 MIN: ICD-10-PCS | Mod: S$PBB,,, | Performed by: INTERNAL MEDICINE

## 2023-06-05 PROCEDURE — 99999 PR PBB SHADOW E&M-EST. PATIENT-LVL III: ICD-10-PCS | Mod: PBBFAC,,, | Performed by: INTERNAL MEDICINE

## 2023-06-05 RX ORDER — BLOOD SUGAR DIAGNOSTIC
STRIP MISCELLANEOUS 2 TIMES DAILY
COMMUNITY
Start: 2023-05-26

## 2023-06-05 RX ORDER — ALBUTEROL SULFATE 90 UG/1
2 AEROSOL, METERED RESPIRATORY (INHALATION) EVERY 6 HOURS PRN
COMMUNITY
Start: 2023-02-22 | End: 2024-04-02

## 2023-06-05 RX ORDER — FLUTICASONE FUROATE, UMECLIDINIUM BROMIDE AND VILANTEROL TRIFENATATE 100; 62.5; 25 UG/1; UG/1; UG/1
POWDER RESPIRATORY (INHALATION)
COMMUNITY
Start: 2023-05-12 | End: 2024-04-02

## 2023-09-12 ENCOUNTER — OFFICE VISIT (OUTPATIENT)
Dept: HEMATOLOGY/ONCOLOGY | Facility: CLINIC | Age: 78
End: 2023-09-12
Payer: MEDICARE

## 2023-09-12 ENCOUNTER — LAB VISIT (OUTPATIENT)
Dept: LAB | Facility: HOSPITAL | Age: 78
End: 2023-09-12
Attending: FAMILY MEDICINE
Payer: MEDICARE

## 2023-09-12 VITALS
DIASTOLIC BLOOD PRESSURE: 66 MMHG | RESPIRATION RATE: 18 BRPM | HEART RATE: 66 BPM | SYSTOLIC BLOOD PRESSURE: 120 MMHG | TEMPERATURE: 98 F | OXYGEN SATURATION: 97 % | HEIGHT: 74 IN | BODY MASS INDEX: 24.73 KG/M2 | WEIGHT: 192.69 LBS

## 2023-09-12 DIAGNOSIS — C43.59 MALIGNANT MELANOMA OF OTHER PART OF TRUNK: ICD-10-CM

## 2023-09-12 DIAGNOSIS — D61.818 PANCYTOPENIA: Primary | ICD-10-CM

## 2023-09-12 DIAGNOSIS — R63.4 WEIGHT LOSS: ICD-10-CM

## 2023-09-12 DIAGNOSIS — R59.0 LYMPHADENOPATHY, MESENTERIC: ICD-10-CM

## 2023-09-12 DIAGNOSIS — D61.818 PANCYTOPENIA: ICD-10-CM

## 2023-09-12 LAB
ABS NEUT CALC (OHS): 6.13 X10(3)/MCL (ref 2.1–9.2)
ACANTHOCYTES (OLG): ABNORMAL
ALBUMIN SERPL-MCNC: 3.7 G/DL (ref 3.4–4.8)
ALBUMIN/GLOB SERPL: 1 RATIO (ref 1.1–2)
ALP SERPL-CCNC: 112 UNIT/L (ref 40–150)
ALT SERPL-CCNC: 9 UNIT/L (ref 0–55)
ANISOCYTOSIS BLD QL SMEAR: ABNORMAL
AST SERPL-CCNC: 14 UNIT/L (ref 5–34)
BILIRUB SERPL-MCNC: 0.8 MG/DL
BUN SERPL-MCNC: 22.9 MG/DL (ref 8.4–25.7)
BURR CELLS (OLG): SLIGHT
CALCIUM SERPL-MCNC: 9 MG/DL (ref 8.8–10)
CHLORIDE SERPL-SCNC: 101 MMOL/L (ref 98–107)
CO2 SERPL-SCNC: 26 MMOL/L (ref 23–31)
CREAT SERPL-MCNC: 0.85 MG/DL (ref 0.73–1.18)
ERYTHROCYTE [DISTWIDTH] IN BLOOD BY AUTOMATED COUNT: 20.8 % (ref 11.5–17)
GFR SERPLBLD CREATININE-BSD FMLA CKD-EPI: >60 MLS/MIN/1.73/M2
GIANT PLATELETS: ABNORMAL
GLOBULIN SER-MCNC: 3.6 GM/DL (ref 2.4–3.5)
GLUCOSE SERPL-MCNC: 131 MG/DL (ref 82–115)
HCT VFR BLD AUTO: 34.6 % (ref 42–52)
HGB BLD-MCNC: 10.5 G/DL (ref 14–18)
HOWELL JOLLY BODIES (OLG): ABNORMAL
HYPOCHROMIA BLD QL SMEAR: SLIGHT
LYMPHOCYTES NFR BLD MANUAL: 2.9 X10(3)/MCL
LYMPHOCYTES NFR BLD MANUAL: 26 % (ref 13–40)
MACROCYTES BLD QL SMEAR: ABNORMAL
MCH RBC QN AUTO: 37 PG (ref 27–31)
MCHC RBC AUTO-ENTMCNC: 30.3 G/DL (ref 33–36)
MCV RBC AUTO: 121.8 FL (ref 80–94)
METAMYELOCYTES NFR BLD MANUAL: 2 %
MONOCYTES NFR BLD MANUAL: 1.11 X10(3)/MCL (ref 0.1–1.3)
MONOCYTES NFR BLD MANUAL: 10 % (ref 2–11)
MYELOCYTES NFR BLD MANUAL: 7 %
NEUTROPHILS NFR BLD MANUAL: 55 % (ref 47–80)
NRBC BLD MANUAL-RTO: 16 %
PLATELET # BLD AUTO: 200 X10(3)/MCL (ref 130–400)
PLATELET # BLD EST: NORMAL 10*3/UL
PMV BLD AUTO: 11.2 FL (ref 7.4–10.4)
POIKILOCYTOSIS BLD QL SMEAR: ABNORMAL
POLYCHROMASIA BLD QL SMEAR: SLIGHT
POTASSIUM SERPL-SCNC: 4.4 MMOL/L (ref 3.5–5.1)
PROT SERPL-MCNC: 7.3 GM/DL (ref 5.8–7.6)
RBC # BLD AUTO: 2.84 X10(6)/MCL (ref 4.7–6.1)
RBC MORPH BLD: ABNORMAL
SODIUM SERPL-SCNC: 138 MMOL/L (ref 136–145)
STIPPLED RBC (OHS): SLIGHT
WBC # SPEC AUTO: 11.14 X10(3)/MCL (ref 4.5–11.5)

## 2023-09-12 PROCEDURE — 99999 PR PBB SHADOW E&M-EST. PATIENT-LVL V: ICD-10-PCS | Mod: PBBFAC,,, | Performed by: NURSE PRACTITIONER

## 2023-09-12 PROCEDURE — 99999 PR PBB SHADOW E&M-EST. PATIENT-LVL V: CPT | Mod: PBBFAC,,, | Performed by: NURSE PRACTITIONER

## 2023-09-12 PROCEDURE — 99215 PR OFFICE/OUTPT VISIT, EST, LEVL V, 40-54 MIN: ICD-10-PCS | Mod: S$PBB,,, | Performed by: NURSE PRACTITIONER

## 2023-09-12 PROCEDURE — 85027 COMPLETE CBC AUTOMATED: CPT

## 2023-09-12 PROCEDURE — 99215 OFFICE O/P EST HI 40 MIN: CPT | Mod: S$PBB,,, | Performed by: NURSE PRACTITIONER

## 2023-09-12 PROCEDURE — 99215 OFFICE O/P EST HI 40 MIN: CPT | Mod: PBBFAC | Performed by: NURSE PRACTITIONER

## 2023-09-12 PROCEDURE — 80053 COMPREHEN METABOLIC PANEL: CPT

## 2023-09-12 PROCEDURE — 36415 COLL VENOUS BLD VENIPUNCTURE: CPT

## 2023-09-12 NOTE — PROGRESS NOTES
Subjective:       Patient ID: Israel Ibarra is a 77 y.o. male.    Chief Complaint: Follow-up (Patient has no concerns today)        Diagnosis:  1.  Pancytopenia with macrocytosis  2.  Vitamin B12 deficiency  3.  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  Repeat bone marrow biopsy on 4/6/2021 showed that the small kappa restricted B-cell population now accounts for less than 1% of the viable lymphocytes.    Current Treatment:   1.  B12 supplementation.       Treatment History:  1.  Splenectomy    HPI:  Patient who was seen by Dr. Neal at [place] for pancytopenia on 8/19/2020.  It was noted that the patient had leukopenia dating back to 2014.  He then began to have neutropenia and anemia in January 2020 and pancytopenia began in July 2020.  Peripheral blood smear on 7/8/2020 showed moderate macrocytic normochromic anemia, moderate leukopenia with absolute neutropenia with a few reactive lymphocytes.  There was mild thrombocytopenia with normal platelet morphology.  The patient then underwent a bone marrow biopsy on 9/8/2020, this showed a hypercellular bone marrow for age (50 to 60%) with trilineage hematopoiesis, lymphoid cells normal in number with few small atypical forms, flow cytometry revealed a small 11% monoclonal B-cell population, indeterminate for malignancy.  FISH was normal, cytogenetics showed 46,Y,inv(X)(p22.1q24)?c[20]. Further work-up was unrevealing occluding HIV negative, hepatitis A IgM negative, hepatitis B core IgM negative, hepatitis B surface antigen negative, hepatitis C antibody negative.  He was however found to be B12 deficient and started on B12 supplementation.  He continued to follow with Dr. Shin, most recently seen on 2/23/2021 via telemedicine.  Due to persistent pancytopenia with macrocytosis without improvement after B12 supplementation, it was recommended that he undergo repeat bone marrow biopsy and  aspiration.  He then saw me initially on 3/8/2021.  At that time, he had no major complaints other than easy bruising.    Repeat bone marrow biopsy on 4/6/2021 revealed normocellular bone marrow with trilineage hematopoiesis and maturation, no increase in lymphocytes, no dysplasia identified, small kappa restricted B-cell population now accounting for less than 1% of viable lymphocytes.  He did have an admission to the hospital on 4/15/2021 for neutropenic fever, no organism identified.  CT scan of the neck, chest, abdomen, and pelvis on 6/14/2021 showed no lymph nodes that are enlarged by size criteria in the neck, chest, abdomen, or pelvis.  There is splenomegaly with the spleen measuring 14.1 x 8.4 cm.  There are a few subcentimeter right sided lung nodules that are nonspecific, but most likely related to either an infectious or inflammatory process.  There was enlarged somewhat irregular prostate extending exophytically into the posterior aspect of the urinary bladder, prostatic malignancy is not excluded.  There is diffuse wall thickening of the urinary bladder that may be related to chronic outlet obstruction or cystitis.  Case discussed at [facility] tumor board on 6/25/2021, decision made to move forward with splenectomy.  Patient underwent planned therapeutic splenectomy on 9/15/2021, tolerated well. Pathology negative for malignancy.  Patient underwent a CT scan of the chest, abdomen, and pelvis on 07/01/2022 that revealed small lymph nodes in the small bowel mesentery that have increased in number and slightly enlarged.  Otherwise, no evidence of other pathology. CT scan of the chest, abdomen, and pelvis done on 10/27/2022 showed stable prominent mesenteric lymph nodes with no other abnormalities.  A repeat CT scan of the chest, abdomen, and pelvis on 04/06/2023 revealed unchanged appearance of prominent but not pathologically enlarged mesenteric lymph nodes with new small bilateral pleural effusions,  mesenteric edema, and anasarca consistent with fluid overload.  There was some gallbladder wall edema without gallbladder distention could be secondary to fluid status but acute cholecystitis could not be entirely excluded.    Interval History:   Patient presents to clinic for scheduled follow up, wife present.  Overall, he is doing okay.  He had back surgery since his last visit and also reports unintentional weight loss.  He has lost about 50 lb in the last 3 months.  Some of his diabetes medications have changed in that time, Including switching from Farxiga to Jardiance.  He is also no longer having issues with swelling and fluid retention.  He denies any areas of pain other than his left leg which is chronic.  He denies any headaches or neurological symptoms.  No bloody or black stools, abdominal pain, shortness of breath.  He is not had any dietary changes, his appetite is good.      Past Medical History:   Diagnosis Date    Diabetes mellitus     Enlarged prostate     Gout     HLD (hyperlipidemia)     [place] (hard of hearing)     Hypertension     Monoclonal B-cell lymphocytosis     Pancytopenia       Past Surgical History:   Procedure Laterality Date    BACK SURGERY      BACK SURGERY      BASAL CELL CARCINOMA EXCISION      head    BONE MARROW BIOPSY W/ ASPIRATION  2021    CATARACT EXTRACTION, BILATERAL      CORONARY ARTERY BYPASS GRAFT (CABG)      LAPAROSCOPIC SPLENECTOMY      SHOULDER SURGERY Left     SHOULDER SURGERY Bilateral     SKIN CANCER EXCISION      on face    TONSILLECTOMY       Social History     Socioeconomic History    Marital status:    Tobacco Use    Smoking status: Former     Current packs/day: 0.00     Average packs/day: 1.3 packs/day for 12.0 years (15.0 ttl pk-yrs)     Types: Cigarettes     Start date:      Quit date:      Years since quittin.7    Smokeless tobacco: Former    Tobacco comments:     patient stated that he quit at age 30   Substance and Sexual Activity     Alcohol use: Never    Drug use: Never      Family History   Problem Relation Age of Onset    No Known Problems Mother     Leukemia Father     Diabetes Sister     Hypertension Sister     Heart disease Sister     Diabetes Brother     Hypertension Brother     Melanoma Brother     Diabetes Sister     Hypertension Sister     Diabetes Sister     Hypertension Sister     Diabetes Sister     Hypertension Sister     Diabetes Sister     Hypertension Sister     Diabetes Brother     Hypertension Brother     Heart disease Brother       Review of patient's allergies indicates:  No Known Allergies   Review of Systems   Constitutional:  Positive for fatigue. Negative for appetite change, fever and unexpected weight change.   HENT:  Negative for nasal congestion, nosebleeds, sinus pressure/congestion and sore throat.    Eyes:  Negative for visual disturbance and eye dryness.   Respiratory:  Positive for shortness of breath. Negative for chest tightness.    Cardiovascular:  Positive for leg swelling. Negative for chest pain.   Gastrointestinal:  Negative for abdominal pain and blood in stool.   Genitourinary:  Negative for difficulty urinating, frequency and hematuria.   Musculoskeletal:  Positive for back pain and leg pain.   Neurological:  Negative for dizziness, syncope and numbness.   Hematological:  Negative for adenopathy.         Objective:      Physical Exam  Vitals reviewed.   Constitutional:       General: He is awake.      Appearance: Normal appearance.   HENT:      Head: Normocephalic and atraumatic.      Right Ear: Hearing normal.      Left Ear: Hearing normal.      Nose: Nose normal.   Eyes:      General: Lids are normal. Vision grossly intact.      Extraocular Movements: Extraocular movements intact.      Conjunctiva/sclera: Conjunctivae normal.   Cardiovascular:      Rate and Rhythm: Normal rate and regular rhythm.      Pulses: Normal pulses.      Heart sounds: Normal heart sounds.   Pulmonary:      Effort: Pulmonary  effort is normal.      Breath sounds: No wheezing, rhonchi or rales.   Abdominal:      General: Bowel sounds are normal.      Palpations: Abdomen is soft.      Tenderness: There is no abdominal tenderness.   Musculoskeletal:      Cervical back: Full passive range of motion without pain.      Right lower leg: No edema.      Left lower leg: No edema.   Lymphadenopathy:      Cervical: No cervical adenopathy.      Upper Body:      Right upper body: No supraclavicular or axillary adenopathy.      Left upper body: No supraclavicular or axillary adenopathy.   Skin:     General: Skin is warm.   Neurological:      General: No focal deficit present.      Mental Status: He is alert and oriented to person, place, and time.   Psychiatric:         Attention and Perception: Attention normal.         Mood and Affect: Mood and affect normal.         Behavior: Behavior is cooperative.         LABS AND IMAGING REVIEWED IN EPIC          Assessment:     1.  Pancytopenia with macrocytosis  2.  Vitamin B12 deficiency  3.  Monoclonal B-cell population on flow cytometry from bone marrow biopsy from September 2020, this involves 11% of the bone marrow, repeat bone marrow showed less than 1% involvement  4. Abnormal weight loss  5. Constipation and bowel habit irregularities         Plan:       Patient is status post splenectomy for splenomegaly.  Pathology negative for malignancy.  He does have this history of monoclonal B-cell population on bone marrow biopsy.    Small bowel mesentery lymph nodes are stable 4/2023.      Patient does have an elevated MCV and anemia, I think his anemia is from chronic disease in his elevated MCV is secondary to having a splenectomy.     Patient continues to follow with pulmonary.       Given his significant weight loss of 50 lb in the past 3 months, his history of melanoma and history of small-bowel mesenteric lymph node enlargement, we will get CT chest abdomen and pelvis.    Return to clinic in 3 months  with labs    Patient knows to come in sooner if any new signs or symptoms occur.      Louisa Guerrier, NP

## 2023-09-26 ENCOUNTER — HOSPITAL ENCOUNTER (OUTPATIENT)
Dept: RADIOLOGY | Facility: HOSPITAL | Age: 78
Discharge: HOME | End: 2023-09-26
Attending: NURSE PRACTITIONER
Payer: MEDICARE

## 2023-09-26 DIAGNOSIS — R63.4 WEIGHT LOSS: ICD-10-CM

## 2023-09-26 DIAGNOSIS — C43.59 MALIGNANT MELANOMA OF OTHER PART OF TRUNK: ICD-10-CM

## 2023-09-26 DIAGNOSIS — R59.0 LYMPHADENOPATHY, MESENTERIC: ICD-10-CM

## 2023-09-26 DIAGNOSIS — D61.818 PANCYTOPENIA: ICD-10-CM

## 2023-09-26 PROCEDURE — 71260 CT THORAX DX C+: CPT | Mod: TC

## 2023-09-26 PROCEDURE — 25500020 PHARM REV CODE 255: Mod: 59 | Performed by: NURSE PRACTITIONER

## 2023-09-26 RX ADMIN — DIATRIZOATE MEGLUMINE AND DIATRIZOATE SODIUM 30 ML: 660; 100 LIQUID ORAL; RECTAL at 11:09

## 2023-09-26 RX ADMIN — IOPAMIDOL 100 ML: 755 INJECTION, SOLUTION INTRAVENOUS at 11:09

## 2023-12-01 ENCOUNTER — DOCUMENTATION ONLY (OUTPATIENT)
Dept: SURGICAL ONCOLOGY | Facility: CLINIC | Age: 78
End: 2023-12-01
Payer: MEDICARE

## 2023-12-01 NOTE — PROGRESS NOTES
Patient's wife called in this morning concerned about the ultrasound of his axilla not being scheduled yet but they have a return yearly appt w us on 12/26. I faxed orders to merlyn for scheduling and set a reminder to f.u. with merlyn next week about scheduling. cpl

## 2023-12-04 ENCOUNTER — DOCUMENTATION ONLY (OUTPATIENT)
Dept: SURGICAL ONCOLOGY | Facility: CLINIC | Age: 78
End: 2023-12-04
Payer: MEDICARE

## 2023-12-04 NOTE — PROGRESS NOTES
Received a letter from [employer] via fax stating that patient denied appointment for the ultrasound. I contacted the patient who stated he did still want the appointment and that he usually does it a week before he comes see us. I contacted [employer] to verify reasoning for cancellation and spoke with Caleb who stated the patient refused to give his date of birth and wouldn't schedule due to him not understanding what this appt was for. I informed him that they'd be contacting him again to give him another date and time for this ultrasound, patient voiced understanding. cpl

## 2023-12-18 ENCOUNTER — OFFICE VISIT (OUTPATIENT)
Dept: HEMATOLOGY/ONCOLOGY | Facility: CLINIC | Age: 78
End: 2023-12-18
Payer: MEDICARE

## 2023-12-18 ENCOUNTER — LAB VISIT (OUTPATIENT)
Dept: LAB | Facility: HOSPITAL | Age: 78
End: 2023-12-18
Attending: NURSE PRACTITIONER
Payer: MEDICARE

## 2023-12-18 VITALS
DIASTOLIC BLOOD PRESSURE: 79 MMHG | HEART RATE: 74 BPM | WEIGHT: 208.69 LBS | HEIGHT: 74 IN | BODY MASS INDEX: 26.78 KG/M2 | SYSTOLIC BLOOD PRESSURE: 135 MMHG | RESPIRATION RATE: 14 BRPM | OXYGEN SATURATION: 94 %

## 2023-12-18 DIAGNOSIS — D61.818 PANCYTOPENIA: Primary | ICD-10-CM

## 2023-12-18 DIAGNOSIS — R63.4 WEIGHT LOSS: ICD-10-CM

## 2023-12-18 DIAGNOSIS — D61.818 PANCYTOPENIA: ICD-10-CM

## 2023-12-18 LAB
ALBUMIN SERPL-MCNC: 3.4 G/DL (ref 3.4–4.8)
ALBUMIN/GLOB SERPL: 0.9 RATIO (ref 1.1–2)
ALP SERPL-CCNC: 114 UNIT/L (ref 40–150)
ALT SERPL-CCNC: 10 UNIT/L (ref 0–55)
AST SERPL-CCNC: 16 UNIT/L (ref 5–34)
BASOPHILS # BLD AUTO: 0.05 X10(3)/MCL
BASOPHILS NFR BLD AUTO: 1 %
BILIRUB SERPL-MCNC: 0.7 MG/DL
BUN SERPL-MCNC: 14.6 MG/DL (ref 8.4–25.7)
CALCIUM SERPL-MCNC: 8.7 MG/DL (ref 8.8–10)
CHLORIDE SERPL-SCNC: 101 MMOL/L (ref 98–107)
CO2 SERPL-SCNC: 29 MMOL/L (ref 23–31)
CREAT SERPL-MCNC: 0.82 MG/DL (ref 0.73–1.18)
EOSINOPHIL # BLD AUTO: 0 X10(3)/MCL (ref 0–0.9)
EOSINOPHIL NFR BLD AUTO: 0 %
ERYTHROCYTE [DISTWIDTH] IN BLOOD BY AUTOMATED COUNT: 18.1 % (ref 11.5–17)
GFR SERPLBLD CREATININE-BSD FMLA CKD-EPI: >60 MLS/MIN/1.73/M2
GLOBULIN SER-MCNC: 3.7 GM/DL (ref 2.4–3.5)
GLUCOSE SERPL-MCNC: 122 MG/DL (ref 82–115)
HCT VFR BLD AUTO: 28.6 % (ref 42–52)
HGB BLD-MCNC: 8.8 G/DL (ref 14–18)
IMM GRANULOCYTES # BLD AUTO: 0.2 X10(3)/MCL (ref 0–0.04)
IMM GRANULOCYTES NFR BLD AUTO: 4 %
LYMPHOCYTES # BLD AUTO: 2.71 X10(3)/MCL (ref 0.6–4.6)
LYMPHOCYTES NFR BLD AUTO: 54.7 %
MCH RBC QN AUTO: 41.5 PG (ref 27–31)
MCHC RBC AUTO-ENTMCNC: 30.8 G/DL (ref 33–36)
MCV RBC AUTO: 134.9 FL (ref 80–94)
MONOCYTES # BLD AUTO: 0.51 X10(3)/MCL (ref 0.1–1.3)
MONOCYTES NFR BLD AUTO: 10.3 %
NEUTROPHILS # BLD AUTO: 1.48 X10(3)/MCL (ref 2.1–9.2)
NEUTROPHILS NFR BLD AUTO: 30 %
PLATELET # BLD AUTO: 143 X10(3)/MCL (ref 130–400)
PMV BLD AUTO: 11.7 FL (ref 7.4–10.4)
POTASSIUM SERPL-SCNC: 4.1 MMOL/L (ref 3.5–5.1)
PROT SERPL-MCNC: 7.1 GM/DL (ref 5.8–7.6)
RBC # BLD AUTO: 2.12 X10(6)/MCL (ref 4.7–6.1)
SODIUM SERPL-SCNC: 139 MMOL/L (ref 136–145)
WBC # SPEC AUTO: 4.95 X10(3)/MCL (ref 4.5–11.5)

## 2023-12-18 PROCEDURE — 99999 PR PBB SHADOW E&M-EST. PATIENT-LVL V: CPT | Mod: PBBFAC,,, | Performed by: INTERNAL MEDICINE

## 2023-12-18 PROCEDURE — 99214 OFFICE O/P EST MOD 30 MIN: CPT | Mod: S$PBB,,, | Performed by: INTERNAL MEDICINE

## 2023-12-18 PROCEDURE — 85025 COMPLETE CBC W/AUTO DIFF WBC: CPT

## 2023-12-18 PROCEDURE — 36415 COLL VENOUS BLD VENIPUNCTURE: CPT

## 2023-12-18 PROCEDURE — 80053 COMPREHEN METABOLIC PANEL: CPT

## 2023-12-18 PROCEDURE — 99999 PR PBB SHADOW E&M-EST. PATIENT-LVL V: ICD-10-PCS | Mod: PBBFAC,,, | Performed by: INTERNAL MEDICINE

## 2023-12-18 PROCEDURE — 99214 PR OFFICE/OUTPT VISIT, EST, LEVL IV, 30-39 MIN: ICD-10-PCS | Mod: S$PBB,,, | Performed by: INTERNAL MEDICINE

## 2023-12-18 PROCEDURE — 99215 OFFICE O/P EST HI 40 MIN: CPT | Mod: PBBFAC | Performed by: INTERNAL MEDICINE

## 2023-12-18 RX ORDER — TRAMADOL HYDROCHLORIDE 50 MG/1
50 TABLET ORAL EVERY 12 HOURS PRN
COMMUNITY

## 2023-12-18 RX ORDER — METFORMIN HYDROCHLORIDE 1000 MG/1
1000 TABLET ORAL 2 TIMES DAILY
COMMUNITY

## 2023-12-18 RX ORDER — TRIAMCINOLONE ACETONIDE 1 MG/G
PASTE DENTAL
COMMUNITY
Start: 2023-11-16

## 2023-12-18 NOTE — PROGRESS NOTES
"Subjective:       Patient ID: Israel Ibarra is a 78 y.o. male.    Chief Complaint: "Leg weakness from surgery"      Diagnosis:  1.  Pancytopenia with macrocytosis  2.  Vitamin B12 deficiency  3.  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  Repeat bone marrow biopsy on 4/6/2021 showed that the small kappa restricted B-cell population now accounts for less than 1% of the viable lymphocytes.    Current Treatment:   1.  B12 supplementation.       Treatment History:  1.  Splenectomy    HPI:  Patient who was seen by Dr. Neal at [facility] for pancytopenia on 8/19/2020.  It was noted that the patient had leukopenia dating back to 2014.  He then began to have neutropenia and anemia in January 2020 and pancytopenia began in July 2020.  Peripheral blood smear on 7/8/2020 showed moderate macrocytic normochromic anemia, moderate leukopenia with absolute neutropenia with a few reactive lymphocytes.  There was mild thrombocytopenia with normal platelet morphology.  The patient then underwent a bone marrow biopsy on 9/8/2020, this showed a hypercellular bone marrow for age (50 to 60%) with trilineage hematopoiesis, lymphoid cells normal in number with few small atypical forms, flow cytometry revealed a small 11% monoclonal B-cell population, indeterminate for malignancy.  FISH was normal, cytogenetics showed 46,Y,inv(X)(p22.1q24)?c[20]. Further work-up was unrevealing occluding HIV negative, hepatitis A IgM negative, hepatitis B core IgM negative, hepatitis B surface antigen negative, hepatitis C antibody negative.  He was however found to be B12 deficient and started on B12 supplementation.  He continued to follow with Dr. Shin, most recently seen on 2/23/2021 via telemedicine.  Due to persistent pancytopenia with macrocytosis without improvement after B12 supplementation, it was recommended that he undergo repeat bone marrow biopsy and aspiration.  He then " saw me initially on 3/8/2021.  At that time, he had no major complaints other than easy bruising.    Repeat bone marrow biopsy on 4/6/2021 revealed normocellular bone marrow with trilineage hematopoiesis and maturation, no increase in lymphocytes, no dysplasia identified, small kappa restricted B-cell population now accounting for less than 1% of viable lymphocytes.  He did have an admission to the hospital on 4/15/2021 for neutropenic fever, no organism identified.  CT scan of the neck, chest, abdomen, and pelvis on 6/14/2021 showed no lymph nodes that are enlarged by size criteria in the neck, chest, abdomen, or pelvis.  There is splenomegaly with the spleen measuring 14.1 x 8.4 cm.  There are a few subcentimeter right sided lung nodules that are nonspecific, but most likely related to either an infectious or inflammatory process.  There was enlarged somewhat irregular prostate extending exophytically into the posterior aspect of the urinary bladder, prostatic malignancy is not excluded.  There is diffuse wall thickening of the urinary bladder that may be related to chronic outlet obstruction or cystitis.  Case discussed at [facility] tumor board on 6/25/2021, decision made to move forward with splenectomy.  Patient underwent planned therapeutic splenectomy on 9/15/2021, tolerated well. Pathology negative for malignancy.  Patient underwent a CT scan of the chest, abdomen, and pelvis on 07/01/2022 that revealed small lymph nodes in the small bowel mesentery that have increased in number and slightly enlarged.  Otherwise, no evidence of other pathology. CT scan of the chest, abdomen, and pelvis done on 10/27/2022 showed stable prominent mesenteric lymph nodes with no other abnormalities.  A repeat CT scan of the chest, abdomen, and pelvis on 04/06/2023 revealed unchanged appearance of prominent but not pathologically enlarged mesenteric lymph nodes with new small bilateral pleural effusions, mesenteric edema, and  anasarca consistent with fluid overload.  There was some gallbladder wall edema without gallbladder distention could be secondary to fluid status but acute cholecystitis could not be entirely excluded.    Interval History:   Patient presents to clinic for scheduled follow up, wife present.  Overall, he is doing okay.  He still has leg weakness from his back surgery.  Otherwise, he states that he is gained weight.  He has no major issues to discuss at this time.      Past Medical History:   Diagnosis Date    Diabetes mellitus     Enlarged prostate     Gout     HLD (hyperlipidemia)     [place] (hard of hearing)     Hypertension     Monoclonal B-cell lymphocytosis     Pancytopenia       Past Surgical History:   Procedure Laterality Date    BACK SURGERY      BACK SURGERY      BASAL CELL CARCINOMA EXCISION      head    BONE MARROW BIOPSY W/ ASPIRATION  2021    CATARACT EXTRACTION, BILATERAL      CORONARY ARTERY BYPASS GRAFT (CABG)      LAPAROSCOPIC SPLENECTOMY      SHOULDER SURGERY Left     SHOULDER SURGERY Bilateral     SKIN CANCER EXCISION      on face    TONSILLECTOMY       Social History     Socioeconomic History    Marital status:    Tobacco Use    Smoking status: Former     Current packs/day: 0.00     Average packs/day: 1.3 packs/day for 12.0 years (15.0 ttl pk-yrs)     Types: Cigarettes     Start date:      Quit date:      Years since quittin.9    Smokeless tobacco: Former    Tobacco comments:     patient stated that he quit at age 30   Substance and Sexual Activity    Alcohol use: Never    Drug use: Never      Family History   Problem Relation Age of Onset    No Known Problems Mother     Leukemia Father     Diabetes Sister     Hypertension Sister     Heart disease Sister     Diabetes Brother     Hypertension Brother     Melanoma Brother     Diabetes Sister     Hypertension Sister     Diabetes Sister     Hypertension Sister     Diabetes Sister     Hypertension Sister     Diabetes Sister      Hypertension Sister     Diabetes Brother     Hypertension Brother     Heart disease Brother       Review of patient's allergies indicates:  No Known Allergies   Review of Systems   Constitutional:  Positive for fatigue. Negative for appetite change, fever and unexpected weight change.   HENT:  Negative for nasal congestion, nosebleeds, sinus pressure/congestion and sore throat.    Eyes:  Negative for visual disturbance and eye dryness.   Respiratory:  Positive for shortness of breath. Negative for chest tightness.    Cardiovascular:  Positive for leg swelling. Negative for chest pain.   Gastrointestinal:  Negative for abdominal pain and blood in stool.   Genitourinary:  Negative for difficulty urinating, frequency and hematuria.   Musculoskeletal:  Positive for back pain and leg pain.   Neurological:  Negative for dizziness, syncope and numbness.   Hematological:  Negative for adenopathy.         Objective:      Physical Exam  Vitals reviewed.   Constitutional:       General: He is awake.      Appearance: Normal appearance.   HENT:      Head: Normocephalic and atraumatic.      Right Ear: Hearing normal.      Left Ear: Hearing normal.      Nose: Nose normal.   Eyes:      General: Lids are normal. Vision grossly intact.      Extraocular Movements: Extraocular movements intact.      Conjunctiva/sclera: Conjunctivae normal.   Cardiovascular:      Rate and Rhythm: Normal rate and regular rhythm.      Pulses: Normal pulses.      Heart sounds: Normal heart sounds.   Pulmonary:      Effort: Pulmonary effort is normal.      Breath sounds: No wheezing, rhonchi or rales.   Abdominal:      General: Bowel sounds are normal.      Palpations: Abdomen is soft.      Tenderness: There is no abdominal tenderness.   Musculoskeletal:      Cervical back: Full passive range of motion without pain.      Right lower leg: No edema.      Left lower leg: No edema.   Lymphadenopathy:      Cervical: No cervical adenopathy.      Upper Body:       Right upper body: No supraclavicular or axillary adenopathy.      Left upper body: No supraclavicular or axillary adenopathy.   Skin:     General: Skin is warm.   Neurological:      General: No focal deficit present.      Mental Status: He is alert and oriented to person, place, and time.   Psychiatric:         Attention and Perception: Attention normal.         Mood and Affect: Mood and affect normal.         Behavior: Behavior is cooperative.         LABS AND IMAGING REVIEWED IN EPIC          Assessment:     1.  Pancytopenia with macrocytosis  2.  Vitamin B12 deficiency  3.  Monoclonal B-cell population on flow cytometry from bone marrow biopsy from September 2020, this involves 11% of the bone marrow, repeat bone marrow showed less than 1% involvement  4. Abnormal weight loss  5. Constipation and bowel habit irregularities         Plan:       Patient is status post splenectomy for splenomegaly.  Pathology negative for malignancy.  He does have this history of monoclonal B-cell population on bone marrow biopsy.    Small bowel mesentery lymph nodes are stable 4/2023.      Patient does have an elevated MCV and anemia, I think his anemia is from chronic disease in his elevated MCV is secondary to having a splenectomy.     Patient continues to follow with pulmonary.      CT scan of the chest, abdomen, and pelvis on 09/26/2023 showed unchanged appearance of prominent but not pathologically enlarged mesenteric lymph nodes with small right pleural effusion that improved from prior along with resolution of the left pleural effusion.    Due to persisting macrocytosis and anemia, I would like to repeat a bone marrow biopsy.

## 2023-12-26 ENCOUNTER — OFFICE VISIT (OUTPATIENT)
Dept: SURGICAL ONCOLOGY | Facility: CLINIC | Age: 78
End: 2023-12-26
Payer: MEDICARE

## 2023-12-26 VITALS
WEIGHT: 213.38 LBS | SYSTOLIC BLOOD PRESSURE: 123 MMHG | DIASTOLIC BLOOD PRESSURE: 75 MMHG | HEIGHT: 74 IN | BODY MASS INDEX: 27.39 KG/M2

## 2023-12-26 DIAGNOSIS — Z85.820 PERSONAL HISTORY OF MALIGNANT MELANOMA OF SKIN: Primary | ICD-10-CM

## 2023-12-26 PROCEDURE — 99214 OFFICE O/P EST MOD 30 MIN: CPT | Mod: S$PBB,,, | Performed by: SURGERY

## 2023-12-26 PROCEDURE — 99999 PR PBB SHADOW E&M-EST. PATIENT-LVL IV: CPT | Mod: PBBFAC,,, | Performed by: SURGERY

## 2023-12-26 PROCEDURE — 99214 PR OFFICE/OUTPT VISIT, EST, LEVL IV, 30-39 MIN: ICD-10-PCS | Mod: S$PBB,,, | Performed by: SURGERY

## 2023-12-26 PROCEDURE — 99999 PR PBB SHADOW E&M-EST. PATIENT-LVL IV: ICD-10-PCS | Mod: PBBFAC,,, | Performed by: SURGERY

## 2023-12-26 PROCEDURE — 99214 OFFICE O/P EST MOD 30 MIN: CPT | Mod: PBBFAC | Performed by: SURGERY

## 2023-12-26 NOTE — PROGRESS NOTES
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  The patient underwent surveillance axillary ultrasound which shows no evidence of suspicious adenopathy.  I personally reviewed and interpreted the images and report.  I discussed these results with him today.  He has no specific complaints.        Past Medical and Surgical History  Allergies :   Patient has no known allergies.    @[facility]@  Medical :   He has a past medical history of Diabetes mellitus, Enlarged prostate, Gout, HLD (hyperlipidemia), [place] (hard of hearing), Hypertension, Monoclonal B-cell lymphocytosis, and Pancytopenia.    Surgical :   He has a past surgical history that includes Coronary Artery Bypass Graft (CABG); Cataract extraction, bilateral; Tonsillectomy; Shoulder surgery (Left); Back surgery; Shoulder surgery (Bilateral); Skin cancer excision; Excision basal cell carcinoma; Laparoscopic splenectomy; Bone marrow biopsy w/ aspiration (04/09/2021); and Back surgery.     Family History  His family history includes Diabetes in his brother, brother, sister, sister, sister, sister, and sister; Heart disease in his brother and sister; Hypertension in his brother, brother, sister, sister, sister, sister, and sister; Leukemia in his father; Melanoma in his brother; No Known Problems in his mother.    Social History  He reports that he quit smoking about 48 years ago. His smoking use included cigarettes. He started smoking about 60 years ago. He has a 15.0 pack-year smoking history. He has quit using smokeless tobacco. He reports that he does not drink alcohol and does not use drugs.     Review of Systems   Constitutional:  Negative for activity change, appetite change, chills, diaphoresis, fatigue and fever.   HENT:  Negative for congestion, ear pain, tinnitus and trouble swallowing.    Eyes:  Negative for photophobia and pain.   Respiratory:  Negative for apnea, cough, choking,  chest tightness, shortness of breath and stridor.    Cardiovascular:  Negative for chest pain, palpitations and leg swelling.   Endocrine: Negative for cold intolerance and heat intolerance.   Genitourinary:  Negative for difficulty urinating, dysuria, enuresis, flank pain, frequency and hematuria.   Musculoskeletal:  Negative for arthralgias, back pain and gait problem.   Neurological:  Negative for dizziness, seizures, syncope, facial asymmetry, speech difficulty, weakness, light-headedness, numbness and headaches.   Psychiatric/Behavioral:  Negative for agitation, behavioral problems, confusion and decreased concentration.    All other systems reviewed and are negative.       Objective   Physical Exam  Constitutional:       General: He is not in acute distress.     Appearance: Normal appearance. He is not ill-appearing, toxic-appearing or diaphoretic.   HENT:      Head: Normocephalic and atraumatic.      Nose: No congestion or rhinorrhea.      Mouth/Throat:      Mouth: Mucous membranes are moist.      Pharynx: Oropharynx is clear.   Eyes:      General: No scleral icterus.     Extraocular Movements: Extraocular movements intact.      Pupils: Pupils are equal, round, and reactive to light.   Cardiovascular:      Rate and Rhythm: Normal rate and regular rhythm.      Pulses: Normal pulses.      Heart sounds: Normal heart sounds. No murmur heard.     No friction rub. No gallop.   Pulmonary:      Effort: Pulmonary effort is normal. No respiratory distress.      Breath sounds: Normal breath sounds. No stridor. No wheezing or rhonchi.   Chest:      Chest wall: No tenderness.   Musculoskeletal:         General: No swelling, tenderness, deformity or signs of injury.      Cervical back: Normal range of motion and neck supple. No rigidity or tenderness.      Right lower leg: No edema.      Left lower leg: No edema.   Lymphadenopathy:      Head:      Right side of head: No submental, submandibular, tonsillar, preauricular,  "posterior auricular or occipital adenopathy.      Cervical: No cervical adenopathy.      Right cervical: No superficial, deep or posterior cervical adenopathy.     Upper Body:      Right upper body: No supraclavicular, axillary, pectoral or epitrochlear adenopathy.   Skin:     General: Skin is warm.      Capillary Refill: Capillary refill takes less than 2 seconds.      Coloration: Skin is not jaundiced or pale.      Findings: No bruising, erythema, lesion or rash.      Comments: Wide excision site right upper chest wall without evidence of local recurrence   Neurological:      General: No focal deficit present.      Mental Status: He is alert and oriented to person, place, and time.   Psychiatric:         Mood and Affect: Mood normal.         Behavior: Behavior normal.         Thought Content: Thought content normal.         Judgment: Judgment normal.       VITAL SIGNS: 24 HR MIN & MAX LAST    @FLOWSTAT(6:24::1)@           @FLOWSTAT(5:24::1)@  123/75     @FLOWSTAT(8:24::1)@  (P) 81     @FLOWSTAT(9:24::1)@       @FLOWSTAT(10:24::1)@         HT: 6' 2" (188 cm)  WT: 96.8 kg (213 lb 6.4 oz)  BMI: 27.4       Assessment & Plan     Melanoma right chest wall, no evidence of recurrence   Return to clinic in 1 year with repeat right axillary ultrasound    "

## 2023-12-31 ENCOUNTER — PATIENT MESSAGE (OUTPATIENT)
Dept: ADMINISTRATIVE | Facility: OTHER | Age: 78
End: 2023-12-31
Payer: MEDICARE

## 2024-01-01 ENCOUNTER — PATIENT MESSAGE (OUTPATIENT)
Dept: ADMINISTRATIVE | Facility: OTHER | Age: 79
End: 2024-01-01
Payer: MEDICARE

## 2024-01-02 ENCOUNTER — PATIENT MESSAGE (OUTPATIENT)
Dept: ADMINISTRATIVE | Facility: OTHER | Age: 79
End: 2024-01-02
Payer: MEDICARE

## 2024-01-02 NOTE — PRE-PROCEDURE INSTRUCTIONS
"[facility]: Outpatient Surgery  Preprocedure Instructions    Expectations: "Because of inconsistent procedure completion times, an unexpected wait may occur. The physicians would like you to be here to prepare in the event they run ahead of time. We will make you as comfortable as possible and keep you informed. We apologize in advance if this happens."     Your arrival time for your surgery or procedure is __830am____.  We ask patients to arrive about 2 hours before surgery to allow for enough time to review your health history & medications, start your IV, complete any outstanding labwork or tests, and meet your Anesthesiologist.    We are located at [facility], [address] [place] [address].    Enter through the [place] entrance next to the Emergency Room, and come to the 6th floor to the Outpatient Surgery Department.    If you are in need of a wheelchair the morning of surgery please call [phone] about 15 minutes before you arrive. Parking is available in our parking garage located off [facility], between the hospital and [facility].      Visitory Policy:  You are allowed 2 adult visitors to be with you in the hospital. Please, no switching visitors in pre-op area. All hospital visitors should be in good current health.  No small children.  We will update you and your family hourly on the progression of surgery and any unexpected delays.      What to Bring:  Please have your ID, insurance cards, and all home medication bottles with you at check in.  Bring your CPAP machine if one is used at home.     Fasting:  Nothing to eat or drink after midnight the night before your procedure. This includes no ice, gum, hard candies, and/or tobacco products.    Medications:  Follow your doctor's instructions for taking any medications on the morning of your procedure.  If no instructions for taking medications were given, do not take any medications but bring your " medications in their bottles to your procedure check in.     Follow your doctor's preoperative instructions regarding skin prep, bowel prep, bathing, or showering prior to your procedure.  If any special soaps were provided to you, please use according to your doctor's instructions. If no instructions were given from your doctor, take a good bath or shower with antibacterial soap the night before and the morning of your procedure.  On the morning of procedure, wear loose, comfortable clothing.  No lotions, makeup, perfumes, colognes, deodorant, or jewelry to your procedure.  Removable items (glasses, contact lenses, dentures, retainers, hearing aids) need to be removed for your procedure.  Bring your storage containers for these items if you wear them.    Outpatient or Same Day Surgeries:  Any patients receiving sedation/anesthesia are advised not to drive for 24 hours after their procedure.  We do not allow patients to drive themselves home after discharge.  If you are going home after your procedure, please have someone available to drive you home from the hospital.     You may call the Outpatient Surgery Department at [phone] with any questions or concerns.  We are looking forward to meeting you and taking great care of you for your procedure.  Thank you for choosing [facility] for your surgical needs.

## 2024-01-03 ENCOUNTER — HOSPITAL ENCOUNTER (OUTPATIENT)
Facility: HOSPITAL | Age: 79
Discharge: HOME | End: 2024-01-03
Attending: PATHOLOGY | Admitting: PATHOLOGY
Payer: MEDICARE

## 2024-01-03 DIAGNOSIS — D61.818 PANCYTOPENIA: ICD-10-CM

## 2024-01-03 LAB
ABS NEUT (OLG): 0.9 X10(3)/MCL (ref 2.1–9.2)
ANISOCYTOSIS BLD QL SMEAR: ABNORMAL
ERYTHROCYTE [DISTWIDTH] IN BLOOD BY AUTOMATED COUNT: 18.1 % (ref 11.5–17)
HCT VFR BLD AUTO: 26.9 % (ref 42–52)
HGB BLD-MCNC: 8.8 G/DL (ref 14–18)
INSTRUMENT WBC (OLG): 2.89 X10(3)/MCL
LYMPHOCYTES NFR BLD MANUAL: 1.65 X10(3)/MCL
LYMPHOCYTES NFR BLD MANUAL: 57 %
MACROCYTES BLD QL SMEAR: ABNORMAL
MCH RBC QN AUTO: 41.7 PG (ref 27–31)
MCHC RBC AUTO-ENTMCNC: 32.7 G/DL (ref 33–36)
MCV RBC AUTO: 127.5 FL (ref 80–94)
MONOCYTES NFR BLD MANUAL: 0.32 X10(3)/MCL (ref 0.1–1.3)
MONOCYTES NFR BLD MANUAL: 11 %
NEUTROPHILS NFR BLD MANUAL: 31 %
NRBC BLD AUTO-RTO: 19.4 %
NRBC BLD MANUAL-RTO: 28 %
PLATELET # BLD AUTO: 120 X10(3)/MCL (ref 130–400)
PLATELET # BLD EST: ABNORMAL 10*3/UL
PMV BLD AUTO: 12.7 FL (ref 7.4–10.4)
POCT GLUCOSE: 138 MG/DL (ref 70–110)
POIKILOCYTOSIS BLD QL SMEAR: ABNORMAL
PROMYELOCYTES # BLD MANUAL: 1 %
RBC # BLD AUTO: 2.11 X10(6)/MCL (ref 4.7–6.1)
RBC MORPH BLD: ABNORMAL
TARGETS BLD QL SMEAR: ABNORMAL
WBC # SPEC AUTO: 2.89 X10(3)/MCL (ref 4.5–11.5)

## 2024-01-03 PROCEDURE — 85027 COMPLETE CBC AUTOMATED: CPT | Performed by: INTERNAL MEDICINE

## 2024-01-03 PROCEDURE — 88341 IMHCHEM/IMCYTCHM EA ADD ANTB: CPT

## 2024-01-03 PROCEDURE — 88311 DECALCIFY TISSUE: CPT

## 2024-01-03 PROCEDURE — 88305 TISSUE EXAM BY PATHOLOGIST: CPT | Mod: 59 | Performed by: INTERNAL MEDICINE

## 2024-01-03 PROCEDURE — 38222 DX BONE MARROW BX & ASPIR: CPT | Performed by: PATHOLOGY

## 2024-01-03 PROCEDURE — 88313 SPECIAL STAINS GROUP 2: CPT | Mod: 59

## 2024-01-03 PROCEDURE — 63600175 PHARM REV CODE 636 W HCPCS: Performed by: PATHOLOGY

## 2024-01-03 PROCEDURE — 99152 MOD SED SAME PHYS/QHP 5/>YRS: CPT | Performed by: PATHOLOGY

## 2024-01-03 PROCEDURE — 88342 IMHCHEM/IMCYTCHM 1ST ANTB: CPT

## 2024-01-03 RX ORDER — SODIUM CHLORIDE 9 MG/ML
INJECTION, SOLUTION INTRAVENOUS CONTINUOUS
Status: DISCONTINUED | OUTPATIENT
Start: 2024-01-03 | End: 2024-01-03 | Stop reason: HOSPADM

## 2024-01-03 RX ORDER — MIDAZOLAM HYDROCHLORIDE 1 MG/ML
1 INJECTION INTRAMUSCULAR; INTRAVENOUS
Status: DISCONTINUED | OUTPATIENT
Start: 2024-01-03 | End: 2024-01-03 | Stop reason: HOSPADM

## 2024-01-03 RX ORDER — FLUMAZENIL 0.1 MG/ML
0.2 INJECTION INTRAVENOUS
Status: DISCONTINUED | OUTPATIENT
Start: 2024-01-03 | End: 2024-01-03 | Stop reason: HOSPADM

## 2024-01-03 RX ADMIN — MIDAZOLAM 3 MG: 1 INJECTION INTRAMUSCULAR; INTRAVENOUS at 10:01

## 2024-01-03 NOTE — PROCEDURES
"Israel Ibarra is a 78 y.o. male patient.    Temp: 98.1 °F (36.7 °C) (01/03/24 0827)  Pulse: 63 (01/03/24 1016)  Resp: 19 (01/03/24 1010)  BP: 127/78 (01/03/24 1004)  SpO2: 98 % (01/03/24 1016)  Weight: 96.2 kg (212 lb 1.3 oz) (01/03/24 0827)  Height: 6' 2" (188 cm) (01/03/24 0827)  Mallampati Scale: Class II  ASA Classification: Class 2    Bone marrow    Date/Time: 1/3/2024 10:19 AM    Performed by: Dylon Wheatley MD  Authorized by: Dylon Wheatley MD    Consent Done?:   Immediately prior to procedure a time out was called to verify the correct patient, procedure, equipment, support staff and site/side marked as required.   Patient was prepped and draped in the usual sterile fashion.      Assistants?: Yes    List of assistants: Alex HELLER was present for the entire procedure.    Position: left lateral  Anesthesia: local infiltration  Aspiration?: Yes   Biopsy?: Yes    Specimen source: posterior iliac crest  Number of Specimens: 2  Estimated blood loss (cc):2  Patient tolerated the procedure well with no immediate complications.  Post-operative instructions were provided for the patient.  Patient was discharged and will follow up if any complications occur.       1/3/2024    "

## 2024-01-03 NOTE — DISCHARGE SUMMARY
[facility] - Periop Services  Discharge Note  Short Stay    Procedure(s) (LRB):  Biopsy-bone marrow (N/A)      OUTCOME: Condition has improved and patient is now ready for discharge.    DISPOSITION: Home or Self Care    FINAL DIAGNOSIS:  <principal problem not specified>    FOLLOWUP: In clinic    DISCHARGE INSTRUCTIONS:  No discharge procedures on file.     TIME SPENT ON DISCHARGE: 10 minutes

## 2024-01-03 NOTE — DISCHARGE INSTRUCTIONS
-No driving for 24 hours post procedure.    -May remove band-aid tomorrow and shower in 24 hours.    - Report to ER with any bleeding, heavy bruising at site, or SEVERE/SUDDEN unrelieved abdominal pain.    - Biopsy results will be sent to your oncologist/referring physician.    - Call with any questions or concerns.    -May take over the counter meds or use ice packs for pain/discomfort.

## 2024-01-03 NOTE — PLAN OF CARE
Band aid to posterior right hip. Clean dry and intact. Patient vitals stable. Patient awake, ready for discharge. Instructions given to pt and wife. They verbalize understanding. IV removed. Pt getting dressed and will be transported to car via wheelchair

## 2024-01-04 ENCOUNTER — PATIENT MESSAGE (OUTPATIENT)
Dept: ADMINISTRATIVE | Facility: OTHER | Age: 79
End: 2024-01-04
Payer: MEDICARE

## 2024-01-04 VITALS
HEIGHT: 74 IN | OXYGEN SATURATION: 97 % | TEMPERATURE: 98 F | SYSTOLIC BLOOD PRESSURE: 125 MMHG | BODY MASS INDEX: 27.22 KG/M2 | RESPIRATION RATE: 19 BRPM | WEIGHT: 212.06 LBS | HEART RATE: 64 BPM | DIASTOLIC BLOOD PRESSURE: 73 MMHG

## 2024-01-12 LAB — PSYCHE PATHOLOGY RESULT: NORMAL

## 2024-01-30 ENCOUNTER — OFFICE VISIT (OUTPATIENT)
Dept: HEMATOLOGY/ONCOLOGY | Facility: CLINIC | Age: 79
End: 2024-01-30
Payer: MEDICARE

## 2024-01-30 VITALS
HEART RATE: 80 BPM | DIASTOLIC BLOOD PRESSURE: 77 MMHG | HEIGHT: 74 IN | WEIGHT: 217.69 LBS | OXYGEN SATURATION: 91 % | SYSTOLIC BLOOD PRESSURE: 124 MMHG | BODY MASS INDEX: 27.94 KG/M2

## 2024-01-30 DIAGNOSIS — D61.818 PANCYTOPENIA: Primary | ICD-10-CM

## 2024-01-30 DIAGNOSIS — D46.9 MDS (MYELODYSPLASTIC SYNDROME): ICD-10-CM

## 2024-01-30 DIAGNOSIS — D46.Z MYELODYSPLASTIC SYNDROME, LOW GRADE: ICD-10-CM

## 2024-01-30 LAB
BASOPHILS # BLD AUTO: 0.04 X10(3)/MCL
BASOPHILS NFR BLD AUTO: 0.8 %
EOSINOPHIL # BLD AUTO: 0 X10(3)/MCL (ref 0–0.9)
EOSINOPHIL NFR BLD AUTO: 0 %
ERYTHROCYTE [DISTWIDTH] IN BLOOD BY AUTOMATED COUNT: 17.4 % (ref 11.5–17)
HCT VFR BLD AUTO: 31.1 % (ref 42–52)
HGB BLD-MCNC: 9.5 G/DL (ref 14–18)
IMM GRANULOCYTES # BLD AUTO: 0.3 X10(3)/MCL (ref 0–0.04)
IMM GRANULOCYTES NFR BLD AUTO: 6.3 %
LYMPHOCYTES # BLD AUTO: 2.72 X10(3)/MCL (ref 0.6–4.6)
LYMPHOCYTES NFR BLD AUTO: 57.4 %
MCH RBC QN AUTO: 41.3 PG (ref 27–31)
MCHC RBC AUTO-ENTMCNC: 30.5 G/DL (ref 33–36)
MCV RBC AUTO: 135.2 FL (ref 80–94)
MONOCYTES # BLD AUTO: 0.57 X10(3)/MCL (ref 0.1–1.3)
MONOCYTES NFR BLD AUTO: 12 %
NEUTROPHILS # BLD AUTO: 1.11 X10(3)/MCL (ref 2.1–9.2)
NEUTROPHILS NFR BLD AUTO: 23.5 %
PLATELET # BLD AUTO: 127 X10(3)/MCL (ref 130–400)
PMV BLD AUTO: 11.1 FL (ref 7.4–10.4)
RBC # BLD AUTO: 2.3 X10(6)/MCL (ref 4.7–6.1)
WBC # SPEC AUTO: 4.74 X10(3)/MCL (ref 4.5–11.5)

## 2024-01-30 PROCEDURE — 36415 COLL VENOUS BLD VENIPUNCTURE: CPT | Performed by: INTERNAL MEDICINE

## 2024-01-30 PROCEDURE — 82668 ASSAY OF ERYTHROPOIETIN: CPT | Performed by: INTERNAL MEDICINE

## 2024-01-30 PROCEDURE — 85025 COMPLETE CBC W/AUTO DIFF WBC: CPT | Performed by: INTERNAL MEDICINE

## 2024-01-30 PROCEDURE — 99215 OFFICE O/P EST HI 40 MIN: CPT | Mod: S$PBB,,, | Performed by: INTERNAL MEDICINE

## 2024-01-30 PROCEDURE — 99215 OFFICE O/P EST HI 40 MIN: CPT | Mod: PBBFAC | Performed by: INTERNAL MEDICINE

## 2024-01-30 PROCEDURE — 99999 PR PBB SHADOW E&M-EST. PATIENT-LVL V: CPT | Mod: PBBFAC,,, | Performed by: INTERNAL MEDICINE

## 2024-01-30 RX ORDER — MONTELUKAST SODIUM 10 MG/1
10 TABLET ORAL
COMMUNITY
Start: 2024-01-15 | End: 2024-05-28

## 2024-01-30 NOTE — PROGRESS NOTES
"Subjective:       Patient ID: Israel Ibarra is a 78 y.o. male.    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  Repeat bone marrow biopsy on 4/6/2021 showed that the small kappa restricted B-cell population now accounts for less than 1% of the viable lymphocytes.    Current Treatment:    1.  B12 supplementation.     Treatment History:  1.  Splenectomy    HPI:  Patient who was seen by Dr. Neal at [facility] for pancytopenia on 8/19/2020.  It was noted that the patient had leukopenia dating back to 2014.  He then began to have neutropenia and anemia in January 2020 and pancytopenia began in July 2020.  Peripheral blood smear on 7/8/2020 showed moderate macrocytic normochromic anemia, moderate leukopenia with absolute neutropenia with a few reactive lymphocytes.  There was mild thrombocytopenia with normal platelet morphology.  The patient then underwent a bone marrow biopsy on 9/8/2020, this showed a hypercellular bone marrow for age (50 to 60%) with trilineage hematopoiesis, lymphoid cells normal in number with few small atypical forms, flow cytometry revealed a small 11% monoclonal B-cell population, indeterminate for malignancy.  FISH was normal, cytogenetics showed 46,Y,inv(X)(p22.1q24)?c[20]. Further work-up was unrevealing occluding HIV negative, hepatitis A IgM negative, hepatitis B core IgM negative, hepatitis B surface antigen negative, hepatitis C antibody negative.  He was however found to be B12 deficient and started on B12 supplementation.  He continued to follow with Dr. Shin, most recently seen on 2/23/2021 via telemedicine.  Due to persistent pancytopenia with macrocytosis without improvement after B12 supplementation, it was recommended that he undergo repeat bone marrow biopsy and aspiration.  He then saw me initially on 3/8/2021.  At " that time, he had no major complaints other than easy bruising.    Repeat bone marrow biopsy on 4/6/2021 revealed normocellular bone marrow with trilineage hematopoiesis and maturation, no increase in lymphocytes, no dysplasia identified, small kappa restricted B-cell population now accounting for less than 1% of viable lymphocytes.  He did have an admission to the hospital on 4/15/2021 for neutropenic fever, no organism identified.  CT scan of the neck, chest, abdomen, and pelvis on 6/14/2021 showed no lymph nodes that are enlarged by size criteria in the neck, chest, abdomen, or pelvis.  There is splenomegaly with the spleen measuring 14.1 x 8.4 cm.  There are a few subcentimeter right sided lung nodules that are nonspecific, but most likely related to either an infectious or inflammatory process.  There was enlarged somewhat irregular prostate extending exophytically into the posterior aspect of the urinary bladder, prostatic malignancy is not excluded.  There is diffuse wall thickening of the urinary bladder that may be related to chronic outlet obstruction or cystitis.  Case discussed at [facility] tumor board on 6/25/2021, decision made to move forward with splenectomy.  Patient underwent planned therapeutic splenectomy on 9/15/2021, tolerated well. Pathology negative for malignancy.  Patient underwent a CT scan of the chest, abdomen, and pelvis on 07/01/2022 that revealed small lymph nodes in the small bowel mesentery that have increased in number and slightly enlarged.  Otherwise, no evidence of other pathology. CT scan of the chest, abdomen, and pelvis done on 10/27/2022 showed stable prominent mesenteric lymph nodes with no other abnormalities.  A repeat CT scan of the chest, abdomen, and pelvis on 04/06/2023 revealed unchanged appearance of prominent but not pathologically enlarged mesenteric lymph nodes with new small bilateral pleural effusions, mesenteric edema, and anasarca consistent with fluid  overload.  There was some gallbladder wall edema without gallbladder distention could be secondary to fluid status but acute cholecystitis could not be entirely excluded.    Repeat bone marrow biopsy done on 2024 due to persisting macrocytosis and anemia showed a hypercellular bone marrow (50%) with mild disc megakaryopoiesis and this erythropoiesis highly suspicious for MDS with cytogenetics showing 47,Y,idic(X)(q13),+8(20) and these changes are primarily associated with MDS.  His IPSS R score gets to points for intermediate cytogenetics and 1.4 a hemoglobin between 8 and 10 giving him a score of 3 which is low risk MDS.    Interval History:   Patient presents to clinic for scheduled follow up, wife present.  Overall, he is doing okay.  His biggest issue is that he was tired and sleeps a lot.      Past Medical History:   Diagnosis Date    Diabetes mellitus     Enlarged prostate     Gout     HLD (hyperlipidemia)     [place] (hard of hearing)     Hypertension     Monoclonal B-cell lymphocytosis     Pancytopenia       Past Surgical History:   Procedure Laterality Date    BACK SURGERY      BACK SURGERY      BASAL CELL CARCINOMA EXCISION      head    BONE MARROW BIOPSY N/A 1/3/2024    Procedure: Biopsy-bone marrow;  Surgeon: Dylon Wheatley MD;  Location: [facility];  Service: General;  Laterality: N/A;    BONE MARROW BIOPSY W/ ASPIRATION  2021    CATARACT EXTRACTION, BILATERAL      CORONARY ARTERY BYPASS GRAFT (CABG)      LAPAROSCOPIC SPLENECTOMY      SHOULDER SURGERY Left     SHOULDER SURGERY Bilateral     SKIN CANCER EXCISION      on face    TONSILLECTOMY       Social History     Socioeconomic History    Marital status:    Tobacco Use    Smoking status: Former     Current packs/day: 0.00     Average packs/day: 1.3 packs/day for 12.0 years (15.0 ttl pk-yrs)     Types: Cigarettes     Start date:      Quit date:      Years since quittin.1    Smokeless tobacco: Former    Tobacco comments:      patient stated that he quit at age 30   Substance and Sexual Activity    Alcohol use: Never    Drug use: Never      Family History   Problem Relation Age of Onset    No Known Problems Mother     Leukemia Father     Diabetes Sister     Hypertension Sister     Heart disease Sister     Diabetes Brother     Hypertension Brother     Melanoma Brother     Diabetes Sister     Hypertension Sister     Diabetes Sister     Hypertension Sister     Diabetes Sister     Hypertension Sister     Diabetes Sister     Hypertension Sister     Diabetes Brother     Hypertension Brother     Heart disease Brother       Review of patient's allergies indicates:  No Known Allergies   Review of Systems   Constitutional:  Positive for fatigue. Negative for appetite change, fever and unexpected weight change.   HENT:  Positive for mouth sores. Negative for nasal congestion, nosebleeds, sinus pressure/congestion and sore throat.    Eyes:  Negative for visual disturbance and eye dryness.   Respiratory:  Positive for cough and shortness of breath. Negative for chest tightness.    Cardiovascular:  Positive for leg swelling. Negative for chest pain.   Gastrointestinal:  Negative for abdominal pain, blood in stool and diarrhea.   Genitourinary:  Negative for difficulty urinating, frequency and hematuria.   Musculoskeletal:  Positive for back pain and leg pain.   Integumentary:  Negative for rash.   Neurological:  Negative for dizziness, syncope, numbness and headaches.   Hematological:  Negative for adenopathy.   Psychiatric/Behavioral:  The patient is not nervous/anxious.          Objective:      Physical Exam  Vitals reviewed.   Constitutional:       General: He is awake.      Appearance: Normal appearance.   HENT:      Head: Normocephalic and atraumatic.      Right Ear: Hearing normal.      Left Ear: Hearing normal.      Nose: Nose normal.   Eyes:      General: Lids are normal. Vision grossly intact.      Extraocular Movements: Extraocular movements  intact.      Conjunctiva/sclera: Conjunctivae normal.   Cardiovascular:      Rate and Rhythm: Normal rate and regular rhythm.      Pulses: Normal pulses.      Heart sounds: Normal heart sounds.   Pulmonary:      Effort: Pulmonary effort is normal.      Breath sounds: No wheezing, rhonchi or rales.   Abdominal:      General: Bowel sounds are normal.      Palpations: Abdomen is soft.      Tenderness: There is no abdominal tenderness.   Musculoskeletal:      Cervical back: Full passive range of motion without pain.      Right lower leg: No edema.      Left lower leg: No edema.   Lymphadenopathy:      Cervical: No cervical adenopathy.      Upper Body:      Right upper body: No supraclavicular or axillary adenopathy.      Left upper body: No supraclavicular or axillary adenopathy.   Skin:     General: Skin is warm.   Neurological:      General: No focal deficit present.      Mental Status: He is alert and oriented to person, place, and time.   Psychiatric:         Attention and Perception: Attention normal.         Mood and Affect: Mood and affect normal.         Behavior: Behavior is cooperative.         LABS AND IMAGING REVIEWED IN EPIC          Assessment:     1.  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  Vitamin B12 deficiency  3.  Monoclonal B-cell population on flow cytometry from bone marrow biopsy from September 2020, this involves 11% of the bone marrow, repeat bone marrow showed less than 1% involvement  4. Abnormal weight loss  5. Constipation and bowel habit irregularities         Plan:       Patient is status post splenectomy for splenomegaly.  Pathology negative for malignancy.  He does have this history of monoclonal B-cell population on bone marrow biopsy.    Small bowel mesentery lymph nodes are stable 4/2023.      Patient does have an elevated MCV and anemia, decided to repeat a bone marrow biopsy on 01/03/2024.  This revealed low risk MDS based off of IPSS-R score, cytogenetics showing 47,Y,idic(X)(q13),+8(20)    CBC and EPO  level today    Planning to start retacrit every 2 weeks PRN    Return to clinic in 8 weeks          Telly Mendoza II, MD

## 2024-01-31 LAB — EPO SERPL-ACNC: 80.4 MIU/ML (ref 2.6–18.5)

## 2024-02-07 ENCOUNTER — LAB VISIT (OUTPATIENT)
Dept: LAB | Facility: HOSPITAL | Age: 79
End: 2024-02-07
Attending: INTERNAL MEDICINE
Payer: MEDICARE

## 2024-02-07 ENCOUNTER — INFUSION (OUTPATIENT)
Dept: INFUSION THERAPY | Facility: HOSPITAL | Age: 79
End: 2024-02-07
Attending: INTERNAL MEDICINE
Payer: MEDICARE

## 2024-02-07 VITALS
HEIGHT: 74 IN | BODY MASS INDEX: 28.3 KG/M2 | DIASTOLIC BLOOD PRESSURE: 73 MMHG | SYSTOLIC BLOOD PRESSURE: 131 MMHG | TEMPERATURE: 98 F | HEART RATE: 88 BPM | OXYGEN SATURATION: 92 % | RESPIRATION RATE: 16 BRPM | WEIGHT: 220.5 LBS

## 2024-02-07 DIAGNOSIS — D46.9 MDS (MYELODYSPLASTIC SYNDROME): ICD-10-CM

## 2024-02-07 DIAGNOSIS — D46.Z MYELODYSPLASTIC SYNDROME, LOW GRADE: Primary | ICD-10-CM

## 2024-02-07 LAB
ABS NEUT CALC (OHS): 21.6 X10(3)/MCL (ref 2.1–9.2)
ALBUMIN SERPL-MCNC: 3.4 G/DL (ref 3.4–4.8)
ALBUMIN/GLOB SERPL: 0.9 RATIO (ref 1.1–2)
ALP SERPL-CCNC: 107 UNIT/L (ref 40–150)
ALT SERPL-CCNC: 12 UNIT/L (ref 0–55)
ANISOCYTOSIS BLD QL SMEAR: ABNORMAL
AST SERPL-CCNC: 18 UNIT/L (ref 5–34)
BILIRUB SERPL-MCNC: 0.9 MG/DL
BUN SERPL-MCNC: 37.8 MG/DL (ref 8.4–25.7)
CALCIUM SERPL-MCNC: 8.6 MG/DL (ref 8.8–10)
CHLORIDE SERPL-SCNC: 99 MMOL/L (ref 98–107)
CO2 SERPL-SCNC: 27 MMOL/L (ref 23–31)
CREAT SERPL-MCNC: 1.7 MG/DL (ref 0.73–1.18)
ERYTHROCYTE [DISTWIDTH] IN BLOOD BY AUTOMATED COUNT: 18.6 % (ref 11.5–17)
GFR SERPLBLD CREATININE-BSD FMLA CKD-EPI: 41 MLS/MIN/1.73/M2
GLOBULIN SER-MCNC: 3.7 GM/DL (ref 2.4–3.5)
GLUCOSE SERPL-MCNC: 189 MG/DL (ref 82–115)
HCT VFR BLD AUTO: 29.3 % (ref 42–52)
HGB BLD-MCNC: 8.9 G/DL (ref 14–18)
LYMPHOCYTES NFR BLD MANUAL: 2.29 X10(3)/MCL
LYMPHOCYTES NFR BLD MANUAL: 9 % (ref 13–40)
MACROCYTES BLD QL SMEAR: ABNORMAL
MCH RBC QN AUTO: 41.6 PG (ref 27–31)
MCHC RBC AUTO-ENTMCNC: 30.4 G/DL (ref 33–36)
MCV RBC AUTO: 136.9 FL (ref 80–94)
METAMYELOCYTES NFR BLD MANUAL: 4 %
MONOCYTES NFR BLD MANUAL: 0.51 X10(3)/MCL (ref 0.1–1.3)
MONOCYTES NFR BLD MANUAL: 2 % (ref 2–11)
NEUTROPHILS NFR BLD MANUAL: 85 % (ref 47–80)
PLATELET # BLD AUTO: 149 X10(3)/MCL (ref 130–400)
PLATELET # BLD EST: NORMAL 10*3/UL
PMV BLD AUTO: 12.1 FL (ref 7.4–10.4)
POIKILOCYTOSIS BLD QL SMEAR: ABNORMAL
POTASSIUM SERPL-SCNC: 4.9 MMOL/L (ref 3.5–5.1)
PROT SERPL-MCNC: 7.1 GM/DL (ref 5.8–7.6)
RBC # BLD AUTO: 2.14 X10(6)/MCL (ref 4.7–6.1)
RBC MORPH BLD: ABNORMAL
SODIUM SERPL-SCNC: 136 MMOL/L (ref 136–145)
TARGETS BLD QL SMEAR: ABNORMAL
WBC # SPEC AUTO: 25.41 X10(3)/MCL (ref 4.5–11.5)

## 2024-02-07 PROCEDURE — 63600175 PHARM REV CODE 636 W HCPCS: Mod: JZ,EC,JG | Performed by: INTERNAL MEDICINE

## 2024-02-07 PROCEDURE — 96372 THER/PROPH/DIAG INJ SC/IM: CPT

## 2024-02-07 PROCEDURE — 36415 COLL VENOUS BLD VENIPUNCTURE: CPT

## 2024-02-07 PROCEDURE — 80053 COMPREHEN METABOLIC PANEL: CPT

## 2024-02-07 PROCEDURE — 85027 COMPLETE CBC AUTOMATED: CPT

## 2024-02-07 RX ADMIN — EPOETIN ALFA-EPBX 40000 UNITS: 40000 INJECTION, SOLUTION INTRAVENOUS; SUBCUTANEOUS at 02:02

## 2024-02-20 ENCOUNTER — INFUSION (OUTPATIENT)
Dept: INFUSION THERAPY | Facility: HOSPITAL | Age: 79
End: 2024-02-20
Attending: INTERNAL MEDICINE
Payer: MEDICARE

## 2024-02-20 ENCOUNTER — LAB VISIT (OUTPATIENT)
Dept: LAB | Facility: HOSPITAL | Age: 79
End: 2024-02-20
Attending: INTERNAL MEDICINE
Payer: MEDICARE

## 2024-02-20 VITALS
BODY MASS INDEX: 27.57 KG/M2 | HEIGHT: 74 IN | TEMPERATURE: 98 F | DIASTOLIC BLOOD PRESSURE: 68 MMHG | RESPIRATION RATE: 16 BRPM | SYSTOLIC BLOOD PRESSURE: 129 MMHG | HEART RATE: 76 BPM | OXYGEN SATURATION: 92 % | WEIGHT: 214.81 LBS

## 2024-02-20 DIAGNOSIS — D46.9 MDS (MYELODYSPLASTIC SYNDROME): ICD-10-CM

## 2024-02-20 DIAGNOSIS — D46.Z MYELODYSPLASTIC SYNDROME, LOW GRADE: Primary | ICD-10-CM

## 2024-02-20 LAB
ALBUMIN SERPL-MCNC: 3.6 G/DL (ref 3.4–4.8)
ALBUMIN/GLOB SERPL: 1 RATIO (ref 1.1–2)
ALP SERPL-CCNC: 91 UNIT/L (ref 40–150)
ALT SERPL-CCNC: 8 UNIT/L (ref 0–55)
AST SERPL-CCNC: 16 UNIT/L (ref 5–34)
BASOPHILS # BLD AUTO: 0.05 X10(3)/MCL
BASOPHILS NFR BLD AUTO: 1 %
BILIRUB SERPL-MCNC: 0.8 MG/DL
BUN SERPL-MCNC: 15.4 MG/DL (ref 8.4–25.7)
CALCIUM SERPL-MCNC: 8.7 MG/DL (ref 8.8–10)
CHLORIDE SERPL-SCNC: 100 MMOL/L (ref 98–107)
CO2 SERPL-SCNC: 32 MMOL/L (ref 23–31)
CREAT SERPL-MCNC: 0.93 MG/DL (ref 0.73–1.18)
EOSINOPHIL # BLD AUTO: 0 X10(3)/MCL (ref 0–0.9)
EOSINOPHIL NFR BLD AUTO: 0 %
ERYTHROCYTE [DISTWIDTH] IN BLOOD BY AUTOMATED COUNT: 18.9 % (ref 11.5–17)
GFR SERPLBLD CREATININE-BSD FMLA CKD-EPI: >60 MLS/MIN/1.73/M2
GLOBULIN SER-MCNC: 3.7 GM/DL (ref 2.4–3.5)
GLUCOSE SERPL-MCNC: 169 MG/DL (ref 82–115)
HCT VFR BLD AUTO: 31.4 % (ref 42–52)
HGB BLD-MCNC: 9.6 G/DL (ref 14–18)
IMM GRANULOCYTES # BLD AUTO: 0.16 X10(3)/MCL (ref 0–0.04)
IMM GRANULOCYTES NFR BLD AUTO: 3.1 %
LYMPHOCYTES # BLD AUTO: 3.05 X10(3)/MCL (ref 0.6–4.6)
LYMPHOCYTES NFR BLD AUTO: 59 %
MCH RBC QN AUTO: 40.5 PG (ref 27–31)
MCHC RBC AUTO-ENTMCNC: 30.6 G/DL (ref 33–36)
MCV RBC AUTO: 132.5 FL (ref 80–94)
MONOCYTES # BLD AUTO: 0.95 X10(3)/MCL (ref 0.1–1.3)
MONOCYTES NFR BLD AUTO: 18.4 %
NEUTROPHILS # BLD AUTO: 0.96 X10(3)/MCL (ref 2.1–9.2)
NEUTROPHILS NFR BLD AUTO: 18.5 %
PLATELET # BLD AUTO: 159 X10(3)/MCL (ref 130–400)
PMV BLD AUTO: 11.8 FL (ref 7.4–10.4)
POTASSIUM SERPL-SCNC: 3.7 MMOL/L (ref 3.5–5.1)
PROT SERPL-MCNC: 7.3 GM/DL (ref 5.8–7.6)
RBC # BLD AUTO: 2.37 X10(6)/MCL (ref 4.7–6.1)
SODIUM SERPL-SCNC: 140 MMOL/L (ref 136–145)
WBC # SPEC AUTO: 5.17 X10(3)/MCL (ref 4.5–11.5)

## 2024-02-20 PROCEDURE — 96372 THER/PROPH/DIAG INJ SC/IM: CPT

## 2024-02-20 PROCEDURE — 63600175 PHARM REV CODE 636 W HCPCS: Mod: JZ,EC,JG | Performed by: NURSE PRACTITIONER

## 2024-02-20 PROCEDURE — 80053 COMPREHEN METABOLIC PANEL: CPT

## 2024-02-20 PROCEDURE — 36415 COLL VENOUS BLD VENIPUNCTURE: CPT

## 2024-02-20 PROCEDURE — 85025 COMPLETE CBC W/AUTO DIFF WBC: CPT

## 2024-02-20 RX ADMIN — EPOETIN ALFA-EPBX 40000 UNITS: 40000 INJECTION, SOLUTION INTRAVENOUS; SUBCUTANEOUS at 01:02

## 2024-03-05 ENCOUNTER — LAB VISIT (OUTPATIENT)
Dept: LAB | Facility: HOSPITAL | Age: 79
End: 2024-03-05
Attending: INTERNAL MEDICINE
Payer: MEDICARE

## 2024-03-05 ENCOUNTER — INFUSION (OUTPATIENT)
Dept: INFUSION THERAPY | Facility: HOSPITAL | Age: 79
End: 2024-03-05
Attending: INTERNAL MEDICINE
Payer: MEDICARE

## 2024-03-05 VITALS
HEART RATE: 73 BPM | OXYGEN SATURATION: 93 % | RESPIRATION RATE: 18 BRPM | SYSTOLIC BLOOD PRESSURE: 121 MMHG | DIASTOLIC BLOOD PRESSURE: 71 MMHG

## 2024-03-05 DIAGNOSIS — D46.Z MYELODYSPLASTIC SYNDROME, LOW GRADE: Primary | ICD-10-CM

## 2024-03-05 DIAGNOSIS — D46.9 MDS (MYELODYSPLASTIC SYNDROME): ICD-10-CM

## 2024-03-05 LAB
ALBUMIN SERPL-MCNC: 3.5 G/DL (ref 3.4–4.8)
ALBUMIN/GLOB SERPL: 0.9 RATIO (ref 1.1–2)
ALP SERPL-CCNC: 111 UNIT/L (ref 40–150)
ALT SERPL-CCNC: 9 UNIT/L (ref 0–55)
AST SERPL-CCNC: 17 UNIT/L (ref 5–34)
BASOPHILS # BLD AUTO: 0.05 X10(3)/MCL
BASOPHILS NFR BLD AUTO: 1 %
BILIRUB SERPL-MCNC: 0.8 MG/DL
BUN SERPL-MCNC: 21.3 MG/DL (ref 8.4–25.7)
CALCIUM SERPL-MCNC: 8.5 MG/DL (ref 8.8–10)
CHLORIDE SERPL-SCNC: 97 MMOL/L (ref 98–107)
CO2 SERPL-SCNC: 34 MMOL/L (ref 23–31)
CREAT SERPL-MCNC: 1.03 MG/DL (ref 0.73–1.18)
EOSINOPHIL # BLD AUTO: 0 X10(3)/MCL (ref 0–0.9)
EOSINOPHIL NFR BLD AUTO: 0 %
ERYTHROCYTE [DISTWIDTH] IN BLOOD BY AUTOMATED COUNT: 19.6 % (ref 11.5–17)
GFR SERPLBLD CREATININE-BSD FMLA CKD-EPI: >60 MLS/MIN/1.73/M2
GLOBULIN SER-MCNC: 3.9 GM/DL (ref 2.4–3.5)
GLUCOSE SERPL-MCNC: 161 MG/DL (ref 82–115)
HCT VFR BLD AUTO: 30.9 % (ref 42–52)
HGB BLD-MCNC: 9.5 G/DL (ref 14–18)
IMM GRANULOCYTES # BLD AUTO: 0.18 X10(3)/MCL (ref 0–0.04)
IMM GRANULOCYTES NFR BLD AUTO: 3.6 %
LYMPHOCYTES # BLD AUTO: 2.66 X10(3)/MCL (ref 0.6–4.6)
LYMPHOCYTES NFR BLD AUTO: 53.2 %
MCH RBC QN AUTO: 39.6 PG (ref 27–31)
MCHC RBC AUTO-ENTMCNC: 30.7 G/DL (ref 33–36)
MCV RBC AUTO: 128.8 FL (ref 80–94)
MONOCYTES # BLD AUTO: 0.67 X10(3)/MCL (ref 0.1–1.3)
MONOCYTES NFR BLD AUTO: 13.4 %
NEUTROPHILS # BLD AUTO: 1.44 X10(3)/MCL (ref 2.1–9.2)
NEUTROPHILS NFR BLD AUTO: 28.8 %
PLATELET # BLD AUTO: 142 X10(3)/MCL (ref 130–400)
PMV BLD AUTO: 11.6 FL (ref 7.4–10.4)
POTASSIUM SERPL-SCNC: 4.1 MMOL/L (ref 3.5–5.1)
PROT SERPL-MCNC: 7.4 GM/DL (ref 5.8–7.6)
RBC # BLD AUTO: 2.4 X10(6)/MCL (ref 4.7–6.1)
SODIUM SERPL-SCNC: 137 MMOL/L (ref 136–145)
WBC # SPEC AUTO: 5 X10(3)/MCL (ref 4.5–11.5)

## 2024-03-05 PROCEDURE — 63600175 PHARM REV CODE 636 W HCPCS: Mod: JZ,EC,JG | Performed by: INTERNAL MEDICINE

## 2024-03-05 PROCEDURE — 80053 COMPREHEN METABOLIC PANEL: CPT

## 2024-03-05 PROCEDURE — 85025 COMPLETE CBC W/AUTO DIFF WBC: CPT

## 2024-03-05 PROCEDURE — 36415 COLL VENOUS BLD VENIPUNCTURE: CPT

## 2024-03-05 PROCEDURE — 96372 THER/PROPH/DIAG INJ SC/IM: CPT

## 2024-03-05 RX ADMIN — EPOETIN ALFA-EPBX 40000 UNITS: 40000 INJECTION, SOLUTION INTRAVENOUS; SUBCUTANEOUS at 01:03

## 2024-03-19 ENCOUNTER — INFUSION (OUTPATIENT)
Dept: INFUSION THERAPY | Facility: HOSPITAL | Age: 79
End: 2024-03-19
Attending: INTERNAL MEDICINE
Payer: MEDICARE

## 2024-03-19 ENCOUNTER — LAB VISIT (OUTPATIENT)
Dept: LAB | Facility: HOSPITAL | Age: 79
End: 2024-03-19
Attending: INTERNAL MEDICINE
Payer: MEDICARE

## 2024-03-19 VITALS — DIASTOLIC BLOOD PRESSURE: 72 MMHG | HEART RATE: 80 BPM | TEMPERATURE: 98 F | SYSTOLIC BLOOD PRESSURE: 135 MMHG

## 2024-03-19 DIAGNOSIS — D46.9 MDS (MYELODYSPLASTIC SYNDROME): ICD-10-CM

## 2024-03-19 DIAGNOSIS — D46.Z MYELODYSPLASTIC SYNDROME, LOW GRADE: Primary | ICD-10-CM

## 2024-03-19 LAB
ALBUMIN SERPL-MCNC: 3.4 G/DL (ref 3.4–4.8)
ALBUMIN/GLOB SERPL: 0.9 RATIO (ref 1.1–2)
ALP SERPL-CCNC: 104 UNIT/L (ref 40–150)
ALT SERPL-CCNC: 6 UNIT/L (ref 0–55)
AST SERPL-CCNC: 13 UNIT/L (ref 5–34)
BASOPHILS # BLD AUTO: 0.03 X10(3)/MCL
BASOPHILS NFR BLD AUTO: 0.9 %
BILIRUB SERPL-MCNC: 0.7 MG/DL
BUN SERPL-MCNC: 19.4 MG/DL (ref 8.4–25.7)
CALCIUM SERPL-MCNC: 8.8 MG/DL (ref 8.8–10)
CHLORIDE SERPL-SCNC: 102 MMOL/L (ref 98–107)
CO2 SERPL-SCNC: 28 MMOL/L (ref 23–31)
CREAT SERPL-MCNC: 0.97 MG/DL (ref 0.73–1.18)
EOSINOPHIL # BLD AUTO: 0 X10(3)/MCL (ref 0–0.9)
EOSINOPHIL NFR BLD AUTO: 0 %
ERYTHROCYTE [DISTWIDTH] IN BLOOD BY AUTOMATED COUNT: 20.3 % (ref 11.5–17)
GFR SERPLBLD CREATININE-BSD FMLA CKD-EPI: >60 MLS/MIN/1.73/M2
GLOBULIN SER-MCNC: 3.9 GM/DL (ref 2.4–3.5)
GLUCOSE SERPL-MCNC: 215 MG/DL (ref 82–115)
HCT VFR BLD AUTO: 32.4 % (ref 42–52)
HGB BLD-MCNC: 9.8 G/DL (ref 14–18)
IMM GRANULOCYTES # BLD AUTO: 0.1 X10(3)/MCL (ref 0–0.04)
IMM GRANULOCYTES NFR BLD AUTO: 2.9 %
LYMPHOCYTES # BLD AUTO: 2.13 X10(3)/MCL (ref 0.6–4.6)
LYMPHOCYTES NFR BLD AUTO: 62.3 %
MCH RBC QN AUTO: 39.4 PG (ref 27–31)
MCHC RBC AUTO-ENTMCNC: 30.2 G/DL (ref 33–36)
MCV RBC AUTO: 130.1 FL (ref 80–94)
MONOCYTES # BLD AUTO: 0.51 X10(3)/MCL (ref 0.1–1.3)
MONOCYTES NFR BLD AUTO: 14.9 %
NEUTROPHILS # BLD AUTO: 0.65 X10(3)/MCL (ref 2.1–9.2)
NEUTROPHILS NFR BLD AUTO: 19 %
PLATELET # BLD AUTO: 116 X10(3)/MCL (ref 130–400)
PMV BLD AUTO: 11.5 FL (ref 7.4–10.4)
POTASSIUM SERPL-SCNC: 4.5 MMOL/L (ref 3.5–5.1)
PROT SERPL-MCNC: 7.3 GM/DL (ref 5.8–7.6)
RBC # BLD AUTO: 2.49 X10(6)/MCL (ref 4.7–6.1)
SODIUM SERPL-SCNC: 139 MMOL/L (ref 136–145)
WBC # SPEC AUTO: 3.42 X10(3)/MCL (ref 4.5–11.5)

## 2024-03-19 PROCEDURE — 96372 THER/PROPH/DIAG INJ SC/IM: CPT

## 2024-03-19 PROCEDURE — 85025 COMPLETE CBC W/AUTO DIFF WBC: CPT

## 2024-03-19 PROCEDURE — 36415 COLL VENOUS BLD VENIPUNCTURE: CPT

## 2024-03-19 PROCEDURE — 63600175 PHARM REV CODE 636 W HCPCS: Mod: JZ,EC,JG | Performed by: NURSE PRACTITIONER

## 2024-03-19 PROCEDURE — 80053 COMPREHEN METABOLIC PANEL: CPT

## 2024-03-19 RX ADMIN — EPOETIN ALFA-EPBX 40000 UNITS: 40000 INJECTION, SOLUTION INTRAVENOUS; SUBCUTANEOUS at 03:03

## 2024-04-02 ENCOUNTER — OFFICE VISIT (OUTPATIENT)
Dept: HEMATOLOGY/ONCOLOGY | Facility: CLINIC | Age: 79
End: 2024-04-02
Payer: MEDICARE

## 2024-04-02 ENCOUNTER — INFUSION (OUTPATIENT)
Dept: INFUSION THERAPY | Facility: HOSPITAL | Age: 79
End: 2024-04-02
Attending: INTERNAL MEDICINE
Payer: MEDICARE

## 2024-04-02 VITALS
TEMPERATURE: 98 F | BODY MASS INDEX: 27.8 KG/M2 | HEART RATE: 75 BPM | SYSTOLIC BLOOD PRESSURE: 131 MMHG | HEIGHT: 74 IN | DIASTOLIC BLOOD PRESSURE: 70 MMHG | WEIGHT: 216.63 LBS

## 2024-04-02 DIAGNOSIS — D61.818 PANCYTOPENIA: ICD-10-CM

## 2024-04-02 DIAGNOSIS — D46.Z MYELODYSPLASTIC SYNDROME, LOW GRADE: Primary | ICD-10-CM

## 2024-04-02 PROCEDURE — 99214 OFFICE O/P EST MOD 30 MIN: CPT | Mod: S$PBB,,, | Performed by: NURSE PRACTITIONER

## 2024-04-02 PROCEDURE — 99215 OFFICE O/P EST HI 40 MIN: CPT | Mod: PBBFAC,25 | Performed by: NURSE PRACTITIONER

## 2024-04-02 PROCEDURE — 63600175 PHARM REV CODE 636 W HCPCS: Mod: JZ,EC,JG | Performed by: NURSE PRACTITIONER

## 2024-04-02 PROCEDURE — 96372 THER/PROPH/DIAG INJ SC/IM: CPT

## 2024-04-02 PROCEDURE — 99999 PR PBB SHADOW E&M-EST. PATIENT-LVL V: CPT | Mod: PBBFAC,,, | Performed by: NURSE PRACTITIONER

## 2024-04-02 RX ADMIN — EPOETIN ALFA-EPBX 40000 UNITS: 40000 INJECTION, SOLUTION INTRAVENOUS; SUBCUTANEOUS at 02:04

## 2024-04-02 NOTE — PROGRESS NOTES
"Subjective:       Patient ID: Israel Ibarra is a 78 y.o. male.    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  Repeat bone marrow biopsy on 4/6/2021 showed that the small kappa restricted B-cell population now accounts for less than 1% of the viable lymphocytes.    Current Treatment:    1.  B12 supplementation.     Treatment History:  1.  Splenectomy    HPI:  Patient who was seen by Dr. Neal at [facility] for pancytopenia on 8/19/2020.  It was noted that the patient had leukopenia dating back to 2014.  He then began to have neutropenia and anemia in January 2020 and pancytopenia began in July 2020.  Peripheral blood smear on 7/8/2020 showed moderate macrocytic normochromic anemia, moderate leukopenia with absolute neutropenia with a few reactive lymphocytes.  There was mild thrombocytopenia with normal platelet morphology.  The patient then underwent a bone marrow biopsy on 9/8/2020, this showed a hypercellular bone marrow for age (50 to 60%) with trilineage hematopoiesis, lymphoid cells normal in number with few small atypical forms, flow cytometry revealed a small 11% monoclonal B-cell population, indeterminate for malignancy.  FISH was normal, cytogenetics showed 46,Y,inv(X)(p22.1q24)?c[20]. Further work-up was unrevealing occluding HIV negative, hepatitis A IgM negative, hepatitis B core IgM negative, hepatitis B surface antigen negative, hepatitis C antibody negative.  He was however found to be B12 deficient and started on B12 supplementation.  He continued to follow with Dr. Shin, most recently seen on 2/23/2021 via telemedicine.  Due to persistent pancytopenia with macrocytosis without improvement after B12 supplementation, it was recommended that he undergo repeat bone marrow biopsy and aspiration.  He then saw me initially on 3/8/2021.  At " that time, he had no major complaints other than easy bruising.    Repeat bone marrow biopsy on 4/6/2021 revealed normocellular bone marrow with trilineage hematopoiesis and maturation, no increase in lymphocytes, no dysplasia identified, small kappa restricted B-cell population now accounting for less than 1% of viable lymphocytes.  He did have an admission to the hospital on 4/15/2021 for neutropenic fever, no organism identified.  CT scan of the neck, chest, abdomen, and pelvis on 6/14/2021 showed no lymph nodes that are enlarged by size criteria in the neck, chest, abdomen, or pelvis.  There is splenomegaly with the spleen measuring 14.1 x 8.4 cm.  There are a few subcentimeter right sided lung nodules that are nonspecific, but most likely related to either an infectious or inflammatory process.  There was enlarged somewhat irregular prostate extending exophytically into the posterior aspect of the urinary bladder, prostatic malignancy is not excluded.  There is diffuse wall thickening of the urinary bladder that may be related to chronic outlet obstruction or cystitis.  Case discussed at [facility] tumor board on 6/25/2021, decision made to move forward with splenectomy.  Patient underwent planned therapeutic splenectomy on 9/15/2021, tolerated well. Pathology negative for malignancy.  Patient underwent a CT scan of the chest, abdomen, and pelvis on 07/01/2022 that revealed small lymph nodes in the small bowel mesentery that have increased in number and slightly enlarged.  Otherwise, no evidence of other pathology. CT scan of the chest, abdomen, and pelvis done on 10/27/2022 showed stable prominent mesenteric lymph nodes with no other abnormalities.  A repeat CT scan of the chest, abdomen, and pelvis on 04/06/2023 revealed unchanged appearance of prominent but not pathologically enlarged mesenteric lymph nodes with new small bilateral pleural effusions, mesenteric edema, and anasarca consistent with fluid  overload.  There was some gallbladder wall edema without gallbladder distention could be secondary to fluid status but acute cholecystitis could not be entirely excluded.    Repeat bone marrow biopsy done on 2024 due to persisting macrocytosis and anemia showed a hypercellular bone marrow (50%) with mild disc megakaryopoiesis and this erythropoiesis highly suspicious for MDS with cytogenetics showing 47,Y,idic(X)(q13),+8(20) and these changes are primarily associated with MDS.  His IPSS R score gets to points for intermediate cytogenetics and 1.4 a hemoglobin between 8 and 10 giving him a score of 3 which is low risk MDS.    Interval History:   Patient presents to clinic for scheduled follow up, wife present.  Overall, he is doing okay. He has no complaints today.    Past Medical History:   Diagnosis Date    Diabetes mellitus     Enlarged prostate     Gout     HLD (hyperlipidemia)     [place] (hard of hearing)     Hypertension     Monoclonal B-cell lymphocytosis     Pancytopenia       Past Surgical History:   Procedure Laterality Date    BACK SURGERY      BACK SURGERY      BASAL CELL CARCINOMA EXCISION      head    BONE MARROW BIOPSY N/A 1/3/2024    Procedure: Biopsy-bone marrow;  Surgeon: Dylon Wheatley MD;  Location: [facility];  Service: General;  Laterality: N/A;    BONE MARROW BIOPSY W/ ASPIRATION  2021    CATARACT EXTRACTION, BILATERAL      CORONARY ARTERY BYPASS GRAFT (CABG)      LAPAROSCOPIC SPLENECTOMY      SHOULDER SURGERY Left     SHOULDER SURGERY Bilateral     SKIN CANCER EXCISION      on face    TONSILLECTOMY       Social History     Socioeconomic History    Marital status:    Tobacco Use    Smoking status: Former     Current packs/day: 0.00     Average packs/day: 1.3 packs/day for 12.0 years (15.0 ttl pk-yrs)     Types: Cigarettes     Start date:      Quit date:      Years since quittin.2    Smokeless tobacco: Former    Tobacco comments:     patient stated that he quit at age  30   Substance and Sexual Activity    Alcohol use: Never    Drug use: Never      Family History   Problem Relation Age of Onset    No Known Problems Mother     Leukemia Father     Diabetes Sister     Hypertension Sister     Heart disease Sister     Diabetes Brother     Hypertension Brother     Melanoma Brother     Diabetes Sister     Hypertension Sister     Diabetes Sister     Hypertension Sister     Diabetes Sister     Hypertension Sister     Diabetes Sister     Hypertension Sister     Diabetes Brother     Hypertension Brother     Heart disease Brother       Review of patient's allergies indicates:  No Known Allergies   Review of Systems   Constitutional:  Positive for fatigue. Negative for appetite change, fever and unexpected weight change.   HENT:  Negative for nasal congestion, nosebleeds, sinus pressure/congestion and sore throat.    Eyes:  Negative for visual disturbance and eye dryness.   Respiratory:  Negative for chest tightness.    Cardiovascular:  Positive for leg swelling. Negative for chest pain.   Gastrointestinal:  Negative for abdominal pain, blood in stool and diarrhea.   Genitourinary:  Negative for difficulty urinating, frequency and hematuria.   Musculoskeletal:  Positive for back pain and leg pain.   Neurological:  Negative for dizziness, syncope, numbness and headaches.   Hematological:  Negative for adenopathy.   Psychiatric/Behavioral:  The patient is not nervous/anxious.          Objective:      Physical Exam  Vitals reviewed.   Constitutional:       General: He is awake.      Appearance: Normal appearance.   HENT:      Head: Normocephalic and atraumatic.      Right Ear: Hearing normal.      Left Ear: Hearing normal.      Nose: Nose normal.   Eyes:      General: Lids are normal. Vision grossly intact.      Extraocular Movements: Extraocular movements intact.      Conjunctiva/sclera: Conjunctivae normal.   Cardiovascular:      Rate and Rhythm: Normal rate and regular rhythm.      Pulses:  Normal pulses.      Heart sounds: Normal heart sounds.   Pulmonary:      Effort: Pulmonary effort is normal.      Breath sounds: No wheezing, rhonchi or rales.   Abdominal:      General: Bowel sounds are normal.      Palpations: Abdomen is soft.   Musculoskeletal:      Cervical back: Full passive range of motion without pain.      Right lower leg: Edema present.      Left lower leg: Edema present.   Skin:     General: Skin is warm.   Neurological:      General: No focal deficit present.      Mental Status: He is alert and oriented to person, place, and time.   Psychiatric:         Attention and Perception: Attention normal.         Mood and Affect: Mood and affect normal.         Behavior: Behavior is cooperative.         LABS AND IMAGING REVIEWED IN EPIC          Assessment:     1.  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  Vitamin B12 deficiency  3.  Monoclonal B-cell population on flow cytometry from bone marrow biopsy from September 2020, this involves 11% of the bone marrow, repeat bone marrow showed less than 1% involvement  4. Abnormal weight loss  5. Constipation and bowel habit irregularities         Plan:       Patient is status post splenectomy for splenomegaly.  Pathology negative for malignancy.  He does have this history of monoclonal B-cell population on bone marrow biopsy.    Small bowel mesentery lymph nodes are stable 4/2023.      Patient does have an elevated MCV and anemia, decided to repeat a bone marrow biopsy on 01/03/2024.  This revealed low risk MDS based off of IPSS-R score, cytogenetics showing 47,Y,idic(X)(q13),+8(20)    Retacrit every 2 weeks PRN    Return to clinic in 8 weeks          SHEA Garcia

## 2024-04-16 ENCOUNTER — INFUSION (OUTPATIENT)
Dept: INFUSION THERAPY | Facility: HOSPITAL | Age: 79
End: 2024-04-16
Attending: INTERNAL MEDICINE
Payer: MEDICARE

## 2024-04-16 VITALS
DIASTOLIC BLOOD PRESSURE: 61 MMHG | SYSTOLIC BLOOD PRESSURE: 138 MMHG | HEIGHT: 74 IN | WEIGHT: 212 LBS | BODY MASS INDEX: 27.21 KG/M2 | TEMPERATURE: 97 F | RESPIRATION RATE: 18 BRPM | HEART RATE: 75 BPM

## 2024-04-16 DIAGNOSIS — C43.59 MALIGNANT MELANOMA OF OTHER PART OF TRUNK: ICD-10-CM

## 2024-04-16 DIAGNOSIS — D61.818 PANCYTOPENIA: ICD-10-CM

## 2024-04-16 DIAGNOSIS — D46.Z MYELODYSPLASTIC SYNDROME, LOW GRADE: Primary | ICD-10-CM

## 2024-04-16 PROCEDURE — 96372 THER/PROPH/DIAG INJ SC/IM: CPT

## 2024-04-16 PROCEDURE — 63600175 PHARM REV CODE 636 W HCPCS: Mod: JZ,EC,JG | Performed by: NURSE PRACTITIONER

## 2024-04-16 RX ADMIN — EPOETIN ALFA-EPBX 40000 UNITS: 40000 INJECTION, SOLUTION INTRAVENOUS; SUBCUTANEOUS at 03:04

## 2024-04-16 NOTE — PLAN OF CARE
Plan of care reviewed with patient; patient in agreement. Retacrit given for Hgb 10.2. Appts reviewed; patient family uses portal.

## 2024-04-30 ENCOUNTER — INFUSION (OUTPATIENT)
Dept: INFUSION THERAPY | Facility: HOSPITAL | Age: 79
End: 2024-04-30
Attending: INTERNAL MEDICINE
Payer: MEDICARE

## 2024-04-30 VITALS
OXYGEN SATURATION: 92 % | HEART RATE: 78 BPM | TEMPERATURE: 98 F | SYSTOLIC BLOOD PRESSURE: 134 MMHG | RESPIRATION RATE: 18 BRPM | DIASTOLIC BLOOD PRESSURE: 74 MMHG

## 2024-04-30 DIAGNOSIS — D46.Z MYELODYSPLASTIC SYNDROME, LOW GRADE: Primary | ICD-10-CM

## 2024-04-30 DIAGNOSIS — C43.59 MALIGNANT MELANOMA OF OTHER PART OF TRUNK: ICD-10-CM

## 2024-04-30 DIAGNOSIS — D61.818 PANCYTOPENIA: ICD-10-CM

## 2024-04-30 PROCEDURE — 96372 THER/PROPH/DIAG INJ SC/IM: CPT

## 2024-04-30 PROCEDURE — 63600175 PHARM REV CODE 636 W HCPCS: Mod: JZ,EC,JG | Performed by: NURSE PRACTITIONER

## 2024-04-30 RX ADMIN — EPOETIN ALFA-EPBX 40000 UNITS: 40000 INJECTION, SOLUTION INTRAVENOUS; SUBCUTANEOUS at 03:04

## 2024-05-14 ENCOUNTER — INFUSION (OUTPATIENT)
Dept: INFUSION THERAPY | Facility: HOSPITAL | Age: 79
End: 2024-05-14
Attending: INTERNAL MEDICINE
Payer: MEDICARE

## 2024-05-14 VITALS — SYSTOLIC BLOOD PRESSURE: 108 MMHG | HEART RATE: 69 BPM | DIASTOLIC BLOOD PRESSURE: 49 MMHG | RESPIRATION RATE: 18 BRPM

## 2024-05-14 DIAGNOSIS — D46.Z MYELODYSPLASTIC SYNDROME, LOW GRADE: Primary | ICD-10-CM

## 2024-05-14 DIAGNOSIS — D61.818 PANCYTOPENIA: ICD-10-CM

## 2024-05-14 DIAGNOSIS — C43.59 MALIGNANT MELANOMA OF OTHER PART OF TRUNK: ICD-10-CM

## 2024-05-14 PROCEDURE — 63600175 PHARM REV CODE 636 W HCPCS: Mod: JZ,EC,JG | Performed by: INTERNAL MEDICINE

## 2024-05-14 PROCEDURE — 96372 THER/PROPH/DIAG INJ SC/IM: CPT

## 2024-05-14 RX ADMIN — EPOETIN ALFA-EPBX 40000 UNITS: 40000 INJECTION, SOLUTION INTRAVENOUS; SUBCUTANEOUS at 02:05

## 2024-05-28 ENCOUNTER — INFUSION (OUTPATIENT)
Dept: INFUSION THERAPY | Facility: HOSPITAL | Age: 79
End: 2024-05-28
Attending: INTERNAL MEDICINE
Payer: MEDICARE

## 2024-05-28 ENCOUNTER — OFFICE VISIT (OUTPATIENT)
Dept: HEMATOLOGY/ONCOLOGY | Facility: CLINIC | Age: 79
End: 2024-05-28
Payer: MEDICARE

## 2024-05-28 VITALS
TEMPERATURE: 98 F | RESPIRATION RATE: 15 BRPM | HEIGHT: 74 IN | WEIGHT: 195 LBS | OXYGEN SATURATION: 95 % | DIASTOLIC BLOOD PRESSURE: 64 MMHG | SYSTOLIC BLOOD PRESSURE: 118 MMHG | HEART RATE: 68 BPM | BODY MASS INDEX: 25.03 KG/M2

## 2024-05-28 DIAGNOSIS — C43.59 MALIGNANT MELANOMA OF OTHER PART OF TRUNK: ICD-10-CM

## 2024-05-28 DIAGNOSIS — D61.818 PANCYTOPENIA: ICD-10-CM

## 2024-05-28 DIAGNOSIS — D46.Z MYELODYSPLASTIC SYNDROME, LOW GRADE: Primary | ICD-10-CM

## 2024-05-28 PROCEDURE — 63600175 PHARM REV CODE 636 W HCPCS: Mod: JZ,EC,JG | Performed by: NURSE PRACTITIONER

## 2024-05-28 PROCEDURE — 99999 PR PBB SHADOW E&M-EST. PATIENT-LVL V: CPT | Mod: PBBFAC,,, | Performed by: NURSE PRACTITIONER

## 2024-05-28 PROCEDURE — 96372 THER/PROPH/DIAG INJ SC/IM: CPT

## 2024-05-28 PROCEDURE — 99214 OFFICE O/P EST MOD 30 MIN: CPT | Mod: S$PBB,,, | Performed by: NURSE PRACTITIONER

## 2024-05-28 PROCEDURE — 99215 OFFICE O/P EST HI 40 MIN: CPT | Mod: PBBFAC,25 | Performed by: NURSE PRACTITIONER

## 2024-05-28 RX ORDER — LEVOTHYROXINE SODIUM 25 UG/1
25 TABLET ORAL
COMMUNITY
Start: 2024-05-17 | End: 2024-11-13

## 2024-05-28 RX ADMIN — EPOETIN ALFA-EPBX 40000 UNITS: 40000 INJECTION, SOLUTION INTRAVENOUS; SUBCUTANEOUS at 02:05

## 2024-05-28 NOTE — PROGRESS NOTES
"Subjective:       Patient ID: Israel Ibarra is a 78 y.o. male.    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  Repeat bone marrow biopsy on 4/6/2021 showed that the small kappa restricted B-cell population now accounts for less than 1% of the viable lymphocytes.    Current Treatment:    1.  B12 supplementation.     Treatment History:  1.  Splenectomy    HPI:  Patient who was seen by Dr. Neal at [facility] for pancytopenia on 8/19/2020.  It was noted that the patient had leukopenia dating back to 2014.  He then began to have neutropenia and anemia in January 2020 and pancytopenia began in July 2020.  Peripheral blood smear on 7/8/2020 showed moderate macrocytic normochromic anemia, moderate leukopenia with absolute neutropenia with a few reactive lymphocytes.  There was mild thrombocytopenia with normal platelet morphology.  The patient then underwent a bone marrow biopsy on 9/8/2020, this showed a hypercellular bone marrow for age (50 to 60%) with trilineage hematopoiesis, lymphoid cells normal in number with few small atypical forms, flow cytometry revealed a small 11% monoclonal B-cell population, indeterminate for malignancy.  FISH was normal, cytogenetics showed 46,Y,inv(X)(p22.1q24)?c[20]. Further work-up was unrevealing occluding HIV negative, hepatitis A IgM negative, hepatitis B core IgM negative, hepatitis B surface antigen negative, hepatitis C antibody negative.  He was however found to be B12 deficient and started on B12 supplementation.  He continued to follow with Dr. Shin, most recently seen on 2/23/2021 via telemedicine.  Due to persistent pancytopenia with macrocytosis without improvement after B12 supplementation, it was recommended that he undergo repeat bone marrow biopsy and aspiration.  He then saw me initially on 3/8/2021.  At " that time, he had no major complaints other than easy bruising.    Repeat bone marrow biopsy on 4/6/2021 revealed normocellular bone marrow with trilineage hematopoiesis and maturation, no increase in lymphocytes, no dysplasia identified, small kappa restricted B-cell population now accounting for less than 1% of viable lymphocytes.  He did have an admission to the hospital on 4/15/2021 for neutropenic fever, no organism identified.  CT scan of the neck, chest, abdomen, and pelvis on 6/14/2021 showed no lymph nodes that are enlarged by size criteria in the neck, chest, abdomen, or pelvis.  There is splenomegaly with the spleen measuring 14.1 x 8.4 cm.  There are a few subcentimeter right sided lung nodules that are nonspecific, but most likely related to either an infectious or inflammatory process.  There was enlarged somewhat irregular prostate extending exophytically into the posterior aspect of the urinary bladder, prostatic malignancy is not excluded.  There is diffuse wall thickening of the urinary bladder that may be related to chronic outlet obstruction or cystitis.  Case discussed at [facility] tumor board on 6/25/2021, decision made to move forward with splenectomy.  Patient underwent planned therapeutic splenectomy on 9/15/2021, tolerated well. Pathology negative for malignancy.  Patient underwent a CT scan of the chest, abdomen, and pelvis on 07/01/2022 that revealed small lymph nodes in the small bowel mesentery that have increased in number and slightly enlarged.  Otherwise, no evidence of other pathology. CT scan of the chest, abdomen, and pelvis done on 10/27/2022 showed stable prominent mesenteric lymph nodes with no other abnormalities.  A repeat CT scan of the chest, abdomen, and pelvis on 04/06/2023 revealed unchanged appearance of prominent but not pathologically enlarged mesenteric lymph nodes with new small bilateral pleural effusions, mesenteric edema, and anasarca consistent with fluid  overload.  There was some gallbladder wall edema without gallbladder distention could be secondary to fluid status but acute cholecystitis could not be entirely excluded.    Repeat bone marrow biopsy done on 2024 due to persisting macrocytosis and anemia showed a hypercellular bone marrow (50%) with mild disc megakaryopoiesis and this erythropoiesis highly suspicious for MDS with cytogenetics showing 47,Y,idic(X)(q13),+8(20) and these changes are primarily associated with MDS.  His IPSS R score gets to points for intermediate cytogenetics and 1.4 a hemoglobin between 8 and 10 giving him a score of 3 which is low risk MDS.    Interval History:   Patient presents to clinic for scheduled follow up.  Overall, he is doing okay.  Hospitalization on 2024 due to a pleural effusion.  He denies any fevers or bleeding.    Past Medical History:   Diagnosis Date    Diabetes mellitus     Enlarged prostate     Gout     HLD (hyperlipidemia)     [place] (hard of hearing)     Hypertension     Monoclonal B-cell lymphocytosis     Pancytopenia       Past Surgical History:   Procedure Laterality Date    BACK SURGERY      BACK SURGERY      BASAL CELL CARCINOMA EXCISION      head    BONE MARROW BIOPSY N/A 1/3/2024    Procedure: Biopsy-bone marrow;  Surgeon: Dylon Wheatley MD;  Location: [facility];  Service: General;  Laterality: N/A;    BONE MARROW BIOPSY W/ ASPIRATION  2021    CATARACT EXTRACTION, BILATERAL      CORONARY ARTERY BYPASS GRAFT (CABG)      LAPAROSCOPIC SPLENECTOMY      SHOULDER SURGERY Left     SHOULDER SURGERY Bilateral     SKIN CANCER EXCISION      on face    TONSILLECTOMY       Social History     Socioeconomic History    Marital status:    Tobacco Use    Smoking status: Former     Current packs/day: 0.00     Average packs/day: 1.3 packs/day for 12.0 years (15.0 ttl pk-yrs)     Types: Cigarettes     Start date:      Quit date:      Years since quittin.4    Smokeless tobacco: Former    Tobacco  comments:     patient stated that he quit at age 30   Substance and Sexual Activity    Alcohol use: Never    Drug use: Never     Social Determinants of Health     Financial Resource Strain: Low Risk  (4/30/2024)    [facility]    Overall Financial Resource Strain (CARDIA)     Difficulty of Paying Living Expenses: Not very hard   Food Insecurity: No Food Insecurity (4/30/2024)    [facility]    Hunger Vital Sign     Worried About Running Out of Food in the Last Year: Never true     Ran Out of Food in the Last Year: Never true   Transportation Needs: No Transportation Needs (4/30/2024)    Received from [facility] and [facility]    PRAPARE - Transportation     Lack of Transportation (Medical): No     Lack of Transportation (Non-Medical): No   Physical Activity: Unknown (4/30/2024)    Received from [facility] Its [facility] and [facility]    Exercise Vital Sign     Days of Exercise per Week: 0 days     Minutes of Exercise per Session: Patient declined   Stress: No Stress Concern Present (4/30/2024)    [facility]    [place] of [employer]ational [employer] - Occupational Stress Questionnaire     Feeling of Stress : Only a little   Housing Stability: Unknown (6/7/2023)    [facility], [facility]    Housing Stability Vital Sign     Unable to Pay for Housing in the Last Year: No     Number of Places Lived in the Last Year: 1      Family History   Problem Relation Name Age of Onset    No Known Problems  Mother      Leukemia Father      Diabetes Sister      Hypertension Sister      Heart disease Sister      Diabetes Brother      Hypertension Brother      Melanoma Brother      Diabetes Sister      Hypertension Sister      Diabetes Sister      Hypertension Sister      Diabetes Sister      Hypertension Sister      Diabetes Sister      Hypertension Sister      Diabetes Brother      Hypertension Brother      Heart disease Brother        Review of patient's allergies indicates:  No Known Allergies   Review of Systems   Constitutional:  Positive for fatigue. Negative for appetite change, fever and unexpected weight change.   HENT:  Negative for nasal congestion, nosebleeds, sinus pressure/congestion and sore throat.    Eyes:  Negative for visual disturbance and eye dryness.   Respiratory:  Negative for chest tightness.    Cardiovascular:  Positive for leg swelling. Negative for chest pain.   Gastrointestinal:  Negative for abdominal pain, blood in stool and diarrhea.   Genitourinary:  Negative for difficulty urinating, frequency and hematuria.   Musculoskeletal:  Positive for back pain and leg pain.   Neurological:  Negative for dizziness, syncope, numbness and headaches.   Hematological:  Negative for adenopathy.   Psychiatric/Behavioral:  The patient is not nervous/anxious.          Objective:      Physical Exam  Vitals reviewed.   Constitutional:       General: He is awake.      Appearance: Normal appearance.   HENT:      Head: Normocephalic and atraumatic.      Right Ear: Hearing normal.      Left Ear: Hearing normal.      Nose: Nose normal.   Eyes:      General: Lids are normal. Vision grossly intact.      Extraocular Movements: Extraocular movements intact.      Conjunctiva/sclera: Conjunctivae normal.   Cardiovascular:      Rate and Rhythm: Normal rate and regular rhythm.      Pulses: Normal pulses.      Heart sounds: Normal heart sounds.   Pulmonary:      Effort: Pulmonary effort is normal.      Breath sounds: No  wheezing, rhonchi or rales.   Musculoskeletal:      Cervical back: Full passive range of motion without pain.      Right lower leg: Edema present.      Left lower leg: Edema present.   Skin:     General: Skin is warm.   Neurological:      General: No focal deficit present.      Mental Status: He is alert and oriented to person, place, and time.   Psychiatric:         Attention and Perception: Attention normal.         Mood and Affect: Mood and affect normal.         Behavior: Behavior is cooperative.         LABS AND IMAGING REVIEWED IN EPIC          Assessment:     1.  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  Vitamin B12 deficiency  3.  Monoclonal B-cell population on flow cytometry from bone marrow biopsy from September 2020, this involves 11% of the bone marrow, repeat bone marrow showed less than 1% involvement  4. Abnormal weight loss  5. Constipation and bowel habit irregularities         Plan:       Patient is status post splenectomy for splenomegaly.  Pathology negative for malignancy.  He does have this history of monoclonal B-cell population on bone marrow biopsy.    Small bowel mesentery lymph nodes are stable 4/2023.      Patient does have an elevated MCV and anemia, decided to repeat a bone marrow biopsy on 01/03/2024.  This revealed low risk MDS based off of IPSS-R score, cytogenetics showing 47,Y,idic(X)(q13),+8(20)    Retacrit every 2 weeks PRN    Return to clinic in 12 weeks        SHEA Garcia

## 2024-06-11 ENCOUNTER — LAB VISIT (OUTPATIENT)
Dept: LAB | Facility: HOSPITAL | Age: 79
End: 2024-06-11
Attending: INTERNAL MEDICINE
Payer: MEDICARE

## 2024-06-11 ENCOUNTER — INFUSION (OUTPATIENT)
Dept: INFUSION THERAPY | Facility: HOSPITAL | Age: 79
End: 2024-06-11
Attending: INTERNAL MEDICINE
Payer: MEDICARE

## 2024-06-11 VITALS
HEART RATE: 69 BPM | BODY MASS INDEX: 25.39 KG/M2 | HEIGHT: 74 IN | OXYGEN SATURATION: 96 % | RESPIRATION RATE: 16 BRPM | SYSTOLIC BLOOD PRESSURE: 143 MMHG | DIASTOLIC BLOOD PRESSURE: 70 MMHG | WEIGHT: 197.81 LBS | TEMPERATURE: 98 F

## 2024-06-11 DIAGNOSIS — D61.818 PANCYTOPENIA: ICD-10-CM

## 2024-06-11 DIAGNOSIS — D46.Z MYELODYSPLASTIC SYNDROME, LOW GRADE: Primary | ICD-10-CM

## 2024-06-11 DIAGNOSIS — C43.59 MALIGNANT MELANOMA OF OTHER PART OF TRUNK: ICD-10-CM

## 2024-06-11 DIAGNOSIS — D46.Z MYELODYSPLASTIC SYNDROME, LOW GRADE: ICD-10-CM

## 2024-06-11 LAB
BASOPHILS # BLD AUTO: 0.04 X10(3)/MCL
BASOPHILS NFR BLD AUTO: 1 %
EOSINOPHIL # BLD AUTO: 0 X10(3)/MCL (ref 0–0.9)
EOSINOPHIL NFR BLD AUTO: 0 %
ERYTHROCYTE [DISTWIDTH] IN BLOOD BY AUTOMATED COUNT: 21 % (ref 11.5–17)
HCT VFR BLD AUTO: 30 % (ref 42–52)
HGB BLD-MCNC: 9.2 G/DL (ref 14–18)
IMM GRANULOCYTES # BLD AUTO: 0.34 X10(3)/MCL (ref 0–0.04)
IMM GRANULOCYTES NFR BLD AUTO: 8.6 %
LYMPHOCYTES # BLD AUTO: 1.87 X10(3)/MCL (ref 0.6–4.6)
LYMPHOCYTES NFR BLD AUTO: 47.2 %
MCH RBC QN AUTO: 41.3 PG (ref 27–31)
MCHC RBC AUTO-ENTMCNC: 30.7 G/DL (ref 33–36)
MCV RBC AUTO: 134.5 FL (ref 80–94)
MONOCYTES # BLD AUTO: 0.68 X10(3)/MCL (ref 0.1–1.3)
MONOCYTES NFR BLD AUTO: 17.2 %
NEUTROPHILS # BLD AUTO: 1.03 X10(3)/MCL (ref 2.1–9.2)
NEUTROPHILS NFR BLD AUTO: 26 %
PLATELET # BLD AUTO: 173 X10(3)/MCL (ref 130–400)
PMV BLD AUTO: 11.1 FL (ref 7.4–10.4)
RBC # BLD AUTO: 2.23 X10(6)/MCL (ref 4.7–6.1)
WBC # SPEC AUTO: 3.96 X10(3)/MCL (ref 4.5–11.5)

## 2024-06-11 PROCEDURE — 96372 THER/PROPH/DIAG INJ SC/IM: CPT

## 2024-06-11 PROCEDURE — 85025 COMPLETE CBC W/AUTO DIFF WBC: CPT

## 2024-06-11 PROCEDURE — 36415 COLL VENOUS BLD VENIPUNCTURE: CPT

## 2024-06-11 PROCEDURE — 63600175 PHARM REV CODE 636 W HCPCS: Mod: JZ,EC,JG | Performed by: INTERNAL MEDICINE

## 2024-06-11 RX ADMIN — EPOETIN ALFA-EPBX 40000 UNITS: 40000 INJECTION, SOLUTION INTRAVENOUS; SUBCUTANEOUS at 03:06

## 2024-06-25 ENCOUNTER — INFUSION (OUTPATIENT)
Dept: INFUSION THERAPY | Facility: HOSPITAL | Age: 79
End: 2024-06-25
Attending: INTERNAL MEDICINE
Payer: MEDICARE

## 2024-06-25 ENCOUNTER — LAB VISIT (OUTPATIENT)
Dept: LAB | Facility: HOSPITAL | Age: 79
End: 2024-06-25
Attending: INTERNAL MEDICINE
Payer: MEDICARE

## 2024-06-25 VITALS
DIASTOLIC BLOOD PRESSURE: 70 MMHG | HEART RATE: 70 BPM | SYSTOLIC BLOOD PRESSURE: 138 MMHG | RESPIRATION RATE: 18 BRPM | TEMPERATURE: 98 F | OXYGEN SATURATION: 95 %

## 2024-06-25 DIAGNOSIS — D61.818 PANCYTOPENIA: ICD-10-CM

## 2024-06-25 DIAGNOSIS — D46.Z MYELODYSPLASTIC SYNDROME, LOW GRADE: Primary | ICD-10-CM

## 2024-06-25 DIAGNOSIS — C43.59 MALIGNANT MELANOMA OF OTHER PART OF TRUNK: ICD-10-CM

## 2024-06-25 DIAGNOSIS — D46.Z MYELODYSPLASTIC SYNDROME, LOW GRADE: ICD-10-CM

## 2024-06-25 LAB
ALBUMIN SERPL-MCNC: 3.5 G/DL (ref 3.4–4.8)
ALBUMIN/GLOB SERPL: 0.9 RATIO (ref 1.1–2)
ALP SERPL-CCNC: 126 UNIT/L (ref 40–150)
ALT SERPL-CCNC: 11 UNIT/L (ref 0–55)
ANION GAP SERPL CALC-SCNC: 8 MEQ/L
AST SERPL-CCNC: 19 UNIT/L (ref 5–34)
BASOPHILS # BLD AUTO: 0.04 X10(3)/MCL
BASOPHILS NFR BLD AUTO: 1 %
BILIRUB SERPL-MCNC: 0.6 MG/DL
BUN SERPL-MCNC: 19.9 MG/DL (ref 8.4–25.7)
CALCIUM SERPL-MCNC: 8.9 MG/DL (ref 8.8–10)
CHLORIDE SERPL-SCNC: 99 MMOL/L (ref 98–107)
CO2 SERPL-SCNC: 29 MMOL/L (ref 23–31)
CREAT SERPL-MCNC: 0.92 MG/DL (ref 0.73–1.18)
CREAT/UREA NIT SERPL: 22
EOSINOPHIL # BLD AUTO: 0 X10(3)/MCL (ref 0–0.9)
EOSINOPHIL NFR BLD AUTO: 0 %
ERYTHROCYTE [DISTWIDTH] IN BLOOD BY AUTOMATED COUNT: 20.7 % (ref 11.5–17)
GFR SERPLBLD CREATININE-BSD FMLA CKD-EPI: >60 ML/MIN/1.73/M2
GLOBULIN SER-MCNC: 3.9 GM/DL (ref 2.4–3.5)
GLUCOSE SERPL-MCNC: 176 MG/DL (ref 82–115)
HCT VFR BLD AUTO: 30.1 % (ref 42–52)
HGB BLD-MCNC: 9.1 G/DL (ref 14–18)
IMM GRANULOCYTES # BLD AUTO: 0.43 X10(3)/MCL (ref 0–0.04)
IMM GRANULOCYTES NFR BLD AUTO: 11.2 %
LYMPHOCYTES # BLD AUTO: 1.98 X10(3)/MCL (ref 0.6–4.6)
LYMPHOCYTES NFR BLD AUTO: 51.6 %
MCH RBC QN AUTO: 41.7 PG (ref 27–31)
MCHC RBC AUTO-ENTMCNC: 30.2 G/DL (ref 33–36)
MCV RBC AUTO: 138.1 FL (ref 80–94)
MONOCYTES # BLD AUTO: 0.71 X10(3)/MCL (ref 0.1–1.3)
MONOCYTES NFR BLD AUTO: 18.5 %
NEUTROPHILS # BLD AUTO: 0.68 X10(3)/MCL (ref 2.1–9.2)
NEUTROPHILS NFR BLD AUTO: 17.7 %
PLATELET # BLD AUTO: 149 X10(3)/MCL (ref 130–400)
PMV BLD AUTO: 11.6 FL (ref 7.4–10.4)
POTASSIUM SERPL-SCNC: 4.5 MMOL/L (ref 3.5–5.1)
PROT SERPL-MCNC: 7.4 GM/DL (ref 5.8–7.6)
RBC # BLD AUTO: 2.18 X10(6)/MCL (ref 4.7–6.1)
SODIUM SERPL-SCNC: 136 MMOL/L (ref 136–145)
WBC # BLD AUTO: 3.84 X10(3)/MCL (ref 4.5–11.5)

## 2024-06-25 PROCEDURE — 80053 COMPREHEN METABOLIC PANEL: CPT

## 2024-06-25 PROCEDURE — 36415 COLL VENOUS BLD VENIPUNCTURE: CPT

## 2024-06-25 PROCEDURE — 85025 COMPLETE CBC W/AUTO DIFF WBC: CPT

## 2024-06-25 PROCEDURE — 63600175 PHARM REV CODE 636 W HCPCS: Mod: JZ,EC,JG | Performed by: NURSE PRACTITIONER

## 2024-06-25 PROCEDURE — 96372 THER/PROPH/DIAG INJ SC/IM: CPT

## 2024-06-25 RX ADMIN — EPOETIN ALFA-EPBX 40000 UNITS: 40000 INJECTION, SOLUTION INTRAVENOUS; SUBCUTANEOUS at 03:06

## 2024-07-09 ENCOUNTER — INFUSION (OUTPATIENT)
Dept: INFUSION THERAPY | Facility: HOSPITAL | Age: 79
End: 2024-07-09
Attending: INTERNAL MEDICINE
Payer: MEDICARE

## 2024-07-09 ENCOUNTER — LAB VISIT (OUTPATIENT)
Dept: LAB | Facility: HOSPITAL | Age: 79
End: 2024-07-09
Attending: INTERNAL MEDICINE
Payer: MEDICARE

## 2024-07-09 VITALS — HEART RATE: 70 BPM | TEMPERATURE: 96 F | SYSTOLIC BLOOD PRESSURE: 117 MMHG | DIASTOLIC BLOOD PRESSURE: 68 MMHG

## 2024-07-09 DIAGNOSIS — D46.Z MYELODYSPLASTIC SYNDROME, LOW GRADE: ICD-10-CM

## 2024-07-09 DIAGNOSIS — D61.818 PANCYTOPENIA: ICD-10-CM

## 2024-07-09 DIAGNOSIS — C43.59 MALIGNANT MELANOMA OF OTHER PART OF TRUNK: ICD-10-CM

## 2024-07-09 DIAGNOSIS — D46.Z MYELODYSPLASTIC SYNDROME, LOW GRADE: Primary | ICD-10-CM

## 2024-07-09 LAB
BASOPHILS # BLD AUTO: 0.05 X10(3)/MCL
BASOPHILS NFR BLD AUTO: 1 %
EOSINOPHIL # BLD AUTO: 0 X10(3)/MCL (ref 0–0.9)
EOSINOPHIL NFR BLD AUTO: 0 %
ERYTHROCYTE [DISTWIDTH] IN BLOOD BY AUTOMATED COUNT: 20.2 % (ref 11.5–17)
HCT VFR BLD AUTO: 28.5 % (ref 42–52)
HGB BLD-MCNC: 8.7 G/DL (ref 14–18)
IMM GRANULOCYTES # BLD AUTO: 0.27 X10(3)/MCL (ref 0–0.04)
IMM GRANULOCYTES NFR BLD AUTO: 5.5 %
LYMPHOCYTES # BLD AUTO: 2.66 X10(3)/MCL (ref 0.6–4.6)
LYMPHOCYTES NFR BLD AUTO: 54.6 %
MCH RBC QN AUTO: 43.1 PG (ref 27–31)
MCHC RBC AUTO-ENTMCNC: 30.5 G/DL (ref 33–36)
MCV RBC AUTO: 141.1 FL (ref 80–94)
MONOCYTES # BLD AUTO: 0.76 X10(3)/MCL (ref 0.1–1.3)
MONOCYTES NFR BLD AUTO: 15.6 %
NEUTROPHILS # BLD AUTO: 1.13 X10(3)/MCL (ref 2.1–9.2)
NEUTROPHILS NFR BLD AUTO: 23.3 %
PLATELET # BLD AUTO: 159 X10(3)/MCL (ref 130–400)
PMV BLD AUTO: 11.9 FL (ref 7.4–10.4)
RBC # BLD AUTO: 2.02 X10(6)/MCL (ref 4.7–6.1)
WBC # BLD AUTO: 4.87 X10(3)/MCL (ref 4.5–11.5)

## 2024-07-09 PROCEDURE — 63600175 PHARM REV CODE 636 W HCPCS: Mod: JZ,EC,JG | Performed by: INTERNAL MEDICINE

## 2024-07-09 PROCEDURE — 85025 COMPLETE CBC W/AUTO DIFF WBC: CPT

## 2024-07-09 PROCEDURE — 36415 COLL VENOUS BLD VENIPUNCTURE: CPT

## 2024-07-09 PROCEDURE — 96372 THER/PROPH/DIAG INJ SC/IM: CPT

## 2024-07-09 RX ADMIN — EPOETIN ALFA-EPBX 40000 UNITS: 40000 INJECTION, SOLUTION INTRAVENOUS; SUBCUTANEOUS at 03:07

## 2024-07-23 ENCOUNTER — LAB VISIT (OUTPATIENT)
Dept: LAB | Facility: HOSPITAL | Age: 79
End: 2024-07-23
Attending: INTERNAL MEDICINE
Payer: MEDICARE

## 2024-07-23 ENCOUNTER — INFUSION (OUTPATIENT)
Dept: INFUSION THERAPY | Facility: HOSPITAL | Age: 79
End: 2024-07-23
Attending: INTERNAL MEDICINE
Payer: MEDICARE

## 2024-07-23 DIAGNOSIS — D61.818 PANCYTOPENIA: ICD-10-CM

## 2024-07-23 DIAGNOSIS — D46.Z MYELODYSPLASTIC SYNDROME, LOW GRADE: ICD-10-CM

## 2024-07-23 DIAGNOSIS — D46.Z MYELODYSPLASTIC SYNDROME, LOW GRADE: Primary | ICD-10-CM

## 2024-07-23 DIAGNOSIS — C43.59 MALIGNANT MELANOMA OF OTHER PART OF TRUNK: ICD-10-CM

## 2024-07-23 LAB
ALBUMIN SERPL-MCNC: 3.5 G/DL (ref 3.4–4.8)
ALBUMIN/GLOB SERPL: 0.9 RATIO (ref 1.1–2)
ALP SERPL-CCNC: 117 UNIT/L (ref 40–150)
ALT SERPL-CCNC: 9 UNIT/L (ref 0–55)
ANION GAP SERPL CALC-SCNC: 7 MEQ/L
AST SERPL-CCNC: 15 UNIT/L (ref 5–34)
BASOPHILS # BLD AUTO: 0.03 X10(3)/MCL
BASOPHILS NFR BLD AUTO: 0.6 %
BILIRUB SERPL-MCNC: 0.7 MG/DL
BUN SERPL-MCNC: 20.8 MG/DL (ref 8.4–25.7)
CALCIUM SERPL-MCNC: 8.7 MG/DL (ref 8.8–10)
CHLORIDE SERPL-SCNC: 102 MMOL/L (ref 98–107)
CO2 SERPL-SCNC: 28 MMOL/L (ref 23–31)
CREAT SERPL-MCNC: 1.04 MG/DL (ref 0.73–1.18)
CREAT/UREA NIT SERPL: 20
EOSINOPHIL # BLD AUTO: 0 X10(3)/MCL (ref 0–0.9)
EOSINOPHIL NFR BLD AUTO: 0 %
ERYTHROCYTE [DISTWIDTH] IN BLOOD BY AUTOMATED COUNT: 18.8 % (ref 11.5–17)
GFR SERPLBLD CREATININE-BSD FMLA CKD-EPI: >60 ML/MIN/1.73/M2
GLOBULIN SER-MCNC: 3.8 GM/DL (ref 2.4–3.5)
GLUCOSE SERPL-MCNC: 194 MG/DL (ref 82–115)
HCT VFR BLD AUTO: 28.7 % (ref 42–52)
HGB BLD-MCNC: 8.8 G/DL (ref 14–18)
IMM GRANULOCYTES # BLD AUTO: 0.41 X10(3)/MCL (ref 0–0.04)
IMM GRANULOCYTES NFR BLD AUTO: 8.6 %
LYMPHOCYTES # BLD AUTO: 2.66 X10(3)/MCL (ref 0.6–4.6)
LYMPHOCYTES NFR BLD AUTO: 56 %
MCH RBC QN AUTO: 44.2 PG (ref 27–31)
MCHC RBC AUTO-ENTMCNC: 30.7 G/DL (ref 33–36)
MCV RBC AUTO: 144.2 FL (ref 80–94)
MONOCYTES # BLD AUTO: 0.55 X10(3)/MCL (ref 0.1–1.3)
MONOCYTES NFR BLD AUTO: 11.6 %
NEUTROPHILS # BLD AUTO: 1.1 X10(3)/MCL (ref 2.1–9.2)
NEUTROPHILS NFR BLD AUTO: 23.2 %
PLATELET # BLD AUTO: 132 X10(3)/MCL (ref 130–400)
PMV BLD AUTO: 11.9 FL (ref 7.4–10.4)
POTASSIUM SERPL-SCNC: 4.4 MMOL/L (ref 3.5–5.1)
PROT SERPL-MCNC: 7.3 GM/DL (ref 5.8–7.6)
RBC # BLD AUTO: 1.99 X10(6)/MCL (ref 4.7–6.1)
SODIUM SERPL-SCNC: 137 MMOL/L (ref 136–145)
WBC # BLD AUTO: 4.75 X10(3)/MCL (ref 4.5–11.5)

## 2024-07-23 PROCEDURE — 36415 COLL VENOUS BLD VENIPUNCTURE: CPT

## 2024-07-23 PROCEDURE — 85025 COMPLETE CBC W/AUTO DIFF WBC: CPT

## 2024-07-23 PROCEDURE — 80053 COMPREHEN METABOLIC PANEL: CPT

## 2024-07-23 PROCEDURE — 96372 THER/PROPH/DIAG INJ SC/IM: CPT

## 2024-07-23 PROCEDURE — 63600175 PHARM REV CODE 636 W HCPCS: Mod: JZ,EC,JG | Performed by: INTERNAL MEDICINE

## 2024-07-23 RX ADMIN — EPOETIN ALFA-EPBX 40000 UNITS: 40000 INJECTION, SOLUTION INTRAVENOUS; SUBCUTANEOUS at 03:07

## 2024-08-06 ENCOUNTER — INFUSION (OUTPATIENT)
Dept: INFUSION THERAPY | Facility: HOSPITAL | Age: 79
End: 2024-08-06
Attending: INTERNAL MEDICINE
Payer: MEDICARE

## 2024-08-06 ENCOUNTER — LAB VISIT (OUTPATIENT)
Dept: LAB | Facility: HOSPITAL | Age: 79
End: 2024-08-06
Attending: INTERNAL MEDICINE
Payer: MEDICARE

## 2024-08-06 VITALS
SYSTOLIC BLOOD PRESSURE: 124 MMHG | DIASTOLIC BLOOD PRESSURE: 62 MMHG | RESPIRATION RATE: 18 BRPM | WEIGHT: 197.81 LBS | HEIGHT: 74 IN | HEART RATE: 76 BPM | BODY MASS INDEX: 25.39 KG/M2

## 2024-08-06 DIAGNOSIS — D46.Z MYELODYSPLASTIC SYNDROME, LOW GRADE: Primary | ICD-10-CM

## 2024-08-06 DIAGNOSIS — D61.818 PANCYTOPENIA: ICD-10-CM

## 2024-08-06 DIAGNOSIS — D46.Z MYELODYSPLASTIC SYNDROME, LOW GRADE: ICD-10-CM

## 2024-08-06 DIAGNOSIS — C43.59 MALIGNANT MELANOMA OF OTHER PART OF TRUNK: ICD-10-CM

## 2024-08-06 LAB
BASOPHILS # BLD AUTO: 0.05 X10(3)/MCL
BASOPHILS NFR BLD AUTO: 1 %
EOSINOPHIL # BLD AUTO: 0 X10(3)/MCL (ref 0–0.9)
EOSINOPHIL NFR BLD AUTO: 0 %
ERYTHROCYTE [DISTWIDTH] IN BLOOD BY AUTOMATED COUNT: 18 % (ref 11.5–17)
HCT VFR BLD AUTO: 30.2 % (ref 42–52)
HGB BLD-MCNC: 9.3 G/DL (ref 14–18)
IMM GRANULOCYTES # BLD AUTO: 0.47 X10(3)/MCL (ref 0–0.04)
IMM GRANULOCYTES NFR BLD AUTO: 9.5 %
LYMPHOCYTES # BLD AUTO: 2.59 X10(3)/MCL (ref 0.6–4.6)
LYMPHOCYTES NFR BLD AUTO: 52.5 %
MCH RBC QN AUTO: 44.9 PG (ref 27–31)
MCHC RBC AUTO-ENTMCNC: 30.8 G/DL (ref 33–36)
MCV RBC AUTO: 145.9 FL (ref 80–94)
MONOCYTES # BLD AUTO: 0.6 X10(3)/MCL (ref 0.1–1.3)
MONOCYTES NFR BLD AUTO: 12.2 %
NEUTROPHILS # BLD AUTO: 1.22 X10(3)/MCL (ref 2.1–9.2)
NEUTROPHILS NFR BLD AUTO: 24.8 %
PLATELET # BLD AUTO: 116 X10(3)/MCL (ref 130–400)
PMV BLD AUTO: 11.7 FL (ref 7.4–10.4)
RBC # BLD AUTO: 2.07 X10(6)/MCL (ref 4.7–6.1)
WBC # BLD AUTO: 4.93 X10(3)/MCL (ref 4.5–11.5)

## 2024-08-06 PROCEDURE — 85025 COMPLETE CBC W/AUTO DIFF WBC: CPT

## 2024-08-06 PROCEDURE — 96372 THER/PROPH/DIAG INJ SC/IM: CPT

## 2024-08-06 PROCEDURE — 36415 COLL VENOUS BLD VENIPUNCTURE: CPT

## 2024-08-06 PROCEDURE — 63600175 PHARM REV CODE 636 W HCPCS: Mod: JZ,EC,JG | Performed by: NURSE PRACTITIONER

## 2024-08-06 RX ADMIN — EPOETIN ALFA-EPBX 40000 UNITS: 40000 INJECTION, SOLUTION INTRAVENOUS; SUBCUTANEOUS at 01:08

## 2024-09-03 ENCOUNTER — INFUSION (OUTPATIENT)
Dept: INFUSION THERAPY | Facility: HOSPITAL | Age: 79
End: 2024-09-03
Attending: INTERNAL MEDICINE
Payer: MEDICARE

## 2024-09-03 ENCOUNTER — LAB VISIT (OUTPATIENT)
Dept: LAB | Facility: HOSPITAL | Age: 79
End: 2024-09-03
Attending: NURSE PRACTITIONER
Payer: MEDICARE

## 2024-09-03 VITALS
TEMPERATURE: 98 F | HEIGHT: 74 IN | HEART RATE: 85 BPM | OXYGEN SATURATION: 90 % | SYSTOLIC BLOOD PRESSURE: 111 MMHG | BODY MASS INDEX: 24.85 KG/M2 | WEIGHT: 193.63 LBS | RESPIRATION RATE: 18 BRPM | DIASTOLIC BLOOD PRESSURE: 59 MMHG

## 2024-09-03 DIAGNOSIS — D61.818 PANCYTOPENIA: ICD-10-CM

## 2024-09-03 DIAGNOSIS — D46.Z MYELODYSPLASTIC SYNDROME, LOW GRADE: Primary | ICD-10-CM

## 2024-09-03 DIAGNOSIS — C43.59 MALIGNANT MELANOMA OF OTHER PART OF TRUNK: ICD-10-CM

## 2024-09-03 DIAGNOSIS — D46.Z MYELODYSPLASTIC SYNDROME, LOW GRADE: ICD-10-CM

## 2024-09-03 LAB
ABS NEUT CALC (OHS): 7.66 X10(3)/MCL (ref 2.1–9.2)
ALBUMIN SERPL-MCNC: 3.4 G/DL (ref 3.4–4.8)
ALBUMIN/GLOB SERPL: 0.8 RATIO (ref 1.1–2)
ALP SERPL-CCNC: 123 UNIT/L (ref 40–150)
ALT SERPL-CCNC: 8 UNIT/L (ref 0–55)
ANION GAP SERPL CALC-SCNC: 12 MEQ/L
ANISOCYTOSIS BLD QL SMEAR: ABNORMAL
AST SERPL-CCNC: 20 UNIT/L (ref 5–34)
BILIRUB SERPL-MCNC: 0.6 MG/DL
BUN SERPL-MCNC: 20.1 MG/DL (ref 8.4–25.7)
CALCIUM SERPL-MCNC: 8.6 MG/DL (ref 8.8–10)
CHLORIDE SERPL-SCNC: 100 MMOL/L (ref 98–107)
CO2 SERPL-SCNC: 26 MMOL/L (ref 23–31)
CREAT SERPL-MCNC: 0.98 MG/DL (ref 0.73–1.18)
CREAT/UREA NIT SERPL: 21
ERYTHROCYTE [DISTWIDTH] IN BLOOD BY AUTOMATED COUNT: 18.5 % (ref 11.5–17)
GFR SERPLBLD CREATININE-BSD FMLA CKD-EPI: >60 ML/MIN/1.73/M2
GIANT PLATELETS: ABNORMAL
GLOBULIN SER-MCNC: 4.1 GM/DL (ref 2.4–3.5)
GLUCOSE SERPL-MCNC: 166 MG/DL (ref 82–115)
HCT VFR BLD AUTO: 27.4 % (ref 42–52)
HGB BLD-MCNC: 8.3 G/DL (ref 14–18)
HOWELL JOLLY BODIES (OLG): ABNORMAL
LYMPHOCYTES NFR BLD MANUAL: 31 % (ref 13–40)
LYMPHOCYTES NFR BLD MANUAL: 5.52 X10(3)/MCL
MACROCYTES BLD QL SMEAR: ABNORMAL
MCH RBC QN AUTO: 44.9 PG (ref 27–31)
MCHC RBC AUTO-ENTMCNC: 30.3 G/DL (ref 33–36)
MCV RBC AUTO: 148.1 FL (ref 80–94)
METAMYELOCYTES NFR BLD MANUAL: 3 %
MONOCYTES NFR BLD MANUAL: 14 % (ref 2–11)
MONOCYTES NFR BLD MANUAL: 2.49 X10(3)/MCL (ref 0.1–1.3)
MYELOCYTES NFR BLD MANUAL: 9 %
NEUTROPHILS NFR BLD MANUAL: 43 % (ref 47–80)
PLATELET # BLD AUTO: 110 X10(3)/MCL (ref 130–400)
PLATELET # BLD EST: ADEQUATE 10*3/UL
PMV BLD AUTO: 12.4 FL (ref 7.4–10.4)
POIKILOCYTOSIS BLD QL SMEAR: ABNORMAL
POLYCHROMASIA BLD QL SMEAR: ABNORMAL
POTASSIUM SERPL-SCNC: 4.5 MMOL/L (ref 3.5–5.1)
PROT SERPL-MCNC: 7.5 GM/DL (ref 5.8–7.6)
RBC # BLD AUTO: 1.85 X10(6)/MCL (ref 4.7–6.1)
RBC MORPH BLD: ABNORMAL
SODIUM SERPL-SCNC: 138 MMOL/L (ref 136–145)
STIPPLED RBC (OHS): SLIGHT
WBC # BLD AUTO: 17.82 X10(3)/MCL (ref 4.5–11.5)

## 2024-09-03 PROCEDURE — 85027 COMPLETE CBC AUTOMATED: CPT

## 2024-09-03 PROCEDURE — 63600175 PHARM REV CODE 636 W HCPCS: Mod: JZ,EC,JG | Performed by: INTERNAL MEDICINE

## 2024-09-03 PROCEDURE — 96372 THER/PROPH/DIAG INJ SC/IM: CPT

## 2024-09-03 PROCEDURE — 85007 BL SMEAR W/DIFF WBC COUNT: CPT

## 2024-09-03 PROCEDURE — 36415 COLL VENOUS BLD VENIPUNCTURE: CPT

## 2024-09-03 PROCEDURE — 80053 COMPREHEN METABOLIC PANEL: CPT

## 2024-09-03 RX ADMIN — EPOETIN ALFA-EPBX 40000 UNITS: 40000 INJECTION, SOLUTION INTRAVENOUS; SUBCUTANEOUS at 11:09

## 2024-09-03 NOTE — PLAN OF CARE
Retacrit q2w given for Hgb 8.3 on today's CBC. Tolerated well. Next appts confirmed with pt. Dc'd home in stable condition.

## 2024-09-17 ENCOUNTER — OFFICE VISIT (OUTPATIENT)
Dept: HEMATOLOGY/ONCOLOGY | Facility: CLINIC | Age: 79
End: 2024-09-17
Payer: MEDICARE

## 2024-09-17 ENCOUNTER — LAB VISIT (OUTPATIENT)
Dept: LAB | Facility: HOSPITAL | Age: 79
End: 2024-09-17
Attending: NURSE PRACTITIONER
Payer: MEDICARE

## 2024-09-17 ENCOUNTER — INFUSION (OUTPATIENT)
Dept: INFUSION THERAPY | Facility: HOSPITAL | Age: 79
End: 2024-09-17
Attending: INTERNAL MEDICINE
Payer: MEDICARE

## 2024-09-17 VITALS
SYSTOLIC BLOOD PRESSURE: 125 MMHG | RESPIRATION RATE: 15 BRPM | BODY MASS INDEX: 25.07 KG/M2 | WEIGHT: 195.31 LBS | OXYGEN SATURATION: 95 % | DIASTOLIC BLOOD PRESSURE: 64 MMHG | HEIGHT: 74 IN | HEART RATE: 71 BPM | TEMPERATURE: 98 F

## 2024-09-17 DIAGNOSIS — D46.Z MYELODYSPLASTIC SYNDROME, LOW GRADE: Primary | ICD-10-CM

## 2024-09-17 DIAGNOSIS — C43.59 MALIGNANT MELANOMA OF OTHER PART OF TRUNK: ICD-10-CM

## 2024-09-17 DIAGNOSIS — D61.818 PANCYTOPENIA: ICD-10-CM

## 2024-09-17 DIAGNOSIS — D46.Z MYELODYSPLASTIC SYNDROME, LOW GRADE: ICD-10-CM

## 2024-09-17 LAB
ALBUMIN SERPL-MCNC: 3.4 G/DL (ref 3.4–4.8)
ALBUMIN/GLOB SERPL: 0.9 RATIO (ref 1.1–2)
ALP SERPL-CCNC: 109 UNIT/L (ref 40–150)
ALT SERPL-CCNC: 8 UNIT/L (ref 0–55)
ANION GAP SERPL CALC-SCNC: 10 MEQ/L
AST SERPL-CCNC: 14 UNIT/L (ref 5–34)
BASOPHILS # BLD AUTO: 0.12 X10(3)/MCL
BASOPHILS NFR BLD AUTO: 1 %
BILIRUB SERPL-MCNC: 0.8 MG/DL
BUN SERPL-MCNC: 23.7 MG/DL (ref 8.4–25.7)
CALCIUM SERPL-MCNC: 8.4 MG/DL (ref 8.8–10)
CHLORIDE SERPL-SCNC: 103 MMOL/L (ref 98–107)
CO2 SERPL-SCNC: 26 MMOL/L (ref 23–31)
CREAT SERPL-MCNC: 0.92 MG/DL (ref 0.73–1.18)
CREAT/UREA NIT SERPL: 26
EOSINOPHIL # BLD AUTO: 0 X10(3)/MCL (ref 0–0.9)
EOSINOPHIL NFR BLD AUTO: 0 %
ERYTHROCYTE [DISTWIDTH] IN BLOOD BY AUTOMATED COUNT: 18.7 % (ref 11.5–17)
GFR SERPLBLD CREATININE-BSD FMLA CKD-EPI: >60 ML/MIN/1.73/M2
GLOBULIN SER-MCNC: 3.7 GM/DL (ref 2.4–3.5)
GLUCOSE SERPL-MCNC: 158 MG/DL (ref 82–115)
HCT VFR BLD AUTO: 27.6 % (ref 42–52)
HGB BLD-MCNC: 8.3 G/DL (ref 14–18)
IMM GRANULOCYTES # BLD AUTO: 1.48 X10(3)/MCL (ref 0–0.04)
IMM GRANULOCYTES NFR BLD AUTO: 12.8 %
LYMPHOCYTES # BLD AUTO: 4.69 X10(3)/MCL (ref 0.6–4.6)
LYMPHOCYTES NFR BLD AUTO: 40.5 %
MCH RBC QN AUTO: 44.6 PG (ref 27–31)
MCHC RBC AUTO-ENTMCNC: 30.1 G/DL (ref 33–36)
MCV RBC AUTO: 148.4 FL (ref 80–94)
MONOCYTES # BLD AUTO: 2.38 X10(3)/MCL (ref 0.1–1.3)
MONOCYTES NFR BLD AUTO: 20.6 %
NEUTROPHILS # BLD AUTO: 2.91 X10(3)/MCL (ref 2.1–9.2)
NEUTROPHILS NFR BLD AUTO: 25.1 %
PLATELET # BLD AUTO: 110 X10(3)/MCL (ref 130–400)
PMV BLD AUTO: 13 FL (ref 7.4–10.4)
POTASSIUM SERPL-SCNC: 4.2 MMOL/L (ref 3.5–5.1)
PROT SERPL-MCNC: 7.1 GM/DL (ref 5.8–7.6)
RBC # BLD AUTO: 1.86 X10(6)/MCL (ref 4.7–6.1)
SODIUM SERPL-SCNC: 139 MMOL/L (ref 136–145)
WBC # BLD AUTO: 11.58 X10(3)/MCL (ref 4.5–11.5)

## 2024-09-17 PROCEDURE — 80053 COMPREHEN METABOLIC PANEL: CPT

## 2024-09-17 PROCEDURE — 36415 COLL VENOUS BLD VENIPUNCTURE: CPT

## 2024-09-17 PROCEDURE — 96372 THER/PROPH/DIAG INJ SC/IM: CPT

## 2024-09-17 PROCEDURE — 99214 OFFICE O/P EST MOD 30 MIN: CPT | Mod: S$PBB,,, | Performed by: INTERNAL MEDICINE

## 2024-09-17 PROCEDURE — 85025 COMPLETE CBC W/AUTO DIFF WBC: CPT

## 2024-09-17 PROCEDURE — 99215 OFFICE O/P EST HI 40 MIN: CPT | Mod: PBBFAC | Performed by: INTERNAL MEDICINE

## 2024-09-17 PROCEDURE — 63600175 PHARM REV CODE 636 W HCPCS: Mod: JZ,EC,JG | Performed by: NURSE PRACTITIONER

## 2024-09-17 PROCEDURE — 99999 PR PBB SHADOW E&M-EST. PATIENT-LVL V: CPT | Mod: PBBFAC,,, | Performed by: INTERNAL MEDICINE

## 2024-09-17 RX ORDER — ALBUTEROL SULFATE 90 UG/1
2 INHALANT RESPIRATORY (INHALATION) EVERY 6 HOURS PRN
COMMUNITY
Start: 2024-08-08

## 2024-09-17 RX ADMIN — EPOETIN ALFA-EPBX 40000 UNITS: 40000 INJECTION, SOLUTION INTRAVENOUS; SUBCUTANEOUS at 11:09

## 2024-09-17 NOTE — PROGRESS NOTES
"Subjective:       Patient ID: Israel Ibarra is a 78 y.o. male.    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  Repeat bone marrow biopsy on 4/6/2021 showed that the small kappa restricted B-cell population now accounts for less than 1% of the viable lymphocytes.    Current Treatment:    B-12 supplementation.   Epoetin every 2 weeks     Treatment History:  1.  Splenectomy    HPI:  Patient who was seen by Dr. Neal at [facility] for pancytopenia on 8/19/2020.  It was noted that the patient had leukopenia dating back to 2014.  He then began to have neutropenia and anemia in January 2020 and pancytopenia began in July 2020.  Peripheral blood smear on 7/8/2020 showed moderate macrocytic normochromic anemia, moderate leukopenia with absolute neutropenia with a few reactive lymphocytes.  There was mild thrombocytopenia with normal platelet morphology.  The patient then underwent a bone marrow biopsy on 9/8/2020, this showed a hypercellular bone marrow for age (50 to 60%) with trilineage hematopoiesis, lymphoid cells normal in number with few small atypical forms, flow cytometry revealed a small 11% monoclonal B-cell population, indeterminate for malignancy.  FISH was normal, cytogenetics showed 46,Y,inv(X)(p22.1q24)?c[20]. Further work-up was unrevealing occluding HIV negative, hepatitis A IgM negative, hepatitis B core IgM negative, hepatitis B surface antigen negative, hepatitis C antibody negative.  He was however found to be B12 deficient and started on B12 supplementation.  He continued to follow with Dr. Shin, most recently seen on 2/23/2021 via telemedicine.  Due to persistent pancytopenia with macrocytosis without improvement after B12 supplementation, it was recommended that he undergo repeat bone marrow biopsy and aspiration.  He then saw me " initially on 3/8/2021.  At that time, he had no major complaints other than easy bruising.    Repeat bone marrow biopsy on 4/6/2021 revealed normocellular bone marrow with trilineage hematopoiesis and maturation, no increase in lymphocytes, no dysplasia identified, small kappa restricted B-cell population now accounting for less than 1% of viable lymphocytes.  He did have an admission to the hospital on 4/15/2021 for neutropenic fever, no organism identified.  CT scan of the neck, chest, abdomen, and pelvis on 6/14/2021 showed no lymph nodes that are enlarged by size criteria in the neck, chest, abdomen, or pelvis.  There is splenomegaly with the spleen measuring 14.1 x 8.4 cm.  There are a few subcentimeter right sided lung nodules that are nonspecific, but most likely related to either an infectious or inflammatory process.  There was enlarged somewhat irregular prostate extending exophytically into the posterior aspect of the urinary bladder, prostatic malignancy is not excluded.  There is diffuse wall thickening of the urinary bladder that may be related to chronic outlet obstruction or cystitis.  Case discussed at [facility] tumor board on 6/25/2021, decision made to move forward with splenectomy.  Patient underwent planned therapeutic splenectomy on 9/15/2021, tolerated well. Pathology negative for malignancy.  Patient underwent a CT scan of the chest, abdomen, and pelvis on 07/01/2022 that revealed small lymph nodes in the small bowel mesentery that have increased in number and slightly enlarged.  Otherwise, no evidence of other pathology. CT scan of the chest, abdomen, and pelvis done on 10/27/2022 showed stable prominent mesenteric lymph nodes with no other abnormalities.  A repeat CT scan of the chest, abdomen, and pelvis on 04/06/2023 revealed unchanged appearance of prominent but not pathologically enlarged mesenteric lymph nodes with new small bilateral pleural effusions, mesenteric edema, and anasarca  consistent with fluid overload.  There was some gallbladder wall edema without gallbladder distention could be secondary to fluid status but acute cholecystitis could not be entirely excluded.    Repeat bone marrow biopsy done on 01/03/2024 due to persisting macrocytosis and anemia showed a hypercellular bone marrow (50%) with mild disc megakaryopoiesis and this erythropoiesis highly suspicious for MDS with cytogenetics showing 47,Y,idic(X)(q13),+8(20) and these changes are primarily associated with MDS.  His IPSS R score gets to points for intermediate cytogenetics and 1.4 a hemoglobin between 8 and 10 giving him a score of 3 which is low risk MDS.    Interval History:   Patient presents to clinic for scheduled follow up.  Overall, he is doing okay.   He did recently have a hospitalization at [facility] for COVID-19 infection.  He also continues to have recurrent pleural effusions.      Past Medical History:   Diagnosis Date    Diabetes mellitus     Enlarged prostate     Gout     HLD (hyperlipidemia)     [place] (hard of hearing)     Hypertension     Monoclonal B-cell lymphocytosis     Pancytopenia       Past Surgical History:   Procedure Laterality Date    BACK SURGERY      BACK SURGERY      BASAL CELL CARCINOMA EXCISION      head    BONE MARROW BIOPSY N/A 1/3/2024    Procedure: Biopsy-bone marrow;  Surgeon: Dylon Wheatley MD;  Location: [facility];  Service: General;  Laterality: N/A;    BONE MARROW BIOPSY W/ ASPIRATION  04/09/2021    CATARACT EXTRACTION, BILATERAL      CORONARY ARTERY BYPASS GRAFT (CABG)      LAPAROSCOPIC SPLENECTOMY      SHOULDER SURGERY Left     SHOULDER SURGERY Bilateral     SKIN CANCER EXCISION      on face    TONSILLECTOMY       Social History     Socioeconomic History    Marital status:    Tobacco Use    Smoking status: Former     Current packs/day: 0.00     Average packs/day: 1.3 packs/day for 12.0 years (15.0 ttl pk-yrs)     Types: Cigarettes     Start date: 1964     Quit date: 1976     Years  since quittin.7    Smokeless tobacco: Former    Tobacco comments:     patient stated that he quit at age 30   Substance and Sexual Activity    Alcohol use: Never    Drug use: Never     Social Determinants of Health     Financial Resource Strain: Low Risk  (2024)    [facility]    Overall Financial Resource Strain (CARDIA)     Difficulty of Paying Living Expenses: Not very hard   Food Insecurity: No Food Insecurity (2024)    [facility]    Hunger Vital Sign     Worried About Running Out of Food in the Last Year: Never true     Ran Out of Food in the Last Year: Never true   Transportation Needs: No Transportation Needs (2024)    [facility]    PRAPARE - Transportation     Lack of Transportation (Medical): No     Lack of Transportation (Non-Medical): No   Physical Activity: Unknown (2024)    [facility]    Exercise Vital Sign     Days of Exercise per Week: 0 days     Minutes of Exercise per Session: Patient declined   Stress: No Stress Concern Present (2024)    Received from [facility] and Affiliates    [place] of Occupational Health - Occupational Stress Questionnaire     Feeling of Stress : Only a little   Housing Stability: Low Risk  (2024)    Received from [email or web address] [facility] and Affiliates    Housing Stability Vital Sign     Unable to Pay for Housing in the Last Year: No     Number of Times Moved in the Last Year: 1     Homeless in the Last Year: No      Family History   Problem Relation Name Age of Onset    No  Known Problems Mother      Leukemia Father      Diabetes Sister      Hypertension Sister      Heart disease Sister      Diabetes Brother      Hypertension Brother      Melanoma Brother      Diabetes Sister      Hypertension Sister      Diabetes Sister      Hypertension Sister      Diabetes Sister      Hypertension Sister      Diabetes Sister      Hypertension Sister      Diabetes Brother      Hypertension Brother      Heart disease Brother        Review of patient's allergies indicates:  No Known Allergies   Review of Systems   Constitutional:  Positive for fatigue. Negative for appetite change, fever and unexpected weight change.   HENT:  Negative for nasal congestion, nosebleeds, sinus pressure/congestion and sore throat.    Eyes:  Negative for visual disturbance and eye dryness.   Respiratory:  Negative for chest tightness.    Cardiovascular:  Positive for leg swelling. Negative for chest pain.   Gastrointestinal:  Negative for abdominal pain, blood in stool and diarrhea.   Genitourinary:  Negative for difficulty urinating, frequency and hematuria.   Musculoskeletal:  Positive for back pain and leg pain.   Neurological:  Negative for dizziness, syncope, numbness and headaches.   Hematological:  Negative for adenopathy.   Psychiatric/Behavioral:  The patient is not nervous/anxious.          Objective:      Physical Exam  Vitals reviewed.   Constitutional:       General: He is awake.      Appearance: Normal appearance.   HENT:      Head: Normocephalic and atraumatic.      Right Ear: Hearing normal.      Left Ear: Hearing normal.      Nose: Nose normal.   Eyes:      General: Lids are normal. Vision grossly intact.      Extraocular Movements: Extraocular movements intact.      Conjunctiva/sclera: Conjunctivae normal.   Cardiovascular:      Rate and Rhythm: Normal rate and regular rhythm.      Pulses: Normal pulses.      Heart sounds: Normal heart sounds.   Pulmonary:      Effort: Pulmonary effort is normal.       Breath sounds: No wheezing, rhonchi or rales.   Musculoskeletal:      Cervical back: Full passive range of motion without pain.      Right lower leg: Edema present.      Left lower leg: Edema present.   Skin:     General: Skin is warm.   Neurological:      General: No focal deficit present.      Mental Status: He is alert and oriented to person, place, and time.   Psychiatric:         Attention and Perception: Attention normal.         Mood and Affect: Mood and affect normal.         Behavior: Behavior is cooperative.         LABS AND IMAGING REVIEWED IN EPIC          Assessment:     1.  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  Vitamin B12 deficiency  3.  Monoclonal B-cell population on flow cytometry from bone marrow biopsy from September 2020, this involves 11% of the bone marrow, repeat bone marrow showed less than 1% involvement  4. Abnormal weight loss  5. Constipation and bowel habit irregularities         Plan:       Patient is status post splenectomy for splenomegaly.  Pathology negative for malignancy.  He does have this history of monoclonal B-cell population on bone marrow biopsy.    Small bowel mesentery lymph nodes are stable 4/2023.      Patient does have an elevated MCV and anemia, decided to repeat a bone marrow biopsy on 01/03/2024.  This revealed low risk MDS based off of IPSS-R score, cytogenetics showing 47,Y,idic(X)(q13),+8(20)    Retacrit every 2 weeks PRN    Return to clinic in 4 weeks.  We will discuss luspatercept vs. Imetelstat.      Telly Mendoza II, MD

## 2024-10-01 ENCOUNTER — LAB VISIT (OUTPATIENT)
Dept: LAB | Facility: HOSPITAL | Age: 79
End: 2024-10-01
Attending: INTERNAL MEDICINE
Payer: MEDICARE

## 2024-10-01 ENCOUNTER — INFUSION (OUTPATIENT)
Dept: INFUSION THERAPY | Facility: HOSPITAL | Age: 79
End: 2024-10-01
Attending: INTERNAL MEDICINE
Payer: MEDICARE

## 2024-10-01 VITALS
DIASTOLIC BLOOD PRESSURE: 60 MMHG | SYSTOLIC BLOOD PRESSURE: 111 MMHG | TEMPERATURE: 98 F | RESPIRATION RATE: 18 BRPM | HEART RATE: 71 BPM

## 2024-10-01 DIAGNOSIS — C43.59 MALIGNANT MELANOMA OF OTHER PART OF TRUNK: ICD-10-CM

## 2024-10-01 DIAGNOSIS — D61.818 PANCYTOPENIA: ICD-10-CM

## 2024-10-01 DIAGNOSIS — D46.Z MYELODYSPLASTIC SYNDROME, LOW GRADE: Primary | ICD-10-CM

## 2024-10-01 DIAGNOSIS — D46.Z MYELODYSPLASTIC SYNDROME, LOW GRADE: ICD-10-CM

## 2024-10-01 LAB
ALBUMIN SERPL-MCNC: 3.4 G/DL (ref 3.4–4.8)
ALBUMIN/GLOB SERPL: 0.9 RATIO (ref 1.1–2)
ALP SERPL-CCNC: 114 UNIT/L (ref 40–150)
ALT SERPL-CCNC: 8 UNIT/L (ref 0–55)
ANION GAP SERPL CALC-SCNC: 8 MEQ/L
AST SERPL-CCNC: 18 UNIT/L (ref 5–34)
BASOPHILS # BLD AUTO: 0.2 X10(3)/MCL
BASOPHILS NFR BLD AUTO: 1.2 %
BILIRUB SERPL-MCNC: 0.9 MG/DL
BUN SERPL-MCNC: 23.6 MG/DL (ref 8.4–25.7)
CALCIUM SERPL-MCNC: 8.5 MG/DL (ref 8.8–10)
CHLORIDE SERPL-SCNC: 103 MMOL/L (ref 98–107)
CO2 SERPL-SCNC: 29 MMOL/L (ref 23–31)
CREAT SERPL-MCNC: 0.9 MG/DL (ref 0.73–1.18)
CREAT/UREA NIT SERPL: 26
EOSINOPHIL # BLD AUTO: 0 X10(3)/MCL (ref 0–0.9)
EOSINOPHIL NFR BLD AUTO: 0 %
ERYTHROCYTE [DISTWIDTH] IN BLOOD BY AUTOMATED COUNT: 19.7 % (ref 11.5–17)
GFR SERPLBLD CREATININE-BSD FMLA CKD-EPI: >60 ML/MIN/1.73/M2
GLOBULIN SER-MCNC: 3.9 GM/DL (ref 2.4–3.5)
GLUCOSE SERPL-MCNC: 158 MG/DL (ref 82–115)
HCT VFR BLD AUTO: 28.7 % (ref 42–52)
HGB BLD-MCNC: 8.7 G/DL (ref 14–18)
IMM GRANULOCYTES # BLD AUTO: 2.26 X10(3)/MCL (ref 0–0.04)
IMM GRANULOCYTES NFR BLD AUTO: 13.8 %
LYMPHOCYTES # BLD AUTO: 5.29 X10(3)/MCL (ref 0.6–4.6)
LYMPHOCYTES NFR BLD AUTO: 32.3 %
MCH RBC QN AUTO: 44.8 PG (ref 27–31)
MCHC RBC AUTO-ENTMCNC: 30.3 G/DL (ref 33–36)
MCV RBC AUTO: 147.9 FL (ref 80–94)
MONOCYTES # BLD AUTO: 4.12 X10(3)/MCL (ref 0.1–1.3)
MONOCYTES NFR BLD AUTO: 25.1 %
NEUTROPHILS # BLD AUTO: 4.52 X10(3)/MCL (ref 2.1–9.2)
NEUTROPHILS NFR BLD AUTO: 27.6 %
PLATELET # BLD AUTO: 98 X10(3)/MCL (ref 130–400)
PMV BLD AUTO: 12.9 FL (ref 7.4–10.4)
POTASSIUM SERPL-SCNC: 4.7 MMOL/L (ref 3.5–5.1)
PROT SERPL-MCNC: 7.3 GM/DL (ref 5.8–7.6)
RBC # BLD AUTO: 1.94 X10(6)/MCL (ref 4.7–6.1)
SODIUM SERPL-SCNC: 140 MMOL/L (ref 136–145)
WBC # BLD AUTO: 16.39 X10(3)/MCL (ref 4.5–11.5)

## 2024-10-01 PROCEDURE — 80053 COMPREHEN METABOLIC PANEL: CPT

## 2024-10-01 PROCEDURE — 36415 COLL VENOUS BLD VENIPUNCTURE: CPT

## 2024-10-01 PROCEDURE — 63600175 PHARM REV CODE 636 W HCPCS: Mod: JZ,EC,JG | Performed by: INTERNAL MEDICINE

## 2024-10-01 PROCEDURE — 96372 THER/PROPH/DIAG INJ SC/IM: CPT

## 2024-10-01 PROCEDURE — 85025 COMPLETE CBC W/AUTO DIFF WBC: CPT

## 2024-10-01 RX ADMIN — EPOETIN ALFA-EPBX 40000 UNITS: 40000 INJECTION, SOLUTION INTRAVENOUS; SUBCUTANEOUS at 09:10

## 2024-10-03 ENCOUNTER — DOCUMENTATION ONLY (OUTPATIENT)
Dept: SURGICAL ONCOLOGY | Facility: CLINIC | Age: 79
End: 2024-10-03
Payer: MEDICARE

## 2024-10-03 NOTE — PROGRESS NOTES
Faxed order for US Soft Tissue Right AXILLA to [facility] to be scheduled in Dec. Reminder set to f/u end of Nov to schedule rtc. cpl

## 2024-10-15 ENCOUNTER — INFUSION (OUTPATIENT)
Dept: INFUSION THERAPY | Facility: HOSPITAL | Age: 79
End: 2024-10-15
Attending: INTERNAL MEDICINE
Payer: MEDICARE

## 2024-10-15 ENCOUNTER — HOSPITAL ENCOUNTER (OUTPATIENT)
Dept: RADIOLOGY | Facility: HOSPITAL | Age: 79
Discharge: HOME | End: 2024-10-15
Attending: INTERNAL MEDICINE
Payer: MEDICARE

## 2024-10-15 ENCOUNTER — OFFICE VISIT (OUTPATIENT)
Dept: HEMATOLOGY/ONCOLOGY | Facility: CLINIC | Age: 79
End: 2024-10-15
Payer: MEDICARE

## 2024-10-15 VITALS
SYSTOLIC BLOOD PRESSURE: 101 MMHG | HEIGHT: 74 IN | HEART RATE: 85 BPM | BODY MASS INDEX: 26.77 KG/M2 | DIASTOLIC BLOOD PRESSURE: 60 MMHG | TEMPERATURE: 98 F | WEIGHT: 208.56 LBS

## 2024-10-15 DIAGNOSIS — D46.Z MYELODYSPLASTIC SYNDROME, LOW GRADE: Primary | ICD-10-CM

## 2024-10-15 DIAGNOSIS — R06.02 SOB (SHORTNESS OF BREATH): ICD-10-CM

## 2024-10-15 DIAGNOSIS — D46.Z MYELODYSPLASTIC SYNDROME, LOW GRADE: ICD-10-CM

## 2024-10-15 DIAGNOSIS — D61.818 PANCYTOPENIA: ICD-10-CM

## 2024-10-15 DIAGNOSIS — J90 PLEURAL EFFUSION: ICD-10-CM

## 2024-10-15 DIAGNOSIS — C43.59 MALIGNANT MELANOMA OF OTHER PART OF TRUNK: ICD-10-CM

## 2024-10-15 PROCEDURE — 63600175 PHARM REV CODE 636 W HCPCS: Mod: JZ,EC,JG | Performed by: INTERNAL MEDICINE

## 2024-10-15 PROCEDURE — 99999 PR PBB SHADOW E&M-EST. PATIENT-LVL IV: CPT | Mod: PBBFAC,,, | Performed by: INTERNAL MEDICINE

## 2024-10-15 PROCEDURE — 99214 OFFICE O/P EST MOD 30 MIN: CPT | Mod: S$PBB,,, | Performed by: INTERNAL MEDICINE

## 2024-10-15 PROCEDURE — 99214 OFFICE O/P EST MOD 30 MIN: CPT | Mod: PBBFAC | Performed by: INTERNAL MEDICINE

## 2024-10-15 PROCEDURE — 71046 X-RAY EXAM CHEST 2 VIEWS: CPT | Mod: TC

## 2024-10-15 PROCEDURE — 96372 THER/PROPH/DIAG INJ SC/IM: CPT

## 2024-10-15 RX ADMIN — EPOETIN ALFA-EPBX 40000 UNITS: 40000 INJECTION, SOLUTION INTRAVENOUS; SUBCUTANEOUS at 10:10

## 2024-10-15 NOTE — PROGRESS NOTES
"Subjective:       Patient ID: Israel Ibarra is a 78 y.o. male.    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  Repeat bone marrow biopsy on 4/6/2021 showed that the small kappa restricted B-cell population now accounts for less than 1% of the viable lymphocytes.    Current Treatment:    B-12 supplementation.   Epoetin every 2 weeks     Treatment History:  1.  Splenectomy    HPI:  Patient who was seen by Dr. Neal at [facility] for pancytopenia on 8/19/2020.  It was noted that the patient had leukopenia dating back to 2014.  He then began to have neutropenia and anemia in January 2020 and pancytopenia began in July 2020.  Peripheral blood smear on 7/8/2020 showed moderate macrocytic normochromic anemia, moderate leukopenia with absolute neutropenia with a few reactive lymphocytes.  There was mild thrombocytopenia with normal platelet morphology.  The patient then underwent a bone marrow biopsy on 9/8/2020, this showed a hypercellular bone marrow for age (50 to 60%) with trilineage hematopoiesis, lymphoid cells normal in number with few small atypical forms, flow cytometry revealed a small 11% monoclonal B-cell population, indeterminate for malignancy.  FISH was normal, cytogenetics showed 46,Y,inv(X)(p22.1q24)?c[20]. Further work-up was unrevealing occluding HIV negative, hepatitis A IgM negative, hepatitis B core IgM negative, hepatitis B surface antigen negative, hepatitis C antibody negative.  He was however found to be B12 deficient and started on B12 supplementation.  He continued to follow with Dr. Shin, most recently seen on 2/23/2021 via telemedicine.  Due to persistent pancytopenia with macrocytosis without improvement after B12 supplementation, it was recommended that he undergo repeat bone marrow biopsy and aspiration.  He then saw me " initially on 3/8/2021.  At that time, he had no major complaints other than easy bruising.    Repeat bone marrow biopsy on 4/6/2021 revealed normocellular bone marrow with trilineage hematopoiesis and maturation, no increase in lymphocytes, no dysplasia identified, small kappa restricted B-cell population now accounting for less than 1% of viable lymphocytes.  He did have an admission to the hospital on 4/15/2021 for neutropenic fever, no organism identified.  CT scan of the neck, chest, abdomen, and pelvis on 6/14/2021 showed no lymph nodes that are enlarged by size criteria in the neck, chest, abdomen, or pelvis.  There is splenomegaly with the spleen measuring 14.1 x 8.4 cm.  There are a few subcentimeter right sided lung nodules that are nonspecific, but most likely related to either an infectious or inflammatory process.  There was enlarged somewhat irregular prostate extending exophytically into the posterior aspect of the urinary bladder, prostatic malignancy is not excluded.  There is diffuse wall thickening of the urinary bladder that may be related to chronic outlet obstruction or cystitis.  Case discussed at [facility] tumor board on 6/25/2021, decision made to move forward with splenectomy.  Patient underwent planned therapeutic splenectomy on 9/15/2021, tolerated well. Pathology negative for malignancy.  Patient underwent a CT scan of the chest, abdomen, and pelvis on 07/01/2022 that revealed small lymph nodes in the small bowel mesentery that have increased in number and slightly enlarged.  Otherwise, no evidence of other pathology. CT scan of the chest, abdomen, and pelvis done on 10/27/2022 showed stable prominent mesenteric lymph nodes with no other abnormalities.  A repeat CT scan of the chest, abdomen, and pelvis on 04/06/2023 revealed unchanged appearance of prominent but not pathologically enlarged mesenteric lymph nodes with new small bilateral pleural effusions, mesenteric edema, and anasarca  consistent with fluid overload.  There was some gallbladder wall edema without gallbladder distention could be secondary to fluid status but acute cholecystitis could not be entirely excluded.    Repeat bone marrow biopsy done on 2024 due to persisting macrocytosis and anemia showed a hypercellular bone marrow (50%) with mild disc megakaryopoiesis and this erythropoiesis highly suspicious for MDS with cytogenetics showing 47,Y,idic(X)(q13),+8(20) and these changes are primarily associated with MDS.  His IPSS R score gets to points for intermediate cytogenetics and 1.4 a hemoglobin between 8 and 10 giving him a score of 3 which is low risk MDS.    Interval History:   Patient presents to clinic for scheduled follow up.  He states that he was very fatigued.  He has no real energy.  He overall just does not feel great.    Past Medical History:   Diagnosis Date    Diabetes mellitus     Enlarged prostate     Gout     HLD (hyperlipidemia)     [place] (hard of hearing)     Hypertension     Monoclonal B-cell lymphocytosis     Pancytopenia       Past Surgical History:   Procedure Laterality Date    BACK SURGERY      BACK SURGERY      BASAL CELL CARCINOMA EXCISION      head    BONE MARROW BIOPSY N/A 1/3/2024    Procedure: Biopsy-bone marrow;  Surgeon: Dylon Wheatley MD;  Location: [facility];  Service: General;  Laterality: N/A;    BONE MARROW BIOPSY W/ ASPIRATION  2021    CATARACT EXTRACTION, BILATERAL      CORONARY ARTERY BYPASS GRAFT (CABG)      LAPAROSCOPIC SPLENECTOMY      SHOULDER SURGERY Left     SHOULDER SURGERY Bilateral     SKIN CANCER EXCISION      on face    TONSILLECTOMY       Social History     Socioeconomic History    Marital status:    Tobacco Use    Smoking status: Former     Current packs/day: 0.00     Average packs/day: 1.3 packs/day for 12.0 years (15.0 ttl pk-yrs)     Types: Cigarettes     Start date:      Quit date:      Years since quittin.8    Smokeless tobacco: Former     Tobacco comments:     patient stated that he quit at age 30   Substance and Sexual Activity    Alcohol use: Never    Drug use: Never     Social Drivers of Health     Financial Resource Strain: Low Risk  (8/12/2024)    Received from [facility]    Overall Financial Resource Strain (CARDIA)     Difficulty of Paying Living Expenses: Not very hard   Food Insecurity: No Food Insecurity (8/19/2024)    [facility]    Hunger Vital Sign     Worried About Running Out of Food in the Last Year: Never true     Ran Out of Food in the Last Year: Never true   Transportation Needs: No Transportation Needs (8/19/2024)    Received from [facility]    PRAPARE - Transportation     Lack of Transportation (Medical): No     Lack of Transportation (Non-Medical): No   Physical Activity: Unknown (5/28/2024)    [facility]    Exercise Vital Sign     Days of Exercise per Week: 0 days     Minutes of Exercise per Session: Patient declined   Stress: No Stress Concern Present (5/28/2024)    Received from [facility]    [employer] - Occupational Stress Questionnaire     Feeling of Stress : Only a little   Housing Stability: Low Risk  (8/19/2024)    Received from [facility]    Housing Stability Vital Sign     Unable to Pay for Housing in the Last Year: No     Number of Times Moved in the Last Year: 1     Homeless in the Last Year: No      Family History   Problem Relation Name Age of Onset    No Known Problems Mother      Leukemia Father      Diabetes  Sister      Hypertension Sister      Heart disease Sister      Diabetes Brother      Hypertension Brother      Melanoma Brother      Diabetes Sister      Hypertension Sister      Diabetes Sister      Hypertension Sister      Diabetes Sister      Hypertension Sister      Diabetes Sister      Hypertension Sister      Diabetes Brother      Hypertension Brother      Heart disease Brother        Review of patient's allergies indicates:  No Known Allergies   Review of Systems   Constitutional:  Positive for fatigue. Negative for appetite change, fever and unexpected weight change.   HENT:  Negative for nasal congestion, nosebleeds, sinus pressure/congestion and sore throat.    Eyes:  Negative for visual disturbance and eye dryness.   Respiratory:  Negative for chest tightness.    Cardiovascular:  Positive for leg swelling. Negative for chest pain.   Gastrointestinal:  Negative for abdominal pain, blood in stool and diarrhea.   Genitourinary:  Negative for difficulty urinating, frequency and hematuria.   Musculoskeletal:  Positive for back pain and leg pain.   Neurological:  Negative for dizziness, syncope, numbness and headaches.   Hematological:  Negative for adenopathy.   Psychiatric/Behavioral:  The patient is not nervous/anxious.          Objective:      Physical Exam  Vitals reviewed.   Constitutional:       General: He is awake.      Appearance: Normal appearance.   HENT:      Head: Normocephalic and atraumatic.      Right Ear: Hearing normal.      Left Ear: Hearing normal.      Nose: Nose normal.   Eyes:      General: Lids are normal. Vision grossly intact.      Extraocular Movements: Extraocular movements intact.      Conjunctiva/sclera: Conjunctivae normal.   Cardiovascular:      Rate and Rhythm: Normal rate and regular rhythm.      Pulses: Normal pulses.      Heart sounds: Normal heart sounds.   Pulmonary:      Effort: Pulmonary effort is normal.      Breath sounds: No wheezing, rhonchi or rales.    Musculoskeletal:      Cervical back: Full passive range of motion without pain.      Right lower leg: Edema present.      Left lower leg: Edema present.   Skin:     General: Skin is warm.   Neurological:      General: No focal deficit present.      Mental Status: He is alert and oriented to person, place, and time.   Psychiatric:         Attention and Perception: Attention normal.         Mood and Affect: Mood and affect normal.         Behavior: Behavior is cooperative.       LABS AND IMAGING REVIEWED IN EPIC          Assessment:     1.  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  Vitamin B12 deficiency  3.  Monoclonal B-cell population on flow cytometry from bone marrow biopsy from September 2020, this involves 11% of the bone marrow, repeat bone marrow showed less than 1% involvement  4. Abnormal weight loss  5. Constipation and bowel habit irregularities         Plan:       Patient is status post splenectomy for splenomegaly.  Pathology negative for malignancy.  He does have this history of monoclonal B-cell population on bone marrow biopsy.    Small bowel mesentery lymph nodes are stable 4/2023.      Patient does have an elevated MCV and anemia, decided to repeat a bone marrow biopsy on 01/03/2024.  This revealed low risk MDS based off of IPSS-R score, cytogenetics showing 47,Y,idic(X)(q13),+8(20)    Chest X-ray today due to shortness of breath and cough    Retacrit weekly with CBC.    Return to clinic in 4 weeks.  We will discuss luspatercept vs. Imetelstat.  Think it will be time to change him over to 1 of these medications.        Telly Mendoza II, MD

## 2024-10-21 ENCOUNTER — HOSPITAL ENCOUNTER (INPATIENT)
Facility: HOSPITAL | Age: 79
LOS: 4 days | Discharge: HOSPICE | DRG: 291 | End: 2024-10-25
Attending: STUDENT IN AN ORGANIZED HEALTH CARE EDUCATION/TRAINING PROGRAM | Admitting: INTERNAL MEDICINE
Payer: MEDICARE

## 2024-10-21 DIAGNOSIS — I50.9 ACUTE ON CHRONIC CONGESTIVE HEART FAILURE, UNSPECIFIED HEART FAILURE TYPE: Primary | ICD-10-CM

## 2024-10-21 DIAGNOSIS — E87.5 HYPERKALEMIA: ICD-10-CM

## 2024-10-21 DIAGNOSIS — R07.9 CHEST PAIN: ICD-10-CM

## 2024-10-21 DIAGNOSIS — N17.9 AKI (ACUTE KIDNEY INJURY): ICD-10-CM

## 2024-10-21 DIAGNOSIS — R06.02 SHORTNESS OF BREATH: ICD-10-CM

## 2024-10-21 DIAGNOSIS — I50.9 CHF (CONGESTIVE HEART FAILURE): ICD-10-CM

## 2024-10-21 LAB
ABS NEUT (OLG): 13.42 X10(3)/MCL (ref 2.1–9.2)
ALBUMIN SERPL-MCNC: 3.1 G/DL (ref 3.4–4.8)
ALBUMIN/GLOB SERPL: 0.7 RATIO (ref 1.1–2)
ALP SERPL-CCNC: 124 UNIT/L (ref 40–150)
ALT SERPL-CCNC: 11 UNIT/L (ref 0–55)
ANION GAP SERPL CALC-SCNC: 8 MEQ/L
ANION GAP SERPL CALC-SCNC: 8 MEQ/L
ANISOCYTOSIS BLD QL SMEAR: ABNORMAL
AST SERPL-CCNC: 18 UNIT/L (ref 5–34)
BILIRUB SERPL-MCNC: 0.6 MG/DL
BNP BLD-MCNC: 2837.1 PG/ML
BUN SERPL-MCNC: 55.6 MG/DL (ref 8.4–25.7)
BUN SERPL-MCNC: 56.5 MG/DL (ref 8.4–25.7)
CALCIUM SERPL-MCNC: 8.1 MG/DL (ref 8.8–10)
CALCIUM SERPL-MCNC: 8.1 MG/DL (ref 8.8–10)
CHLORIDE SERPL-SCNC: 101 MMOL/L (ref 98–107)
CHLORIDE SERPL-SCNC: 102 MMOL/L (ref 98–107)
CO2 SERPL-SCNC: 23 MMOL/L (ref 23–31)
CO2 SERPL-SCNC: 24 MMOL/L (ref 23–31)
CREAT SERPL-MCNC: 1.77 MG/DL (ref 0.72–1.25)
CREAT SERPL-MCNC: 1.87 MG/DL (ref 0.72–1.25)
CREAT/UREA NIT SERPL: 30
CREAT/UREA NIT SERPL: 31
ERYTHROCYTE [DISTWIDTH] IN BLOOD BY AUTOMATED COUNT: 20.7 % (ref 11.5–17)
GFR SERPLBLD CREATININE-BSD FMLA CKD-EPI: 36 ML/MIN/1.73/M2
GFR SERPLBLD CREATININE-BSD FMLA CKD-EPI: 39 ML/MIN/1.73/M2
GLOBULIN SER-MCNC: 4.4 GM/DL (ref 2.4–3.5)
GLUCOSE SERPL-MCNC: 165 MG/DL (ref 82–115)
GLUCOSE SERPL-MCNC: 185 MG/DL (ref 82–115)
HCT VFR BLD AUTO: 23.7 % (ref 42–52)
HGB BLD-MCNC: 7.3 G/DL (ref 14–18)
INSTRUMENT WBC (OLG): 33.55 X10(3)/MCL
LYMPHOCYTES NFR BLD MANUAL: 21 %
LYMPHOCYTES NFR BLD MANUAL: 7.05 X10(3)/MCL
MACROCYTES BLD QL SMEAR: ABNORMAL
MAGNESIUM SERPL-MCNC: 2 MG/DL (ref 1.6–2.6)
MCH RBC QN AUTO: 45.9 PG (ref 27–31)
MCHC RBC AUTO-ENTMCNC: 30.8 G/DL (ref 33–36)
MCV RBC AUTO: 149.1 FL (ref 80–94)
MONOCYTES NFR BLD MANUAL: 29 %
MONOCYTES NFR BLD MANUAL: 9.73 X10(3)/MCL (ref 0.1–1.3)
MYELOCYTES NFR BLD MANUAL: 9 %
NEUTROPHILS NFR BLD MANUAL: 40 %
NRBC BLD AUTO-RTO: 8.3 %
NRBC BLD MANUAL-RTO: 21 %
OVALOCYTES (OLG): ABNORMAL
PLATELET # BLD AUTO: 125 X10(3)/MCL (ref 130–400)
PLATELET # BLD EST: ABNORMAL 10*3/UL
PLATELETS.RETICULATED NFR BLD AUTO: 25.1 % (ref 0.9–11.2)
PMV BLD AUTO: 13.7 FL (ref 7.4–10.4)
POCT GLUCOSE: 194 MG/DL (ref 70–110)
POIKILOCYTOSIS BLD QL SMEAR: ABNORMAL
POTASSIUM SERPL-SCNC: 5.9 MMOL/L (ref 3.5–5.1)
POTASSIUM SERPL-SCNC: 6 MMOL/L (ref 3.5–5.1)
PROMYELOCYTES # BLD MANUAL: 1 %
PROT SERPL-MCNC: 7.5 GM/DL (ref 5.8–7.6)
RBC # BLD AUTO: 1.59 X10(6)/MCL (ref 4.7–6.1)
RBC MORPH BLD: ABNORMAL
SODIUM SERPL-SCNC: 133 MMOL/L (ref 136–145)
SODIUM SERPL-SCNC: 133 MMOL/L (ref 136–145)
STOMATOCYTES (OLG): ABNORMAL
TROPONIN I SERPL-MCNC: 0.02 NG/ML (ref 0–0.04)
WBC # BLD AUTO: 33.55 X10(3)/MCL (ref 4.5–11.5)

## 2024-10-21 PROCEDURE — 5A09357 ASSISTANCE WITH RESPIRATORY VENTILATION, LESS THAN 24 CONSECUTIVE HOURS, CONTINUOUS POSITIVE AIRWAY PRESSURE: ICD-10-PCS | Performed by: INTERNAL MEDICINE

## 2024-10-21 PROCEDURE — 11000001 HC ACUTE MED/SURG PRIVATE ROOM

## 2024-10-21 PROCEDURE — 25000003 PHARM REV CODE 250: Performed by: STUDENT IN AN ORGANIZED HEALTH CARE EDUCATION/TRAINING PROGRAM

## 2024-10-21 PROCEDURE — 96375 TX/PRO/DX INJ NEW DRUG ADDON: CPT

## 2024-10-21 PROCEDURE — 27000190 HC CPAP FULL FACE MASK W/VALVE

## 2024-10-21 PROCEDURE — 94799 UNLISTED PULMONARY SVC/PX: CPT

## 2024-10-21 PROCEDURE — 84484 ASSAY OF TROPONIN QUANT: CPT

## 2024-10-21 PROCEDURE — 27100171 HC OXYGEN HIGH FLOW UP TO 24 HOURS

## 2024-10-21 PROCEDURE — 83735 ASSAY OF MAGNESIUM: CPT

## 2024-10-21 PROCEDURE — 94660 CPAP INITIATION&MGMT: CPT

## 2024-10-21 PROCEDURE — 83880 ASSAY OF NATRIURETIC PEPTIDE: CPT

## 2024-10-21 PROCEDURE — 99900031 HC PATIENT EDUCATION (STAT)

## 2024-10-21 PROCEDURE — 63600175 PHARM REV CODE 636 W HCPCS: Performed by: STUDENT IN AN ORGANIZED HEALTH CARE EDUCATION/TRAINING PROGRAM

## 2024-10-21 PROCEDURE — 85027 COMPLETE CBC AUTOMATED: CPT

## 2024-10-21 PROCEDURE — 93005 ELECTROCARDIOGRAM TRACING: CPT

## 2024-10-21 PROCEDURE — 85007 BL SMEAR W/DIFF WBC COUNT: CPT

## 2024-10-21 PROCEDURE — 99900035 HC TECH TIME PER 15 MIN (STAT)

## 2024-10-21 PROCEDURE — 96365 THER/PROPH/DIAG IV INF INIT: CPT

## 2024-10-21 PROCEDURE — 93010 ELECTROCARDIOGRAM REPORT: CPT | Mod: ,,, | Performed by: INTERNAL MEDICINE

## 2024-10-21 PROCEDURE — 80053 COMPREHEN METABOLIC PANEL: CPT

## 2024-10-21 RX ORDER — FUROSEMIDE 10 MG/ML
60 INJECTION INTRAMUSCULAR; INTRAVENOUS
Status: COMPLETED | OUTPATIENT
Start: 2024-10-21 | End: 2024-10-21

## 2024-10-21 RX ORDER — SODIUM CHLORIDE 0.9 % (FLUSH) 0.9 %
10 SYRINGE (ML) INJECTION
Status: DISCONTINUED | OUTPATIENT
Start: 2024-10-21 | End: 2024-10-25 | Stop reason: HOSPADM

## 2024-10-21 RX ORDER — CALCIUM GLUCONATE 20 MG/ML
1 INJECTION, SOLUTION INTRAVENOUS
Status: COMPLETED | OUTPATIENT
Start: 2024-10-21 | End: 2024-10-21

## 2024-10-21 RX ORDER — TALC
6 POWDER (GRAM) TOPICAL NIGHTLY PRN
Status: DISCONTINUED | OUTPATIENT
Start: 2024-10-21 | End: 2024-10-25 | Stop reason: HOSPADM

## 2024-10-21 RX ORDER — INDOMETHACIN 25 MG/1
50 CAPSULE ORAL
Status: COMPLETED | OUTPATIENT
Start: 2024-10-21 | End: 2024-10-21

## 2024-10-21 RX ADMIN — SODIUM BICARBONATE 50 MEQ: 84 INJECTION, SOLUTION INTRAVENOUS at 08:10

## 2024-10-21 RX ADMIN — SODIUM ZIRCONIUM CYCLOSILICATE 10 G: 10 POWDER, FOR SUSPENSION ORAL at 08:10

## 2024-10-21 RX ADMIN — DEXTROSE MONOHYDRATE 250 ML: 100 INJECTION, SOLUTION INTRAVENOUS at 08:10

## 2024-10-21 RX ADMIN — CALCIUM GLUCONATE 1 G: 20 INJECTION, SOLUTION INTRAVENOUS at 08:10

## 2024-10-21 RX ADMIN — HUMAN INSULIN 5 UNITS: 100 INJECTION, SOLUTION SUBCUTANEOUS at 08:10

## 2024-10-21 RX ADMIN — FUROSEMIDE 60 MG: 10 INJECTION, SOLUTION INTRAMUSCULAR; INTRAVENOUS at 08:10

## 2024-10-22 PROBLEM — I50.9 ACUTE ON CHRONIC CONGESTIVE HEART FAILURE: Status: ACTIVE | Noted: 2024-10-22

## 2024-10-22 PROBLEM — I50.23 ACUTE ON CHRONIC HFREF (HEART FAILURE WITH REDUCED EJECTION FRACTION): Status: ACTIVE | Noted: 2024-10-22

## 2024-10-22 PROBLEM — J90 RECURRENT RIGHT PLEURAL EFFUSION: Status: ACTIVE | Noted: 2024-10-22

## 2024-10-22 LAB
ABS NEUT (OLG): 12.73 X10(3)/MCL (ref 2.1–9.2)
ALBUMIN SERPL-MCNC: 3 G/DL (ref 3.4–4.8)
ALBUMIN/GLOB SERPL: 0.7 RATIO (ref 1.1–2)
ALP SERPL-CCNC: 109 UNIT/L (ref 40–150)
ALT SERPL-CCNC: 9 UNIT/L (ref 0–55)
AMYLASE FLD-CCNC: 9 U/L
ANION GAP SERPL CALC-SCNC: 5 MEQ/L
ANISOCYTOSIS BLD QL SMEAR: ABNORMAL
APICAL FOUR CHAMBER EJECTION FRACTION: 37 %
APICAL TWO CHAMBER EJECTION FRACTION: 46 %
APTT PPP: 33.7 SECONDS (ref 23.2–33.7)
AST SERPL-CCNC: 16 UNIT/L (ref 5–34)
AV INDEX (PROSTH): 0.68
AV MEAN GRADIENT: 6 MMHG
AV PEAK GRADIENT: 9 MMHG
AV VALVE AREA BY VELOCITY RATIO: 2.3 CM²
AV VALVE AREA: 2.3 CM²
AV VELOCITY RATIO: 0.67
BACTERIA #/AREA URNS AUTO: ABNORMAL /HPF
BILIRUB SERPL-MCNC: 0.7 MG/DL
BILIRUB UR QL STRIP.AUTO: NEGATIVE
BSA FOR ECHO PROCEDURE: 2.23 M2
BUN SERPL-MCNC: 56.4 MG/DL (ref 8.4–25.7)
CALCIUM SERPL-MCNC: 8.2 MG/DL (ref 8.8–10)
CHLORIDE SERPL-SCNC: 100 MMOL/L (ref 98–107)
CLARITY BODY FLUID (OLG): NORMAL
CLARITY UR: CLEAR
CO2 SERPL-SCNC: 30 MMOL/L (ref 23–31)
COLOR BODY FLUID (OLG): YELLOW
COLOR UR AUTO: ABNORMAL
CREAT SERPL-MCNC: 1.63 MG/DL (ref 0.72–1.25)
CREAT UR-MCNC: 53.6 MG/DL (ref 63–166)
CREAT/UREA NIT SERPL: 35
CV ECHO LV RWT: 0.36 CM
DOP CALC AO PEAK VEL: 1.5 M/S
DOP CALC AO VTI: 32.2 CM
DOP CALC LVOT AREA: 3.5 CM2
DOP CALC LVOT DIAMETER: 2.1 CM
DOP CALC LVOT PEAK VEL: 1 M/S
DOP CALC LVOT STROKE VOLUME: 75.5 CM3
DOP CALC MV VTI: 42.4 CM
DOP CALCLVOT PEAK VEL VTI: 21.8 CM
ECHO LV POSTERIOR WALL: 1.1 CM (ref 0.6–1.1)
ERYTHROCYTE [DISTWIDTH] IN BLOOD BY AUTOMATED COUNT: 20.7 % (ref 11.5–17)
FIBRINOGEN PPP-MCNC: 201 MG/DL (ref 210–463)
FRACTIONAL SHORTENING: 11.5 % (ref 28–44)
GFR SERPLBLD CREATININE-BSD FMLA CKD-EPI: 43 ML/MIN/1.73/M2
GIANT PLATELETS: ABNORMAL
GLOBULIN SER-MCNC: 4.1 GM/DL (ref 2.4–3.5)
GLUCOSE FLD-MCNC: 147 MG/DL
GLUCOSE SERPL-MCNC: 121 MG/DL (ref 82–115)
GLUCOSE UR QL STRIP: NORMAL
GROUP & RH: NORMAL
HCT VFR BLD AUTO: 24.3 % (ref 42–52)
HEMATOLOGIST REVIEW: NORMAL
HGB BLD-MCNC: 7.4 G/DL (ref 14–18)
HGB UR QL STRIP: NEGATIVE
HOWELL JOLLY BODIES (OLG): ABNORMAL
HYALINE CASTS #/AREA URNS LPF: >20 /LPF
INDIRECT COOMBS: NORMAL
INR PPP: 1.5
INSTRUMENT WBC (OLG): 33.51 X10(3)/MCL
INTERVENTRICULAR SEPTUM: 1.1 CM (ref 0.6–1.1)
KETONES UR QL STRIP: NEGATIVE
KOH PREP SPEC: NORMAL
LDH FLD-CCNC: 136 U/L
LDH SERPL-CCNC: 352 U/L (ref 125–220)
LEFT ATRIUM AREA SYSTOLIC (APICAL 2 CHAMBER): 20.6 CM2
LEFT ATRIUM AREA SYSTOLIC (APICAL 4 CHAMBER): 26 CM2
LEFT ATRIUM SIZE: 4.5 CM
LEFT ATRIUM VOLUME INDEX MOD: 31.9 ML/M2
LEFT ATRIUM VOLUME MOD: 70.8 ML
LEFT INTERNAL DIMENSION IN SYSTOLE: 5.4 CM (ref 2.1–4)
LEFT VENTRICLE DIASTOLIC VOLUME INDEX: 85.59 ML/M2
LEFT VENTRICLE DIASTOLIC VOLUME: 190 ML
LEFT VENTRICLE END DIASTOLIC VOLUME APICAL 2 CHAMBER: 244 ML
LEFT VENTRICLE END DIASTOLIC VOLUME APICAL 4 CHAMBER: 254 ML
LEFT VENTRICLE END SYSTOLIC VOLUME APICAL 2 CHAMBER: 58.9 ML
LEFT VENTRICLE END SYSTOLIC VOLUME APICAL 4 CHAMBER: 73.4 ML
LEFT VENTRICLE MASS INDEX: 129.5 G/M2
LEFT VENTRICLE SYSTOLIC VOLUME INDEX: 63.5 ML/M2
LEFT VENTRICLE SYSTOLIC VOLUME: 141 ML
LEFT VENTRICULAR INTERNAL DIMENSION IN DIASTOLE: 6.1 CM (ref 3.5–6)
LEFT VENTRICULAR MASS: 287.5 G
LEUKOCYTE ESTERASE UR QL STRIP: NEGATIVE
LVED V (TEICH): 190 ML
LVES V (TEICH): 141 ML
LVOT MG: 2 MMHG
LVOT MV: 0.73 CM/S
LYMPHOCYTES NFR BLD MANUAL: 16 %
LYMPHOCYTES NFR BLD MANUAL: 5.36 X10(3)/MCL
LYMPHOCYTES NFR FLD MANUAL: 86 %
MACROCYTES BLD QL SMEAR: ABNORMAL
MACROPHAGES MAN COUNT BF (OLG): 13
MAGNESIUM SERPL-MCNC: 1.9 MG/DL (ref 1.6–2.6)
MCH RBC QN AUTO: 44.8 PG (ref 27–31)
MCHC RBC AUTO-ENTMCNC: 30.5 G/DL (ref 33–36)
MCV RBC AUTO: 147.3 FL (ref 80–94)
METAMYELOCYTES NFR BLD MANUAL: 11 %
MONOCYTE MAN % BF (OLG): 6 %
MONOCYTES NFR BLD MANUAL: 25 %
MONOCYTES NFR BLD MANUAL: 8.38 X10(3)/MCL (ref 0.1–1.3)
MUCOUS THREADS URNS QL MICRO: ABNORMAL /LPF
MV MEAN GRADIENT: 3 MMHG
MV PEAK GRADIENT: 7 MMHG
MV STENOSIS PRESSURE HALF TIME: 88 MS
MV VALVE AREA BY CONTINUITY EQUATION: 1.78 CM2
MV VALVE AREA P 1/2 METHOD: 2.5 CM2
MYELOCYTES NFR BLD MANUAL: 9 %
NEUTROPHILS MAN % BF (OLG): 8 %
NEUTROPHILS NFR BLD MANUAL: 38 %
NITRITE UR QL STRIP: NEGATIVE
NRBC BLD AUTO-RTO: 8.8 %
NRBC BLD MANUAL-RTO: 18 %
OHS LV EJECTION FRACTION SIMPSONS BIPLANE MOD: 40 %
OHS QRS DURATION: 162 MS
OHS QTC CALCULATION: 506 MS
PH UR STRIP: 5 [PH]
PHOSPHATE SERPL-MCNC: 5.9 MG/DL (ref 2.3–4.7)
PISA TR MAX VEL: 2.7 M/S
PLATELET # BLD AUTO: 116 X10(3)/MCL (ref 130–400)
PLATELET # BLD EST: ABNORMAL 10*3/UL
PLATELETS.RETICULATED NFR BLD AUTO: 26.5 % (ref 0.9–11.2)
PMV BLD AUTO: 14 FL (ref 7.4–10.4)
POCT GLUCOSE: 163 MG/DL (ref 70–110)
POCT GLUCOSE: 178 MG/DL (ref 70–110)
POCT GLUCOSE: 211 MG/DL (ref 70–110)
POIKILOCYTOSIS BLD QL SMEAR: ABNORMAL
POTASSIUM SERPL-SCNC: 5.5 MMOL/L (ref 3.5–5.1)
PROMYELOCYTES # BLD MANUAL: 1 %
PROT FLD-MCNC: 3.7 GM/DL
PROT SERPL-MCNC: 7.1 GM/DL (ref 5.8–7.6)
PROT UR QL STRIP: ABNORMAL
PROT UR STRIP-MCNC: 19.7 MG/DL
PROTHROMBIN TIME: 18.2 SECONDS (ref 12.5–14.5)
RA PRESSURE ESTIMATED: 15 MMHG
RBC # BLD AUTO: 1.65 X10(6)/MCL (ref 4.7–6.1)
RBC #/AREA URNS AUTO: ABNORMAL /HPF
RBC MORPH BLD: ABNORMAL
RV TB RVSP: 18 MMHG
SCHISTOCYTE (OLG): ABNORMAL
SINUS: 3.6 CM
SODIUM SERPL-SCNC: 135 MMOL/L (ref 136–145)
SODIUM UR-SCNC: 63 MMOL/L
SP GR UR STRIP.AUTO: 1.01 (ref 1–1.03)
SPECIMEN OUTDATE: NORMAL
SQUAMOUS #/AREA URNS LPF: ABNORMAL /HPF
TDI LATERAL: 0.15 M/S
TDI SEPTAL: 0.05 M/S
TDI: 0.1 M/S
TR MAX PG: 29 MMHG
TRICUSPID ANNULAR PLANE SYSTOLIC EXCURSION: 1.41 CM
TSH SERPL-ACNC: 0.99 UIU/ML (ref 0.35–4.94)
TV REST PULMONARY ARTERY PRESSURE: 44 MMHG
URATE SERPL-MCNC: 5.2 MG/DL (ref 3.5–7.2)
URINE PROTEIN/CREATININE RATIO (OLG): 0.4
UROBILINOGEN UR STRIP-ACNC: NORMAL
WBC # BLD AUTO: 33.51 X10(3)/MCL (ref 4.5–11.5)
WBC # FLD AUTO: 398 /UL
WBC #/AREA URNS AUTO: ABNORMAL /HPF
Z-SCORE OF LEFT VENTRICULAR DIMENSION IN END DIASTOLE: -2.4
Z-SCORE OF LEFT VENTRICULAR DIMENSION IN END SYSTOLE: 1.09

## 2024-10-22 PROCEDURE — 63600175 PHARM REV CODE 636 W HCPCS: Performed by: INTERNAL MEDICINE

## 2024-10-22 PROCEDURE — 85025 COMPLETE CBC W/AUTO DIFF WBC: CPT | Performed by: INTERNAL MEDICINE

## 2024-10-22 PROCEDURE — 27000221 HC OXYGEN, UP TO 24 HOURS

## 2024-10-22 PROCEDURE — 0W993ZZ DRAINAGE OF RIGHT PLEURAL CAVITY, PERCUTANEOUS APPROACH: ICD-10-PCS | Performed by: RADIOLOGY

## 2024-10-22 PROCEDURE — 80053 COMPREHEN METABOLIC PANEL: CPT | Performed by: INTERNAL MEDICINE

## 2024-10-22 PROCEDURE — 99900035 HC TECH TIME PER 15 MIN (STAT)

## 2024-10-22 PROCEDURE — 86901 BLOOD TYPING SEROLOGIC RH(D): CPT | Performed by: INTERNAL MEDICINE

## 2024-10-22 PROCEDURE — 83615 LACTATE (LD) (LDH) ENZYME: CPT | Performed by: INTERNAL MEDICINE

## 2024-10-22 PROCEDURE — 82150 ASSAY OF AMYLASE: CPT | Performed by: INTERNAL MEDICINE

## 2024-10-22 PROCEDURE — 81001 URINALYSIS AUTO W/SCOPE: CPT | Performed by: INTERNAL MEDICINE

## 2024-10-22 PROCEDURE — 25000003 PHARM REV CODE 250: Performed by: INTERNAL MEDICINE

## 2024-10-22 PROCEDURE — 87070 CULTURE OTHR SPECIMN AEROBIC: CPT | Performed by: INTERNAL MEDICINE

## 2024-10-22 PROCEDURE — 84550 ASSAY OF BLOOD/URIC ACID: CPT | Performed by: INTERNAL MEDICINE

## 2024-10-22 PROCEDURE — 96376 TX/PRO/DX INJ SAME DRUG ADON: CPT

## 2024-10-22 PROCEDURE — 85384 FIBRINOGEN ACTIVITY: CPT | Performed by: INTERNAL MEDICINE

## 2024-10-22 PROCEDURE — 84157 ASSAY OF PROTEIN OTHER: CPT | Performed by: INTERNAL MEDICINE

## 2024-10-22 PROCEDURE — 84100 ASSAY OF PHOSPHORUS: CPT | Performed by: INTERNAL MEDICINE

## 2024-10-22 PROCEDURE — 99222 1ST HOSP IP/OBS MODERATE 55: CPT | Mod: ,,, | Performed by: INTERNAL MEDICINE

## 2024-10-22 PROCEDURE — 82570 ASSAY OF URINE CREATININE: CPT | Performed by: INTERNAL MEDICINE

## 2024-10-22 PROCEDURE — 25500020 PHARM REV CODE 255: Performed by: INTERNAL MEDICINE

## 2024-10-22 PROCEDURE — 82945 GLUCOSE OTHER FLUID: CPT | Performed by: INTERNAL MEDICINE

## 2024-10-22 PROCEDURE — 84478 ASSAY OF TRIGLYCERIDES: CPT | Performed by: INTERNAL MEDICINE

## 2024-10-22 PROCEDURE — 83735 ASSAY OF MAGNESIUM: CPT | Performed by: INTERNAL MEDICINE

## 2024-10-22 PROCEDURE — 99900031 HC PATIENT EDUCATION (STAT)

## 2024-10-22 PROCEDURE — 84300 ASSAY OF URINE SODIUM: CPT | Performed by: INTERNAL MEDICINE

## 2024-10-22 PROCEDURE — 84156 ASSAY OF PROTEIN URINE: CPT | Performed by: INTERNAL MEDICINE

## 2024-10-22 PROCEDURE — 89051 BODY FLUID CELL COUNT: CPT | Performed by: INTERNAL MEDICINE

## 2024-10-22 PROCEDURE — 21400001 HC TELEMETRY ROOM

## 2024-10-22 PROCEDURE — 88305 TISSUE EXAM BY PATHOLOGIST: CPT | Performed by: INTERNAL MEDICINE

## 2024-10-22 PROCEDURE — 85730 THROMBOPLASTIN TIME PARTIAL: CPT | Performed by: INTERNAL MEDICINE

## 2024-10-22 PROCEDURE — 36415 COLL VENOUS BLD VENIPUNCTURE: CPT | Performed by: INTERNAL MEDICINE

## 2024-10-22 PROCEDURE — 99285 EMERGENCY DEPT VISIT HI MDM: CPT | Mod: 25

## 2024-10-22 PROCEDURE — 94660 CPAP INITIATION&MGMT: CPT

## 2024-10-22 PROCEDURE — 85027 COMPLETE CBC AUTOMATED: CPT | Performed by: INTERNAL MEDICINE

## 2024-10-22 PROCEDURE — 87220 TISSUE EXAM FOR FUNGI: CPT | Performed by: INTERNAL MEDICINE

## 2024-10-22 PROCEDURE — 84443 ASSAY THYROID STIM HORMONE: CPT | Performed by: INTERNAL MEDICINE

## 2024-10-22 PROCEDURE — 88112 CYTOPATH CELL ENHANCE TECH: CPT

## 2024-10-22 PROCEDURE — 85610 PROTHROMBIN TIME: CPT | Performed by: INTERNAL MEDICINE

## 2024-10-22 RX ORDER — LIOTHYRONINE SODIUM 5 UG/1
2.5 TABLET ORAL 2 TIMES DAILY
COMMUNITY
Start: 2024-08-08

## 2024-10-22 RX ORDER — TAMSULOSIN HYDROCHLORIDE 0.4 MG/1
1 CAPSULE ORAL NIGHTLY
Status: DISCONTINUED | OUTPATIENT
Start: 2024-10-22 | End: 2024-10-25 | Stop reason: HOSPADM

## 2024-10-22 RX ORDER — GLUCAGON 1 MG
1 KIT INJECTION
Status: DISCONTINUED | OUTPATIENT
Start: 2024-10-22 | End: 2024-10-25 | Stop reason: HOSPADM

## 2024-10-22 RX ORDER — MUPIROCIN 20 MG/G
OINTMENT TOPICAL 2 TIMES DAILY
Status: DISCONTINUED | OUTPATIENT
Start: 2024-10-22 | End: 2024-10-25 | Stop reason: HOSPADM

## 2024-10-22 RX ORDER — LEVOTHYROXINE SODIUM 25 UG/1
25 TABLET ORAL
Status: DISCONTINUED | OUTPATIENT
Start: 2024-10-22 | End: 2024-10-25 | Stop reason: HOSPADM

## 2024-10-22 RX ORDER — IBUPROFEN 200 MG
16 TABLET ORAL
Status: DISCONTINUED | OUTPATIENT
Start: 2024-10-22 | End: 2024-10-25 | Stop reason: HOSPADM

## 2024-10-22 RX ORDER — AMOXICILLIN 250 MG
2 CAPSULE ORAL 2 TIMES DAILY PRN
Status: DISCONTINUED | OUTPATIENT
Start: 2024-10-22 | End: 2024-10-25 | Stop reason: HOSPADM

## 2024-10-22 RX ORDER — METOLAZONE 2.5 MG/1
10 TABLET ORAL ONCE
Status: COMPLETED | OUTPATIENT
Start: 2024-10-22 | End: 2024-10-22

## 2024-10-22 RX ORDER — ACETAMINOPHEN 325 MG/1
650 TABLET ORAL EVERY 4 HOURS PRN
Status: DISCONTINUED | OUTPATIENT
Start: 2024-10-22 | End: 2024-10-25 | Stop reason: HOSPADM

## 2024-10-22 RX ORDER — PROCHLORPERAZINE EDISYLATE 5 MG/ML
5 INJECTION INTRAMUSCULAR; INTRAVENOUS EVERY 6 HOURS PRN
Status: DISCONTINUED | OUTPATIENT
Start: 2024-10-22 | End: 2024-10-25 | Stop reason: HOSPADM

## 2024-10-22 RX ORDER — FUROSEMIDE 10 MG/ML
40 INJECTION INTRAMUSCULAR; INTRAVENOUS EVERY 12 HOURS
Status: DISCONTINUED | OUTPATIENT
Start: 2024-10-22 | End: 2024-10-25

## 2024-10-22 RX ORDER — FUROSEMIDE 10 MG/ML
80 INJECTION INTRAMUSCULAR; INTRAVENOUS ONCE
Status: COMPLETED | OUTPATIENT
Start: 2024-10-22 | End: 2024-10-22

## 2024-10-22 RX ORDER — TORSEMIDE 20 MG/1
20 TABLET ORAL DAILY
Status: ON HOLD | COMMUNITY
Start: 2024-10-21 | End: 2024-10-25 | Stop reason: HOSPADM

## 2024-10-22 RX ORDER — ONDANSETRON HYDROCHLORIDE 2 MG/ML
4 INJECTION, SOLUTION INTRAVENOUS EVERY 4 HOURS PRN
Status: DISCONTINUED | OUTPATIENT
Start: 2024-10-22 | End: 2024-10-25 | Stop reason: HOSPADM

## 2024-10-22 RX ORDER — POLYETHYLENE GLYCOL 3350 17 G/17G
17 POWDER, FOR SOLUTION ORAL 2 TIMES DAILY PRN
Status: DISCONTINUED | OUTPATIENT
Start: 2024-10-22 | End: 2024-10-25 | Stop reason: HOSPADM

## 2024-10-22 RX ORDER — FUROSEMIDE 10 MG/ML
80 INJECTION INTRAMUSCULAR; INTRAVENOUS EVERY 12 HOURS
Status: DISCONTINUED | OUTPATIENT
Start: 2024-10-22 | End: 2024-10-22

## 2024-10-22 RX ORDER — IBUPROFEN 200 MG
24 TABLET ORAL
Status: DISCONTINUED | OUTPATIENT
Start: 2024-10-22 | End: 2024-10-25 | Stop reason: HOSPADM

## 2024-10-22 RX ORDER — ALUMINUM HYDROXIDE, MAGNESIUM HYDROXIDE, AND SIMETHICONE 1200; 120; 1200 MG/30ML; MG/30ML; MG/30ML
30 SUSPENSION ORAL 4 TIMES DAILY PRN
Status: DISCONTINUED | OUTPATIENT
Start: 2024-10-22 | End: 2024-10-25 | Stop reason: HOSPADM

## 2024-10-22 RX ORDER — INSULIN ASPART 100 [IU]/ML
1-10 INJECTION, SOLUTION INTRAVENOUS; SUBCUTANEOUS
Status: DISCONTINUED | OUTPATIENT
Start: 2024-10-22 | End: 2024-10-25 | Stop reason: HOSPADM

## 2024-10-22 RX ORDER — ENOXAPARIN SODIUM 100 MG/ML
40 INJECTION SUBCUTANEOUS EVERY 24 HOURS
Status: DISCONTINUED | OUTPATIENT
Start: 2024-10-22 | End: 2024-10-22

## 2024-10-22 RX ORDER — METOPROLOL TARTRATE 50 MG/1
50 TABLET ORAL 2 TIMES DAILY
Status: DISCONTINUED | OUTPATIENT
Start: 2024-10-22 | End: 2024-10-24

## 2024-10-22 RX ORDER — GABAPENTIN 300 MG/1
300 CAPSULE ORAL 3 TIMES DAILY
Status: DISCONTINUED | OUTPATIENT
Start: 2024-10-22 | End: 2024-10-25 | Stop reason: HOSPADM

## 2024-10-22 RX ORDER — CETIRIZINE HYDROCHLORIDE 10 MG/1
10 TABLET ORAL DAILY
Status: DISCONTINUED | OUTPATIENT
Start: 2024-10-22 | End: 2024-10-25 | Stop reason: HOSPADM

## 2024-10-22 RX ORDER — LIOTHYRONINE SODIUM 5 UG/1
5 TABLET ORAL DAILY
Status: DISCONTINUED | OUTPATIENT
Start: 2024-10-22 | End: 2024-10-25 | Stop reason: HOSPADM

## 2024-10-22 RX ORDER — BUMETANIDE 1 MG/1
1 TABLET ORAL EVERY MORNING
COMMUNITY

## 2024-10-22 RX ORDER — PRAVASTATIN SODIUM 10 MG/1
10 TABLET ORAL NIGHTLY
Status: DISCONTINUED | OUTPATIENT
Start: 2024-10-22 | End: 2024-10-25 | Stop reason: HOSPADM

## 2024-10-22 RX ORDER — ALLOPURINOL 300 MG/1
300 TABLET ORAL DAILY
Status: DISCONTINUED | OUTPATIENT
Start: 2024-10-22 | End: 2024-10-25 | Stop reason: HOSPADM

## 2024-10-22 RX ADMIN — INSULIN ASPART 4 UNITS: 100 INJECTION, SOLUTION INTRAVENOUS; SUBCUTANEOUS at 04:10

## 2024-10-22 RX ADMIN — ACETAMINOPHEN 650 MG: 325 TABLET, FILM COATED ORAL at 06:10

## 2024-10-22 RX ADMIN — MUPIROCIN: 20 OINTMENT TOPICAL at 07:10

## 2024-10-22 RX ADMIN — MUPIROCIN: 20 OINTMENT TOPICAL at 10:10

## 2024-10-22 RX ADMIN — METOLAZONE 10 MG: 2.5 TABLET ORAL at 02:10

## 2024-10-22 RX ADMIN — GABAPENTIN 300 MG: 300 CAPSULE ORAL at 04:10

## 2024-10-22 RX ADMIN — FUROSEMIDE 80 MG: 10 INJECTION, SOLUTION INTRAMUSCULAR; INTRAVENOUS at 03:10

## 2024-10-22 RX ADMIN — METOPROLOL TARTRATE 50 MG: 50 TABLET, FILM COATED ORAL at 07:10

## 2024-10-22 RX ADMIN — GABAPENTIN 300 MG: 300 CAPSULE ORAL at 10:10

## 2024-10-22 RX ADMIN — GABAPENTIN 300 MG: 300 CAPSULE ORAL at 07:10

## 2024-10-22 RX ADMIN — ALLOPURINOL 300 MG: 300 TABLET ORAL at 10:10

## 2024-10-22 RX ADMIN — CETIRIZINE HYDROCHLORIDE 10 MG: 10 TABLET, FILM COATED ORAL at 10:10

## 2024-10-22 RX ADMIN — FUROSEMIDE 40 MG: 10 INJECTION, SOLUTION INTRAMUSCULAR; INTRAVENOUS at 07:10

## 2024-10-22 RX ADMIN — LIOTHYRONINE SODIUM 5 MCG: 5 TABLET ORAL at 10:10

## 2024-10-22 RX ADMIN — TAMSULOSIN HYDROCHLORIDE 0.4 MG: 0.4 CAPSULE ORAL at 07:10

## 2024-10-22 RX ADMIN — PRAVASTATIN SODIUM 10 MG: 10 TABLET ORAL at 07:10

## 2024-10-22 RX ADMIN — SODIUM ZIRCONIUM CYCLOSILICATE 10 G: 10 POWDER, FOR SUSPENSION ORAL at 10:10

## 2024-10-22 RX ADMIN — METOPROLOL TARTRATE 50 MG: 50 TABLET, FILM COATED ORAL at 10:10

## 2024-10-22 RX ADMIN — INSULIN ASPART 2 UNITS: 100 INJECTION, SOLUTION INTRAVENOUS; SUBCUTANEOUS at 11:10

## 2024-10-22 RX ADMIN — TAMSULOSIN HYDROCHLORIDE 0.4 MG: 0.4 CAPSULE ORAL at 02:10

## 2024-10-22 RX ADMIN — FUROSEMIDE 80 MG: 10 INJECTION, SOLUTION INTRAMUSCULAR; INTRAVENOUS at 10:10

## 2024-10-22 RX ADMIN — PERFLUTREN 1 ML: 6.52 INJECTION, SUSPENSION INTRAVENOUS at 06:10

## 2024-10-23 ENCOUNTER — PATIENT MESSAGE (OUTPATIENT)
Dept: RESEARCH | Facility: HOSPITAL | Age: 79
End: 2024-10-23
Payer: MEDICARE

## 2024-10-23 LAB
ABS NEUT (OLG): 11.33 X10(3)/MCL (ref 2.1–9.2)
ALBUMIN SERPL-MCNC: 2.8 G/DL (ref 3.4–4.8)
ALBUMIN/GLOB SERPL: 0.7 RATIO (ref 1.1–2)
ALP SERPL-CCNC: 107 UNIT/L (ref 40–150)
ALT SERPL-CCNC: 8 UNIT/L (ref 0–55)
ANION GAP SERPL CALC-SCNC: 10 MEQ/L
ANISOCYTOSIS BLD QL SMEAR: ABNORMAL
AST SERPL-CCNC: 16 UNIT/L (ref 5–34)
BILIRUB SERPL-MCNC: 0.7 MG/DL
BODY FLD TYPE: NORMAL
BUN SERPL-MCNC: 56.7 MG/DL (ref 8.4–25.7)
CALCIUM SERPL-MCNC: 8 MG/DL (ref 8.8–10)
CHLORIDE SERPL-SCNC: 98 MMOL/L (ref 98–107)
CO2 SERPL-SCNC: 28 MMOL/L (ref 23–31)
COLOR STL: NORMAL
CONSISTENCY STL: NORMAL
CREAT SERPL-MCNC: 1.56 MG/DL (ref 0.72–1.25)
CREAT/UREA NIT SERPL: 36
ERYTHROCYTE [DISTWIDTH] IN BLOOD BY AUTOMATED COUNT: 21.2 % (ref 11.5–17)
FIBRINOGEN PPP-MCNC: 190 MG/DL (ref 210–463)
GFR SERPLBLD CREATININE-BSD FMLA CKD-EPI: 45 ML/MIN/1.73/M2
GLOBULIN SER-MCNC: 3.9 GM/DL (ref 2.4–3.5)
GLUCOSE SERPL-MCNC: 116 MG/DL (ref 82–115)
HCT VFR BLD AUTO: 23.5 % (ref 42–52)
HEMOCCULT SP1 STL QL: NEGATIVE
HGB BLD-MCNC: 7.2 G/DL (ref 14–18)
INR PPP: 1.5
INSTRUMENT WBC (OLG): 29.04 X10(3)/MCL
LYMPHOCYTES NFR BLD MANUAL: 21 %
LYMPHOCYTES NFR BLD MANUAL: 6.1 X10(3)/MCL
MACROCYTES BLD QL SMEAR: ABNORMAL
MAGNESIUM SERPL-MCNC: 1.9 MG/DL (ref 1.6–2.6)
MCH RBC QN AUTO: 44.7 PG (ref 27–31)
MCHC RBC AUTO-ENTMCNC: 30.6 G/DL (ref 33–36)
MCV RBC AUTO: 146 FL (ref 80–94)
METAMYELOCYTES NFR BLD MANUAL: 3 %
MONOCYTES NFR BLD MANUAL: 25 %
MONOCYTES NFR BLD MANUAL: 7.26 X10(3)/MCL (ref 0.1–1.3)
MYELOCYTES NFR BLD MANUAL: 10 %
NEUTROPHILS NFR BLD MANUAL: 39 %
NRBC BLD AUTO-RTO: 10.4 %
NRBC BLD MANUAL-RTO: 22 %
PHOSPHATE SERPL-MCNC: 5.4 MG/DL (ref 2.3–4.7)
PLATELET # BLD AUTO: 106 X10(3)/MCL (ref 130–400)
PLATELET # BLD EST: ABNORMAL 10*3/UL
PLATELETS.RETICULATED NFR BLD AUTO: 23.2 % (ref 0.9–11.2)
PMV BLD AUTO: 13.8 FL (ref 7.4–10.4)
POCT GLUCOSE: 117 MG/DL (ref 70–110)
POCT GLUCOSE: 243 MG/DL (ref 70–110)
POCT GLUCOSE: 279 MG/DL (ref 70–110)
POIKILOCYTOSIS BLD QL SMEAR: ABNORMAL
POLYCHROMASIA BLD QL SMEAR: ABNORMAL
POTASSIUM SERPL-SCNC: 5 MMOL/L (ref 3.5–5.1)
PROMYELOCYTES # BLD MANUAL: 3 %
PROT SERPL-MCNC: 6.7 GM/DL (ref 5.8–7.6)
PROTHROMBIN TIME: 18.4 SECONDS (ref 12.5–14.5)
PSYCHE PATHOLOGY RESULT: NORMAL
RBC # BLD AUTO: 1.61 X10(6)/MCL (ref 4.7–6.1)
RBC MORPH BLD: ABNORMAL
SODIUM SERPL-SCNC: 136 MMOL/L (ref 136–145)
TARGETS BLD QL SMEAR: ABNORMAL
TRIGL FLD-MCNC: 23 MG/DL
WBC # BLD AUTO: 29.04 X10(3)/MCL (ref 4.5–11.5)

## 2024-10-23 PROCEDURE — 85007 BL SMEAR W/DIFF WBC COUNT: CPT | Performed by: INTERNAL MEDICINE

## 2024-10-23 PROCEDURE — 82272 OCCULT BLD FECES 1-3 TESTS: CPT | Performed by: INTERNAL MEDICINE

## 2024-10-23 PROCEDURE — 25000003 PHARM REV CODE 250: Performed by: INTERNAL MEDICINE

## 2024-10-23 PROCEDURE — 80053 COMPREHEN METABOLIC PANEL: CPT | Performed by: INTERNAL MEDICINE

## 2024-10-23 PROCEDURE — 84100 ASSAY OF PHOSPHORUS: CPT | Performed by: INTERNAL MEDICINE

## 2024-10-23 PROCEDURE — 85610 PROTHROMBIN TIME: CPT | Performed by: INTERNAL MEDICINE

## 2024-10-23 PROCEDURE — 99900035 HC TECH TIME PER 15 MIN (STAT)

## 2024-10-23 PROCEDURE — 83735 ASSAY OF MAGNESIUM: CPT | Performed by: INTERNAL MEDICINE

## 2024-10-23 PROCEDURE — 36415 COLL VENOUS BLD VENIPUNCTURE: CPT | Performed by: INTERNAL MEDICINE

## 2024-10-23 PROCEDURE — 85384 FIBRINOGEN ACTIVITY: CPT | Performed by: INTERNAL MEDICINE

## 2024-10-23 PROCEDURE — 99232 SBSQ HOSP IP/OBS MODERATE 35: CPT | Mod: ,,, | Performed by: INTERNAL MEDICINE

## 2024-10-23 PROCEDURE — 63600175 PHARM REV CODE 636 W HCPCS: Performed by: INTERNAL MEDICINE

## 2024-10-23 PROCEDURE — 21400001 HC TELEMETRY ROOM

## 2024-10-23 PROCEDURE — 27000221 HC OXYGEN, UP TO 24 HOURS

## 2024-10-23 RX ADMIN — INSULIN ASPART 6 UNITS: 100 INJECTION, SOLUTION INTRAVENOUS; SUBCUTANEOUS at 08:10

## 2024-10-23 RX ADMIN — LEVOTHYROXINE SODIUM 25 MCG: 25 TABLET ORAL at 03:10

## 2024-10-23 RX ADMIN — GABAPENTIN 300 MG: 300 CAPSULE ORAL at 03:10

## 2024-10-23 RX ADMIN — GABAPENTIN 300 MG: 300 CAPSULE ORAL at 09:10

## 2024-10-23 RX ADMIN — GABAPENTIN 300 MG: 300 CAPSULE ORAL at 08:10

## 2024-10-23 RX ADMIN — MUPIROCIN: 20 OINTMENT TOPICAL at 09:10

## 2024-10-23 RX ADMIN — CETIRIZINE HYDROCHLORIDE 10 MG: 10 TABLET, FILM COATED ORAL at 09:10

## 2024-10-23 RX ADMIN — FUROSEMIDE 40 MG: 10 INJECTION, SOLUTION INTRAMUSCULAR; INTRAVENOUS at 09:10

## 2024-10-23 RX ADMIN — METOPROLOL TARTRATE 50 MG: 50 TABLET, FILM COATED ORAL at 08:10

## 2024-10-23 RX ADMIN — LIOTHYRONINE SODIUM 5 MCG: 5 TABLET ORAL at 09:10

## 2024-10-23 RX ADMIN — ALLOPURINOL 300 MG: 300 TABLET ORAL at 09:10

## 2024-10-23 RX ADMIN — PRAVASTATIN SODIUM 10 MG: 10 TABLET ORAL at 08:10

## 2024-10-23 RX ADMIN — TAMSULOSIN HYDROCHLORIDE 0.4 MG: 0.4 CAPSULE ORAL at 08:10

## 2024-10-23 RX ADMIN — MUPIROCIN: 20 OINTMENT TOPICAL at 08:10

## 2024-10-23 RX ADMIN — METOPROLOL TARTRATE 50 MG: 50 TABLET, FILM COATED ORAL at 09:10

## 2024-10-23 RX ADMIN — FUROSEMIDE 40 MG: 10 INJECTION, SOLUTION INTRAMUSCULAR; INTRAVENOUS at 08:10

## 2024-10-24 LAB
ALBUMIN SERPL-MCNC: 2.8 G/DL (ref 3.4–4.8)
ALBUMIN/GLOB SERPL: 0.7 RATIO (ref 1.1–2)
ALP SERPL-CCNC: 119 UNIT/L (ref 40–150)
ALT SERPL-CCNC: 9 UNIT/L (ref 0–55)
ANION GAP SERPL CALC-SCNC: 8 MEQ/L
AST SERPL-CCNC: 17 UNIT/L (ref 5–34)
BILIRUB SERPL-MCNC: 0.7 MG/DL
BUN SERPL-MCNC: 56.7 MG/DL (ref 8.4–25.7)
CALCIUM SERPL-MCNC: 7.7 MG/DL (ref 8.8–10)
CHLORIDE SERPL-SCNC: 98 MMOL/L (ref 98–107)
CO2 SERPL-SCNC: 28 MMOL/L (ref 23–31)
CREAT SERPL-MCNC: 1.31 MG/DL (ref 0.72–1.25)
CREAT/UREA NIT SERPL: 43
ERYTHROCYTE [DISTWIDTH] IN BLOOD BY AUTOMATED COUNT: 21.2 % (ref 11.5–17)
FIBRINOGEN PPP-MCNC: 200 MG/DL (ref 210–463)
GFR SERPLBLD CREATININE-BSD FMLA CKD-EPI: 56 ML/MIN/1.73/M2
GLOBULIN SER-MCNC: 3.9 GM/DL (ref 2.4–3.5)
GLUCOSE SERPL-MCNC: 150 MG/DL (ref 82–115)
HCT VFR BLD AUTO: 23.5 % (ref 42–52)
HGB BLD-MCNC: 7.2 G/DL (ref 14–18)
INR PPP: 1.5
MAGNESIUM SERPL-MCNC: 1.8 MG/DL (ref 1.6–2.6)
MCH RBC QN AUTO: 44.2 PG (ref 27–31)
MCHC RBC AUTO-ENTMCNC: 30.6 G/DL (ref 33–36)
MCV RBC AUTO: 144.2 FL (ref 80–94)
NRBC BLD AUTO-RTO: 10.4 %
PHOSPHATE SERPL-MCNC: 4.4 MG/DL (ref 2.3–4.7)
PLATELET # BLD AUTO: 95 X10(3)/MCL (ref 130–400)
PLATELETS.RETICULATED NFR BLD AUTO: 24.7 % (ref 0.9–11.2)
PMV BLD AUTO: 14.3 FL (ref 7.4–10.4)
POCT GLUCOSE: 162 MG/DL (ref 70–110)
POCT GLUCOSE: 222 MG/DL (ref 70–110)
POCT GLUCOSE: 277 MG/DL (ref 70–110)
POTASSIUM SERPL-SCNC: 4.6 MMOL/L (ref 3.5–5.1)
PROT SERPL-MCNC: 6.7 GM/DL (ref 5.8–7.6)
PROTHROMBIN TIME: 18.3 SECONDS (ref 12.5–14.5)
RBC # BLD AUTO: 1.63 X10(6)/MCL (ref 4.7–6.1)
SODIUM SERPL-SCNC: 134 MMOL/L (ref 136–145)
WBC # BLD AUTO: 32.62 X10(3)/MCL (ref 4.5–11.5)

## 2024-10-24 PROCEDURE — 84100 ASSAY OF PHOSPHORUS: CPT

## 2024-10-24 PROCEDURE — 97162 PT EVAL MOD COMPLEX 30 MIN: CPT

## 2024-10-24 PROCEDURE — 27000221 HC OXYGEN, UP TO 24 HOURS

## 2024-10-24 PROCEDURE — 25000003 PHARM REV CODE 250: Performed by: INTERNAL MEDICINE

## 2024-10-24 PROCEDURE — 36415 COLL VENOUS BLD VENIPUNCTURE: CPT | Performed by: INTERNAL MEDICINE

## 2024-10-24 PROCEDURE — 25000003 PHARM REV CODE 250

## 2024-10-24 PROCEDURE — 21400001 HC TELEMETRY ROOM

## 2024-10-24 PROCEDURE — 36415 COLL VENOUS BLD VENIPUNCTURE: CPT | Performed by: STUDENT IN AN ORGANIZED HEALTH CARE EDUCATION/TRAINING PROGRAM

## 2024-10-24 PROCEDURE — 97535 SELF CARE MNGMENT TRAINING: CPT

## 2024-10-24 PROCEDURE — 85027 COMPLETE CBC AUTOMATED: CPT | Performed by: STUDENT IN AN ORGANIZED HEALTH CARE EDUCATION/TRAINING PROGRAM

## 2024-10-24 PROCEDURE — 99900031 HC PATIENT EDUCATION (STAT)

## 2024-10-24 PROCEDURE — 83735 ASSAY OF MAGNESIUM: CPT

## 2024-10-24 PROCEDURE — 63600175 PHARM REV CODE 636 W HCPCS: Performed by: INTERNAL MEDICINE

## 2024-10-24 PROCEDURE — 80053 COMPREHEN METABOLIC PANEL: CPT

## 2024-10-24 PROCEDURE — 97166 OT EVAL MOD COMPLEX 45 MIN: CPT

## 2024-10-24 PROCEDURE — 85610 PROTHROMBIN TIME: CPT | Performed by: INTERNAL MEDICINE

## 2024-10-24 PROCEDURE — 94760 N-INVAS EAR/PLS OXIMETRY 1: CPT

## 2024-10-24 PROCEDURE — 85384 FIBRINOGEN ACTIVITY: CPT | Performed by: INTERNAL MEDICINE

## 2024-10-24 PROCEDURE — 36415 COLL VENOUS BLD VENIPUNCTURE: CPT

## 2024-10-24 PROCEDURE — 25000003 PHARM REV CODE 250: Performed by: STUDENT IN AN ORGANIZED HEALTH CARE EDUCATION/TRAINING PROGRAM

## 2024-10-24 RX ORDER — METOPROLOL TARTRATE 25 MG/1
25 TABLET, FILM COATED ORAL 2 TIMES DAILY
Status: DISCONTINUED | OUTPATIENT
Start: 2024-10-24 | End: 2024-10-25 | Stop reason: HOSPADM

## 2024-10-24 RX ADMIN — Medication 6 MG: at 07:10

## 2024-10-24 RX ADMIN — LEVOTHYROXINE SODIUM 25 MCG: 25 TABLET ORAL at 04:10

## 2024-10-24 RX ADMIN — INSULIN ASPART 6 UNITS: 100 INJECTION, SOLUTION INTRAVENOUS; SUBCUTANEOUS at 07:10

## 2024-10-24 RX ADMIN — FUROSEMIDE 40 MG: 10 INJECTION, SOLUTION INTRAMUSCULAR; INTRAVENOUS at 07:10

## 2024-10-24 RX ADMIN — GABAPENTIN 300 MG: 300 CAPSULE ORAL at 05:10

## 2024-10-24 RX ADMIN — MUPIROCIN: 20 OINTMENT TOPICAL at 09:10

## 2024-10-24 RX ADMIN — GABAPENTIN 300 MG: 300 CAPSULE ORAL at 07:10

## 2024-10-24 RX ADMIN — TAMSULOSIN HYDROCHLORIDE 0.4 MG: 0.4 CAPSULE ORAL at 07:10

## 2024-10-24 RX ADMIN — METOPROLOL TARTRATE 25 MG: 50 TABLET, FILM COATED ORAL at 07:10

## 2024-10-24 RX ADMIN — PRAVASTATIN SODIUM 10 MG: 10 TABLET ORAL at 07:10

## 2024-10-24 RX ADMIN — ALLOPURINOL 300 MG: 300 TABLET ORAL at 09:10

## 2024-10-24 RX ADMIN — CETIRIZINE HYDROCHLORIDE 10 MG: 10 TABLET, FILM COATED ORAL at 09:10

## 2024-10-24 RX ADMIN — LIOTHYRONINE SODIUM 5 MCG: 5 TABLET ORAL at 09:10

## 2024-10-24 RX ADMIN — GABAPENTIN 300 MG: 300 CAPSULE ORAL at 09:10

## 2024-10-24 RX ADMIN — INSULIN ASPART 2 UNITS: 100 INJECTION, SOLUTION INTRAVENOUS; SUBCUTANEOUS at 04:10

## 2024-10-24 RX ADMIN — METOPROLOL TARTRATE 25 MG: 50 TABLET, FILM COATED ORAL at 09:10

## 2024-10-24 RX ADMIN — FUROSEMIDE 40 MG: 10 INJECTION, SOLUTION INTRAMUSCULAR; INTRAVENOUS at 09:10

## 2024-10-25 VITALS
RESPIRATION RATE: 20 BRPM | HEIGHT: 74 IN | WEIGHT: 211.63 LBS | SYSTOLIC BLOOD PRESSURE: 128 MMHG | HEART RATE: 96 BPM | OXYGEN SATURATION: 93 % | TEMPERATURE: 98 F | BODY MASS INDEX: 27.16 KG/M2 | DIASTOLIC BLOOD PRESSURE: 60 MMHG

## 2024-10-25 LAB
ALLENS TEST BLOOD GAS (OHS): YES
ANION GAP SERPL CALC-SCNC: 7 MEQ/L
BASE EXCESS BLD CALC-SCNC: 10.4 MMOL/L (ref -2–2)
BLOOD GAS SAMPLE TYPE (OHS): ABNORMAL
BUN SERPL-MCNC: 59.9 MG/DL (ref 8.4–25.7)
CA-I BLD-SCNC: 1.12 MMOL/L (ref 1.12–1.23)
CALCIUM SERPL-MCNC: 7.7 MG/DL (ref 8.8–10)
CHLORIDE SERPL-SCNC: 99 MMOL/L (ref 98–107)
CO2 BLDA-SCNC: 37.5 MMOL/L
CO2 SERPL-SCNC: 28 MMOL/L (ref 23–31)
COHGB MFR BLDA: 2.3 % (ref 0.5–1.5)
CREAT SERPL-MCNC: 1.26 MG/DL (ref 0.72–1.25)
CREAT/UREA NIT SERPL: 48
D DIMER PPP IA.FEU-MCNC: 2.9 UG/ML FEU (ref 0–0.5)
DRAWN BY BLOOD GAS (OHS): ABNORMAL
ERYTHROCYTE [DISTWIDTH] IN BLOOD BY AUTOMATED COUNT: 20.7 % (ref 11.5–17)
FIBRINOGEN PPP-MCNC: 237 MG/DL (ref 210–463)
GFR SERPLBLD CREATININE-BSD FMLA CKD-EPI: 58 ML/MIN/1.73/M2
GLUCOSE SERPL-MCNC: 175 MG/DL (ref 82–115)
GROUP & RH: NORMAL
HCO3 BLDA-SCNC: 35.8 MMOL/L (ref 22–26)
HCT VFR BLD AUTO: 23.7 % (ref 42–52)
HGB BLD-MCNC: 7.3 G/DL (ref 14–18)
INDIRECT COOMBS: NORMAL
INR PPP: 1.5
LACTATE SERPL-SCNC: 1.9 MMOL/L (ref 0.5–2.2)
LPM (OHS): 2.5
MCH RBC QN AUTO: 44.2 PG (ref 27–31)
MCHC RBC AUTO-ENTMCNC: 30.8 G/DL (ref 33–36)
MCV RBC AUTO: 143.6 FL (ref 80–94)
METHGB MFR BLDA: 0.6 % (ref 0.4–1.5)
NRBC BLD AUTO-RTO: 7.8 %
O2 HB BLOOD GAS (OHS): 91.9 % (ref 94–97)
OXYGEN DEVICE BLOOD GAS (OHS): ABNORMAL
OXYHGB MFR BLDA: 7.4 G/DL (ref 12–16)
PCO2 BLDA: 54 MMHG (ref 35–45)
PH BLDA: 7.43 [PH] (ref 7.35–7.45)
PLATELET # BLD AUTO: 92 X10(3)/MCL (ref 130–400)
PLATELETS.RETICULATED NFR BLD AUTO: 23.9 % (ref 0.9–11.2)
PMV BLD AUTO: 13.2 FL (ref 7.4–10.4)
PO2 BLDA: 58 MMHG (ref 80–100)
POCT GLUCOSE: 170 MG/DL (ref 70–110)
POTASSIUM BLOOD GAS (OHS): 4.1 MMOL/L (ref 3.5–5)
POTASSIUM SERPL-SCNC: 4.3 MMOL/L (ref 3.5–5.1)
PROTHROMBIN TIME: 17.4 SECONDS (ref 12.5–14.5)
RBC # BLD AUTO: 1.65 X10(6)/MCL (ref 4.7–6.1)
SAMPLE SITE BLOOD GAS (OHS): ABNORMAL
SAO2 % BLDA: 90.5 %
SODIUM BLOOD GAS (OHS): 132 MMOL/L (ref 137–145)
SODIUM SERPL-SCNC: 134 MMOL/L (ref 136–145)
SPECIMEN OUTDATE: NORMAL
WBC # BLD AUTO: 44.98 X10(3)/MCL (ref 4.5–11.5)

## 2024-10-25 PROCEDURE — 94760 N-INVAS EAR/PLS OXIMETRY 1: CPT | Mod: XB

## 2024-10-25 PROCEDURE — 85610 PROTHROMBIN TIME: CPT | Performed by: INTERNAL MEDICINE

## 2024-10-25 PROCEDURE — 86923 COMPATIBILITY TEST ELECTRIC: CPT | Performed by: NURSE PRACTITIONER

## 2024-10-25 PROCEDURE — 94799 UNLISTED PULMONARY SVC/PX: CPT

## 2024-10-25 PROCEDURE — 99900031 HC PATIENT EDUCATION (STAT)

## 2024-10-25 PROCEDURE — 94660 CPAP INITIATION&MGMT: CPT

## 2024-10-25 PROCEDURE — 36600 WITHDRAWAL OF ARTERIAL BLOOD: CPT

## 2024-10-25 PROCEDURE — 86901 BLOOD TYPING SEROLOGIC RH(D): CPT | Performed by: NURSE PRACTITIONER

## 2024-10-25 PROCEDURE — 63600175 PHARM REV CODE 636 W HCPCS: Performed by: INTERNAL MEDICINE

## 2024-10-25 PROCEDURE — 27000221 HC OXYGEN, UP TO 24 HOURS

## 2024-10-25 PROCEDURE — 27000190 HC CPAP FULL FACE MASK W/VALVE

## 2024-10-25 PROCEDURE — 99900035 HC TECH TIME PER 15 MIN (STAT)

## 2024-10-25 PROCEDURE — 82803 BLOOD GASES ANY COMBINATION: CPT

## 2024-10-25 PROCEDURE — 85027 COMPLETE CBC AUTOMATED: CPT | Performed by: STUDENT IN AN ORGANIZED HEALTH CARE EDUCATION/TRAINING PROGRAM

## 2024-10-25 PROCEDURE — 36415 COLL VENOUS BLD VENIPUNCTURE: CPT | Performed by: NURSE PRACTITIONER

## 2024-10-25 PROCEDURE — 83605 ASSAY OF LACTIC ACID: CPT | Performed by: NURSE PRACTITIONER

## 2024-10-25 PROCEDURE — 80048 BASIC METABOLIC PNL TOTAL CA: CPT | Performed by: STUDENT IN AN ORGANIZED HEALTH CARE EDUCATION/TRAINING PROGRAM

## 2024-10-25 PROCEDURE — 86850 RBC ANTIBODY SCREEN: CPT | Performed by: NURSE PRACTITIONER

## 2024-10-25 PROCEDURE — 25000003 PHARM REV CODE 250: Performed by: INTERNAL MEDICINE

## 2024-10-25 PROCEDURE — 27100171 HC OXYGEN HIGH FLOW UP TO 24 HOURS

## 2024-10-25 PROCEDURE — 85384 FIBRINOGEN ACTIVITY: CPT | Performed by: INTERNAL MEDICINE

## 2024-10-25 PROCEDURE — 85379 FIBRIN DEGRADATION QUANT: CPT | Performed by: NURSE PRACTITIONER

## 2024-10-25 PROCEDURE — 86900 BLOOD TYPING SEROLOGIC ABO: CPT | Performed by: NURSE PRACTITIONER

## 2024-10-25 PROCEDURE — 27000249 HC VAPOTHERM CIRCUIT

## 2024-10-25 PROCEDURE — 36415 COLL VENOUS BLD VENIPUNCTURE: CPT | Performed by: INTERNAL MEDICINE

## 2024-10-25 RX ORDER — HYDROCODONE BITARTRATE AND ACETAMINOPHEN 500; 5 MG/1; MG/1
TABLET ORAL
Status: DISCONTINUED | OUTPATIENT
Start: 2024-10-25 | End: 2024-10-25 | Stop reason: HOSPADM

## 2024-10-25 RX ADMIN — INSULIN ASPART 2 UNITS: 100 INJECTION, SOLUTION INTRAVENOUS; SUBCUTANEOUS at 04:10

## 2024-10-25 RX ADMIN — ACETAMINOPHEN 650 MG: 325 TABLET, FILM COATED ORAL at 05:10

## 2024-10-25 RX ADMIN — LEVOTHYROXINE SODIUM 25 MCG: 25 TABLET ORAL at 04:10

## 2024-10-26 LAB
ABO + RH BLD: NORMAL
BLD PROD TYP BPU: NORMAL
BLOOD UNIT EXPIRATION DATE: NORMAL
BLOOD UNIT TYPE CODE: 5100
CROSSMATCH INTERPRETATION: NORMAL
DISPENSE STATUS: NORMAL
UNIT NUMBER: NORMAL

## 2024-10-27 LAB — BACTERIA FLD CULT: NORMAL

## 2024-10-28 ENCOUNTER — TELEPHONE (OUTPATIENT)
Dept: ADMINISTRATIVE | Facility: CLINIC | Age: 79
End: 2024-10-28
Payer: MEDICARE

## 2025-06-11 ENCOUNTER — DOCUMENTATION ONLY (OUTPATIENT)
Dept: SURGICAL ONCOLOGY | Facility: CLINIC | Age: 80
End: 2025-06-11
Payer: MEDICARE

## 2025-06-11 NOTE — PROGRESS NOTES
Reached out to [facility] to clarify if an ultrasound on the right axillary was ever done per last order I faxed in Oct. Carlos Penny there who stated that the last ultrasound right axillary was done in 2023. Will have Kaykay Arnold RN review and let me know if the patient is still needing this done. Cpl

## 2025-06-12 ENCOUNTER — DOCUMENTATION ONLY (OUTPATIENT)
Dept: SURGICAL ONCOLOGY | Facility: CLINIC | Age: 80
End: 2025-06-12
Payer: MEDICARE

## 2025-06-12 DIAGNOSIS — Z85.820 PERSONAL HISTORY OF MALIGNANT MELANOMA OF SKIN: Primary | ICD-10-CM

## 2025-06-12 NOTE — PROGRESS NOTES
Confirmed w/ Kaykay Arnold RN that patient did miss the last requested RIGHT AXILLARY ultrasound that was supposed to be done in Dec 2024. Awaiting order to be inputted into system and once that is done I will send order over to [facility] for scheduling. cpl

## 2025-06-13 ENCOUNTER — DOCUMENTATION ONLY (OUTPATIENT)
Dept: SURGICAL ONCOLOGY | Facility: CLINIC | Age: 80
End: 2025-06-13
Payer: MEDICARE

## 2025-06-13 NOTE — PROGRESS NOTES
Order for R Axillary Ultrasound faxed to [facility] for scheduling. Reminder to check back next week to see if patient has been scheduled. cpl

## 2025-06-18 ENCOUNTER — DOCUMENTATION ONLY (OUTPATIENT)
Dept: SURGICAL ONCOLOGY | Facility: CLINIC | Age: 80
End: 2025-06-18
Payer: MEDICARE

## 2025-06-18 NOTE — PROGRESS NOTES
Reached out to [facility] to check on scheduling of US and spw Patito there who stated that they reached out and spw someone on  who informed them that the patient was .     Sending message to Sara Morales supervisor to get patient marked in system as . cpl